# Patient Record
Sex: MALE | Race: WHITE | NOT HISPANIC OR LATINO
[De-identification: names, ages, dates, MRNs, and addresses within clinical notes are randomized per-mention and may not be internally consistent; named-entity substitution may affect disease eponyms.]

---

## 2017-01-26 ENCOUNTER — APPOINTMENT (OUTPATIENT)
Dept: INTERNAL MEDICINE | Facility: CLINIC | Age: 82
End: 2017-01-26

## 2017-01-30 LAB
BASOPHILS # BLD AUTO: 0.02 K/UL
BASOPHILS NFR BLD AUTO: 0.4 %
EOSINOPHIL # BLD AUTO: 0.16 K/UL
EOSINOPHIL NFR BLD AUTO: 3 %
HCT VFR BLD CALC: 42.4 %
HGB BLD-MCNC: 13.9 G/DL
IMM GRANULOCYTES NFR BLD AUTO: 0.4 %
LYMPHOCYTES # BLD AUTO: 1.65 K/UL
LYMPHOCYTES NFR BLD AUTO: 31 %
MAN DIFF?: NORMAL
MCHC RBC-ENTMCNC: 30 PG
MCHC RBC-ENTMCNC: 32.8 GM/DL
MCV RBC AUTO: 91.6 FL
MONOCYTES # BLD AUTO: 0.39 K/UL
MONOCYTES NFR BLD AUTO: 7.3 %
NEUTROPHILS # BLD AUTO: 3.09 K/UL
NEUTROPHILS NFR BLD AUTO: 57.9 %
PLATELET # BLD AUTO: 102 K/UL
PSA SERPL-MCNC: 4.65 NG/ML
RBC # BLD: 4.63 M/UL
RBC # FLD: 13.6 %
WBC # FLD AUTO: 5.33 K/UL

## 2017-01-31 ENCOUNTER — APPOINTMENT (OUTPATIENT)
Dept: INTERNAL MEDICINE | Facility: CLINIC | Age: 82
End: 2017-01-31

## 2017-04-10 ENCOUNTER — RX RENEWAL (OUTPATIENT)
Age: 82
End: 2017-04-10

## 2017-05-30 ENCOUNTER — APPOINTMENT (OUTPATIENT)
Dept: INTERNAL MEDICINE | Facility: CLINIC | Age: 82
End: 2017-05-30

## 2017-05-31 ENCOUNTER — APPOINTMENT (OUTPATIENT)
Dept: INTERNAL MEDICINE | Facility: CLINIC | Age: 82
End: 2017-05-31

## 2017-05-31 LAB
ALBUMIN SERPL ELPH-MCNC: 4.1 G/DL
ALP BLD-CCNC: 71 U/L
ALT SERPL-CCNC: 15 U/L
ANION GAP SERPL CALC-SCNC: 15 MMOL/L
AST SERPL-CCNC: 16 U/L
BASOPHILS # BLD AUTO: 0.03 K/UL
BASOPHILS NFR BLD AUTO: 0.6 %
BILIRUB SERPL-MCNC: 0.5 MG/DL
BUN SERPL-MCNC: 24 MG/DL
CALCIUM SERPL-MCNC: 9.8 MG/DL
CHLORIDE SERPL-SCNC: 107 MMOL/L
CHOLEST SERPL-MCNC: 191 MG/DL
CHOLEST/HDLC SERPL: 2.9 RATIO
CO2 SERPL-SCNC: 23 MMOL/L
CREAT SERPL-MCNC: 1.16 MG/DL
EOSINOPHIL # BLD AUTO: 0.18 K/UL
EOSINOPHIL NFR BLD AUTO: 3.6 %
GLUCOSE SERPL-MCNC: 100 MG/DL
HCT VFR BLD CALC: 43 %
HDLC SERPL-MCNC: 65 MG/DL
HGB BLD-MCNC: 14.1 G/DL
IMM GRANULOCYTES NFR BLD AUTO: 0.2 %
LDLC SERPL CALC-MCNC: 109 MG/DL
LYMPHOCYTES # BLD AUTO: 1.51 K/UL
LYMPHOCYTES NFR BLD AUTO: 30.2 %
MAN DIFF?: NORMAL
MCHC RBC-ENTMCNC: 30.7 PG
MCHC RBC-ENTMCNC: 32.8 GM/DL
MCV RBC AUTO: 93.5 FL
MONOCYTES # BLD AUTO: 0.26 K/UL
MONOCYTES NFR BLD AUTO: 5.2 %
NEUTROPHILS # BLD AUTO: 3.01 K/UL
NEUTROPHILS NFR BLD AUTO: 60.2 %
PLATELET # BLD AUTO: 111 K/UL
POTASSIUM SERPL-SCNC: 4.5 MMOL/L
PROT SERPL-MCNC: 6.2 G/DL
PSA SERPL-MCNC: 4.41 NG/ML
RBC # BLD: 4.6 M/UL
RBC # FLD: 14 %
SODIUM SERPL-SCNC: 145 MMOL/L
TRIGL SERPL-MCNC: 84 MG/DL
WBC # FLD AUTO: 5 K/UL

## 2017-07-13 ENCOUNTER — RX RENEWAL (OUTPATIENT)
Age: 82
End: 2017-07-13

## 2017-07-25 ENCOUNTER — RX RENEWAL (OUTPATIENT)
Age: 82
End: 2017-07-25

## 2017-11-20 ENCOUNTER — RX RENEWAL (OUTPATIENT)
Age: 82
End: 2017-11-20

## 2018-01-12 ENCOUNTER — APPOINTMENT (OUTPATIENT)
Dept: INTERNAL MEDICINE | Facility: CLINIC | Age: 83
End: 2018-01-12
Payer: MEDICARE

## 2018-01-12 PROCEDURE — 36415 COLL VENOUS BLD VENIPUNCTURE: CPT

## 2018-01-14 LAB
ALBUMIN SERPL ELPH-MCNC: 3.9 G/DL
ALP BLD-CCNC: 66 U/L
ALT SERPL-CCNC: 14 U/L
ANION GAP SERPL CALC-SCNC: 16 MMOL/L
AST SERPL-CCNC: 18 U/L
BASOPHILS # BLD AUTO: 0.03 K/UL
BASOPHILS NFR BLD AUTO: 0.6 %
BILIRUB SERPL-MCNC: 0.4 MG/DL
BUN SERPL-MCNC: 30 MG/DL
CALCIUM SERPL-MCNC: 9.4 MG/DL
CHLORIDE SERPL-SCNC: 105 MMOL/L
CO2 SERPL-SCNC: 24 MMOL/L
CREAT SERPL-MCNC: 1.1 MG/DL
EOSINOPHIL # BLD AUTO: 0.16 K/UL
EOSINOPHIL NFR BLD AUTO: 3.2 %
GLUCOSE SERPL-MCNC: 92 MG/DL
HCT VFR BLD CALC: 43.9 %
HGB BLD-MCNC: 14.3 G/DL
IMM GRANULOCYTES NFR BLD AUTO: 0 %
LYMPHOCYTES # BLD AUTO: 1.66 K/UL
LYMPHOCYTES NFR BLD AUTO: 32.9 %
MAN DIFF?: NORMAL
MCHC RBC-ENTMCNC: 30.8 PG
MCHC RBC-ENTMCNC: 32.6 GM/DL
MCV RBC AUTO: 94.6 FL
MONOCYTES # BLD AUTO: 0.33 K/UL
MONOCYTES NFR BLD AUTO: 6.5 %
NEUTROPHILS # BLD AUTO: 2.86 K/UL
NEUTROPHILS NFR BLD AUTO: 56.8 %
PLATELET # BLD AUTO: 123 K/UL
POTASSIUM SERPL-SCNC: 4.4 MMOL/L
PROT SERPL-MCNC: 6.6 G/DL
PSA SERPL-MCNC: 4.42 NG/ML
RBC # BLD: 4.64 M/UL
RBC # FLD: 14 %
SODIUM SERPL-SCNC: 145 MMOL/L
WBC # FLD AUTO: 5.04 K/UL

## 2018-01-24 ENCOUNTER — APPOINTMENT (OUTPATIENT)
Dept: INTERNAL MEDICINE | Facility: CLINIC | Age: 83
End: 2018-01-24
Payer: MEDICARE

## 2018-01-24 PROCEDURE — 99213 OFFICE O/P EST LOW 20 MIN: CPT

## 2018-01-29 ENCOUNTER — RX RENEWAL (OUTPATIENT)
Age: 83
End: 2018-01-29

## 2018-02-06 ENCOUNTER — NON-APPOINTMENT (OUTPATIENT)
Age: 83
End: 2018-02-06

## 2018-02-06 ENCOUNTER — APPOINTMENT (OUTPATIENT)
Dept: INTERNAL MEDICINE | Facility: CLINIC | Age: 83
End: 2018-02-06
Payer: MEDICARE

## 2018-02-06 VITALS — BODY MASS INDEX: 21.72 KG/M2 | WEIGHT: 150 LBS | HEIGHT: 69.5 IN
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
## 2018-02-06 DIAGNOSIS — R55 SYNCOPE AND COLLAPSE: ICD-10-CM
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
## 2018-02-06 DIAGNOSIS — M25.551 PAIN IN RIGHT HIP: ICD-10-CM

## 2018-02-06 PROCEDURE — 93000 ELECTROCARDIOGRAM COMPLETE: CPT

## 2018-02-06 PROCEDURE — 36415 COLL VENOUS BLD VENIPUNCTURE: CPT

## 2018-02-06 PROCEDURE — 99214 OFFICE O/P EST MOD 30 MIN: CPT | Mod: 25

## 2018-02-07 LAB
ANION GAP SERPL CALC-SCNC: 10 MMOL/L
BUN SERPL-MCNC: 31 MG/DL
CALCIUM SERPL-MCNC: 10.2 MG/DL
CHLORIDE SERPL-SCNC: 105 MMOL/L
CK SERPL-CCNC: 62 U/L
CO2 SERPL-SCNC: 29 MMOL/L
CREAT SERPL-MCNC: 1.19 MG/DL
GLUCOSE SERPL-MCNC: 121 MG/DL
POTASSIUM SERPL-SCNC: 4.9 MMOL/L
SODIUM SERPL-SCNC: 144 MMOL/L

## 2018-02-08 LAB
BASOPHILS # BLD AUTO: 0.03 K/UL
BASOPHILS NFR BLD AUTO: 0.5 %
EOSINOPHIL # BLD AUTO: 0.12 K/UL
EOSINOPHIL NFR BLD AUTO: 2.1 %
HCT VFR BLD CALC: 44.1 %
HGB BLD-MCNC: 13.9 G/DL
IMM GRANULOCYTES NFR BLD AUTO: 0.2 %
LYMPHOCYTES # BLD AUTO: 1.4 K/UL
LYMPHOCYTES NFR BLD AUTO: 24.4 %
MAN DIFF?: NORMAL
MCHC RBC-ENTMCNC: 29.8 PG
MCHC RBC-ENTMCNC: 31.5 GM/DL
MCV RBC AUTO: 94.4 FL
MONOCYTES # BLD AUTO: 0.22 K/UL
MONOCYTES NFR BLD AUTO: 3.8 %
NEUTROPHILS # BLD AUTO: 3.95 K/UL
NEUTROPHILS NFR BLD AUTO: 69 %
PLATELET # BLD AUTO: 113 K/UL
RBC # BLD: 4.67 M/UL
RBC # FLD: 13.9 %
WBC # FLD AUTO: 5.73 K/UL

## 2018-04-24 ENCOUNTER — RX RENEWAL (OUTPATIENT)
Age: 83
End: 2018-04-24

## 2018-06-29 ENCOUNTER — APPOINTMENT (OUTPATIENT)
Dept: DERMATOLOGY | Facility: CLINIC | Age: 83
End: 2018-06-29
Payer: MEDICARE

## 2018-06-29 VITALS
SYSTOLIC BLOOD PRESSURE: 126 MMHG | DIASTOLIC BLOOD PRESSURE: 78 MMHG | WEIGHT: 155 LBS | BODY MASS INDEX: 22.19 KG/M2 | HEIGHT: 70 IN

## 2018-06-29 DIAGNOSIS — L91.8 OTHER HYPERTROPHIC DISORDERS OF THE SKIN: ICD-10-CM

## 2018-06-29 DIAGNOSIS — L82.1 OTHER SEBORRHEIC KERATOSIS: ICD-10-CM

## 2018-06-29 PROCEDURE — 99203 OFFICE O/P NEW LOW 30 MIN: CPT

## 2018-07-02 ENCOUNTER — APPOINTMENT (OUTPATIENT)
Dept: INTERNAL MEDICINE | Facility: CLINIC | Age: 83
End: 2018-07-02
Payer: MEDICARE

## 2018-07-02 LAB
APPEARANCE: CLEAR
BASOPHILS # BLD AUTO: 0.02 K/UL
BASOPHILS NFR BLD AUTO: 0.5 %
BILIRUBIN URINE: NEGATIVE
BLOOD URINE: NEGATIVE
COLOR: YELLOW
EOSINOPHIL # BLD AUTO: 0.1 K/UL
EOSINOPHIL NFR BLD AUTO: 2.5 %
GLUCOSE QUALITATIVE U: NEGATIVE MG/DL
HCT VFR BLD CALC: 43.3 %
HGB BLD-MCNC: 14.3 G/DL
IMM GRANULOCYTES NFR BLD AUTO: 0.2 %
KETONES URINE: NEGATIVE
LEUKOCYTE ESTERASE URINE: NEGATIVE
LYMPHOCYTES # BLD AUTO: 1.47 K/UL
LYMPHOCYTES NFR BLD AUTO: 36.5 %
MAN DIFF?: NORMAL
MCHC RBC-ENTMCNC: 30.6 PG
MCHC RBC-ENTMCNC: 33 GM/DL
MCV RBC AUTO: 92.5 FL
MONOCYTES # BLD AUTO: 0.22 K/UL
MONOCYTES NFR BLD AUTO: 5.5 %
NEUTROPHILS # BLD AUTO: 2.21 K/UL
NEUTROPHILS NFR BLD AUTO: 54.8 %
NITRITE URINE: NEGATIVE
PH URINE: 5
PLATELET # BLD AUTO: 108 K/UL
PROTEIN URINE: NEGATIVE MG/DL
RBC # BLD: 4.68 M/UL
RBC # FLD: 13.7 %
SPECIFIC GRAVITY URINE: 1.02
UROBILINOGEN URINE: NEGATIVE MG/DL
WBC # FLD AUTO: 4.03 K/UL

## 2018-07-02 PROCEDURE — 36415 COLL VENOUS BLD VENIPUNCTURE: CPT

## 2018-07-03 LAB
ALBUMIN SERPL ELPH-MCNC: 4.2 G/DL
ALP BLD-CCNC: 74 U/L
ALT SERPL-CCNC: 14 U/L
ANION GAP SERPL CALC-SCNC: 12 MMOL/L
AST SERPL-CCNC: 15 U/L
BILIRUB SERPL-MCNC: 0.7 MG/DL
BUN SERPL-MCNC: 22 MG/DL
CALCIUM SERPL-MCNC: 9.9 MG/DL
CHLORIDE SERPL-SCNC: 107 MMOL/L
CHOLEST SERPL-MCNC: 173 MG/DL
CHOLEST/HDLC SERPL: 2.7 RATIO
CO2 SERPL-SCNC: 26 MMOL/L
CREAT SERPL-MCNC: 1.19 MG/DL
GLUCOSE SERPL-MCNC: 79 MG/DL
HDLC SERPL-MCNC: 64 MG/DL
LDLC SERPL CALC-MCNC: 95 MG/DL
POTASSIUM SERPL-SCNC: 4.7 MMOL/L
PROT SERPL-MCNC: 6.9 G/DL
PSA SERPL-MCNC: 4.05 NG/ML
SODIUM SERPL-SCNC: 145 MMOL/L
TRIGL SERPL-MCNC: 71 MG/DL

## 2018-07-09 ENCOUNTER — APPOINTMENT (OUTPATIENT)
Dept: INTERNAL MEDICINE | Facility: CLINIC | Age: 83
End: 2018-07-09

## 2018-07-20 ENCOUNTER — RX RENEWAL (OUTPATIENT)
Age: 83
End: 2018-07-20

## 2018-09-14 ENCOUNTER — APPOINTMENT (OUTPATIENT)
Dept: INTERNAL MEDICINE | Facility: CLINIC | Age: 83
End: 2018-09-14
Payer: MEDICARE

## 2018-09-14 PROCEDURE — 36415 COLL VENOUS BLD VENIPUNCTURE: CPT

## 2018-09-18 LAB
BASOPHILS # BLD AUTO: 0.02 K/UL
BASOPHILS NFR BLD AUTO: 0.4 %
EOSINOPHIL # BLD AUTO: 0.19 K/UL
EOSINOPHIL NFR BLD AUTO: 4 %
HCT VFR BLD CALC: 42.5 %
HGB BLD-MCNC: 14 G/DL
IMM GRANULOCYTES NFR BLD AUTO: 0.2 %
LYMPHOCYTES # BLD AUTO: 1.62 K/UL
LYMPHOCYTES NFR BLD AUTO: 34.1 %
MAN DIFF?: NORMAL
MCHC RBC-ENTMCNC: 31 PG
MCHC RBC-ENTMCNC: 32.9 GM/DL
MCV RBC AUTO: 94 FL
MONOCYTES # BLD AUTO: 0.31 K/UL
MONOCYTES NFR BLD AUTO: 6.5 %
NEUTROPHILS # BLD AUTO: 2.6 K/UL
NEUTROPHILS NFR BLD AUTO: 54.8 %
PLATELET # BLD AUTO: 102 K/UL
PSA SERPL-MCNC: 4.02 NG/ML
RBC # BLD: 4.52 M/UL
RBC # FLD: 13.5 %
WBC # FLD AUTO: 4.75 K/UL

## 2018-09-20 ENCOUNTER — APPOINTMENT (OUTPATIENT)
Dept: INTERNAL MEDICINE | Facility: CLINIC | Age: 83
End: 2018-09-20
Payer: MEDICARE

## 2018-09-20 VITALS — HEIGHT: 70 IN | WEIGHT: 155 LBS | BODY MASS INDEX: 22.19 KG/M2

## 2018-09-20 PROCEDURE — 99213 OFFICE O/P EST LOW 20 MIN: CPT

## 2018-09-20 NOTE — HISTORY OF PRESENT ILLNESS
[FreeTextEntry1] : 87 yo male presents for f/u of thrombocytopenia and hypertension. [de-identified] : 87 yo male with known hypertension, thrombocytopenia, and under lots of personal stress presents for evaluation and mgmt.\par He denies symptoms.  Actually reversed his BP medications because he was experimenting with impotence.  No known changes.\par He denies bleeding or spontaneous bruising.

## 2018-10-19 ENCOUNTER — RX RENEWAL (OUTPATIENT)
Age: 83
End: 2018-10-19

## 2018-10-31 ENCOUNTER — APPOINTMENT (OUTPATIENT)
Dept: INTERNAL MEDICINE | Facility: CLINIC | Age: 83
End: 2018-10-31
Payer: MEDICARE

## 2018-10-31 PROCEDURE — 90686 IIV4 VACC NO PRSV 0.5 ML IM: CPT

## 2018-10-31 PROCEDURE — G0008: CPT

## 2019-01-27 ENCOUNTER — RX RENEWAL (OUTPATIENT)
Age: 84
End: 2019-01-27

## 2019-03-01 ENCOUNTER — RX RENEWAL (OUTPATIENT)
Age: 84
End: 2019-03-01

## 2019-05-23 ENCOUNTER — APPOINTMENT (OUTPATIENT)
Dept: INTERNAL MEDICINE | Facility: CLINIC | Age: 84
End: 2019-05-23
Payer: MEDICARE

## 2019-05-23 VITALS
WEIGHT: 160 LBS | BODY MASS INDEX: 22.9 KG/M2 | SYSTOLIC BLOOD PRESSURE: 158 MMHG | HEART RATE: 60 BPM | DIASTOLIC BLOOD PRESSURE: 76 MMHG | HEIGHT: 70 IN | RESPIRATION RATE: 15 BRPM

## 2019-05-23 DIAGNOSIS — K59.00 CONSTIPATION, UNSPECIFIED: ICD-10-CM

## 2019-05-23 PROCEDURE — 99214 OFFICE O/P EST MOD 30 MIN: CPT

## 2019-05-23 NOTE — PHYSICAL EXAM
[Well Nourished] : well nourished [Well Developed] : well developed [Normal Sclera/Conjunctiva] : normal sclera/conjunctiva [No JVD] : no jugular venous distention [Clear to Auscultation] : lungs were clear to auscultation bilaterally [Normal Rate] : normal rate  [Regular Rhythm] : with a regular rhythm [No Edema] : there was no peripheral edema [Soft] : abdomen soft [Non Tender] : non-tender [No CVA Tenderness] : no CVA  tenderness [No Spinal Tenderness] : no spinal tenderness [No Rash] : no rash [de-identified] : Overtly sad individual [de-identified] : Decreased BS

## 2019-05-23 NOTE — HISTORY OF PRESENT ILLNESS
[FreeTextEntry1] : 88 yo patient that drops in for an interval visit and is concerned about thin incomplete BMs [de-identified] : 88 yo male under lots of emotional stress-presents with change in stool habits.  He has noted that they are thin and incomplete.  Although not constipated-he is ruminating about the cause and his loss of the ability to care for Delfina\par He denies abdominal pain, bloating, or weight loss.  He does have hemorrhoids which result in BRBPR butbleeding has not changed.

## 2019-06-10 ENCOUNTER — MEDICATION RENEWAL (OUTPATIENT)
Age: 84
End: 2019-06-10

## 2019-06-27 ENCOUNTER — NON-APPOINTMENT (OUTPATIENT)
Age: 84
End: 2019-06-27

## 2019-06-27 ENCOUNTER — APPOINTMENT (OUTPATIENT)
Dept: INTERNAL MEDICINE | Facility: CLINIC | Age: 84
End: 2019-06-27
Payer: MEDICARE

## 2019-06-27 ENCOUNTER — RX RENEWAL (OUTPATIENT)
Age: 84
End: 2019-06-27

## 2019-06-27 VITALS
WEIGHT: 155 LBS | RESPIRATION RATE: 16 BRPM | DIASTOLIC BLOOD PRESSURE: 80 MMHG | BODY MASS INDEX: 22.19 KG/M2 | SYSTOLIC BLOOD PRESSURE: 180 MMHG | HEIGHT: 70 IN | HEART RATE: 54 BPM

## 2019-06-27 DIAGNOSIS — B35.1 TINEA UNGUIUM: ICD-10-CM

## 2019-06-27 PROCEDURE — G0439: CPT

## 2019-06-27 PROCEDURE — 93000 ELECTROCARDIOGRAM COMPLETE: CPT | Mod: 59

## 2019-06-27 PROCEDURE — 99214 OFFICE O/P EST MOD 30 MIN: CPT | Mod: 25

## 2019-06-27 NOTE — REVIEW OF SYSTEMS
[Dizziness] : dizziness [Fatigue] : fatigue [Unsteady Walk] : ataxia [Depression] : depression [Negative] : Heme/Lymph

## 2019-06-27 NOTE — HEALTH RISK ASSESSMENT
[Good] : ~his/her~ current health as good [Fair] :  ~his/her~ mood as fair [Yes] : Yes [Monthly or less (1 pt)] : Monthly or less (1 point) [1 or 2 (0 pts)] : 1 or 2 (0 points) [No falls in past year] : Patient reported no falls in the past year [1] : 2) Feeling down, depressed, or hopeless for several days (1) [Hepatitis C test declined] : Hepatitis C test declined [None] : None [With Significant Other] : lives with significant other [# of Members in Household ___] :  household currently consist of [unfilled] member(s) [Retired] : retired [] :  [College] : College [Feels Safe at Home] : Feels safe at home [Fully functional (bathing, dressing, toileting, transferring, walking, feeding)] : Fully functional (bathing, dressing, toileting, transferring, walking, feeding) [Fully functional (using the telephone, shopping, preparing meals, housekeeping, doing laundry, using] : Fully functional and needs no help or supervision to perform IADLs (using the telephone, shopping, preparing meals, housekeeping, doing laundry, using transportation, managing medications and managing finances) [Reports changes in dental health] : Reports changes in dental health [Smoke Detector] : smoke detector [Carbon Monoxide Detector] : carbon monoxide detector [Safety elements used in home] : safety elements used in home [Seat Belt] :  uses seat belt [Sunscreen] : uses sunscreen [Designated Healthcare Proxy] : Designated healthcare proxy [Name: ___] : Health Care Proxy's Name: [unfilled]  [Relationship: ___] : Relationship: [unfilled] [FreeTextEntry1] : Signs of aging  [] : No [de-identified] : NO  [de-identified] : NO Salt added, Walking [[id number]] : 2 [Change in mental status noted] : No change in mental status noted [Behavior] : denies difficulty with behavior [Language] : denies difficulty with language [Learning/Retaining New Information] : denies difficulty learning/retaining new information [Handling Complex Tasks] : denies difficulty handling complex tasks [Reasoning] : denies difficulty with reasoning [Spatial Ability and Orientation] : denies difficulty with spatial ability and orientation [Sexually Active] : not sexually active [High Risk Behavior] : no high risk behavior [Reports changes in hearing] : Reports no changes in hearing [Reports changes in vision] : Reports no changes in vision [ColonoscopyComments] : Virtual colonoscopy [de-identified] : Reading a newspaper is difficult;  hearing difficulty [de-identified] :  [de-identified] : Misses work [AdvancecareDate] : 6/27/2019 [FreeTextEntry4] : [id number]  Ronda

## 2019-06-27 NOTE — HISTORY OF PRESENT ILLNESS
[FreeTextEntry1] : 88 yo male presents for a comprehensive  and preventive exam.  He complains a fungus on his right thumb nail and being a little more our of balance at times. 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  He has gotten more help with his wife and more financial support.  It is now a workable situation.  She has not improved and is suffering with chronic UTIs.\par He notes that he has changed his medication to a schedule where he takes both BP meds at night and his Beta Blocker in the morning.\par He also has noted some constipation but feels that it has improved since adding more water and fiber to his regimen.

## 2019-06-27 NOTE — PHYSICAL EXAM
[No Acute Distress] : no acute distress [Well Nourished] : well nourished [Well Developed] : well developed [Normal Sclera/Conjunctiva] : normal sclera/conjunctiva [PERRL] : pupils equal round and reactive to light [Well-Appearing] : well-appearing [EOMI] : extraocular movements intact [Normal Outer Ear/Nose] : the outer ears and nose were normal in appearance [Normal Oropharynx] : the oropharynx was normal [No JVD] : no jugular venous distention [No Lymphadenopathy] : no lymphadenopathy [Supple] : supple [Thyroid Normal, No Nodules] : the thyroid was normal and there were no nodules present [Clear to Auscultation] : lungs were clear to auscultation bilaterally [No Respiratory Distress] : no respiratory distress  [Normal Rate] : normal rate  [No Accessory Muscle Use] : no accessory muscle use [Normal S1, S2] : normal S1 and S2 [Regular Rhythm] : with a regular rhythm [No Carotid Bruits] : no carotid bruits [No Abdominal Bruit] : a ~M bruit was not heard ~T in the abdomen [No Edema] : there was no peripheral edema [Pedal Pulses Present] : the pedal pulses are present [No Extremity Clubbing/Cyanosis] : no extremity clubbing/cyanosis [No Palpable Aorta] : no palpable aorta [Soft] : abdomen soft [Non-distended] : non-distended [Non Tender] : non-tender [Normal Bowel Sounds] : normal bowel sounds [No HSM] : no HSM [No Masses] : no abdominal mass palpated [Normal Posterior Cervical Nodes] : no posterior cervical lymphadenopathy [No Spinal Tenderness] : no spinal tenderness [No CVA Tenderness] : no CVA  tenderness [Normal Anterior Cervical Nodes] : no anterior cervical lymphadenopathy [Grossly Normal Strength/Tone] : grossly normal strength/tone [No Joint Swelling] : no joint swelling [No Rash] : no rash [Normal Gait] : normal gait [No Focal Deficits] : no focal deficits [Coordination Grossly Intact] : coordination grossly intact [Deep Tendon Reflexes (DTR)] : deep tendon reflexes were 2+ and symmetric [Normal Affect] : the affect was normal [de-identified] : Cerumenous canals bilaterally [Normal Insight/Judgement] : insight and judgment were intact [de-identified] : Slow moving individual/ but no significant change from the past [de-identified] : 1/6 murmur as previously at LLSB to apex c.w MR [de-identified] : Vennous stasis  and some varicosities [de-identified] : Bilateral incomplete hernias inguinal [de-identified] : Thumb or Right hand has a paronychial fungal infection [de-identified] : Slow [de-identified] : Multiple Seborrheic Keratosis [de-identified] : Bilateral scrotal cyst c/w hydrocele, Prostat 3+ enlarged without nodules

## 2019-06-28 ENCOUNTER — MED ADMIN CHARGE (OUTPATIENT)
Age: 84
End: 2019-06-28

## 2019-06-28 ENCOUNTER — APPOINTMENT (OUTPATIENT)
Dept: INTERNAL MEDICINE | Facility: CLINIC | Age: 84
End: 2019-06-28
Payer: MEDICARE

## 2019-06-28 DIAGNOSIS — Z23 ENCOUNTER FOR IMMUNIZATION: ICD-10-CM

## 2019-06-28 PROCEDURE — G0009: CPT

## 2019-06-28 PROCEDURE — 90670 PCV13 VACCINE IM: CPT

## 2019-07-01 LAB
25(OH)D3 SERPL-MCNC: 23.3 NG/ML
ALBUMIN SERPL ELPH-MCNC: 4.4 G/DL
ALP BLD-CCNC: 81 U/L
ALT SERPL-CCNC: 16 U/L
ANION GAP SERPL CALC-SCNC: 10 MMOL/L
APPEARANCE: CLEAR
AST SERPL-CCNC: 14 U/L
BACTERIA: NEGATIVE
BASOPHILS # BLD AUTO: 0.04 K/UL
BASOPHILS NFR BLD AUTO: 0.8 %
BILIRUB SERPL-MCNC: 0.5 MG/DL
BILIRUBIN URINE: NEGATIVE
BLOOD URINE: NEGATIVE
BUN SERPL-MCNC: 23 MG/DL
CALCIUM SERPL-MCNC: 9.7 MG/DL
CHLORIDE SERPL-SCNC: 107 MMOL/L
CHOLEST SERPL-MCNC: 181 MG/DL
CHOLEST/HDLC SERPL: 2.6 RATIO
CO2 SERPL-SCNC: 26 MMOL/L
COLOR: NORMAL
CREAT SERPL-MCNC: 1.16 MG/DL
EOSINOPHIL # BLD AUTO: 0.14 K/UL
EOSINOPHIL NFR BLD AUTO: 2.8 %
GLUCOSE QUALITATIVE U: NEGATIVE
GLUCOSE SERPL-MCNC: 100 MG/DL
HCT VFR BLD CALC: 42.4 %
HDLC SERPL-MCNC: 71 MG/DL
HGB BLD-MCNC: 14.1 G/DL
HYALINE CASTS: 1 /LPF
IMM GRANULOCYTES NFR BLD AUTO: 0.4 %
KETONES URINE: NEGATIVE
LDLC SERPL CALC-MCNC: 99 MG/DL
LEUKOCYTE ESTERASE URINE: NEGATIVE
LYMPHOCYTES # BLD AUTO: 1.44 K/UL
LYMPHOCYTES NFR BLD AUTO: 28.8 %
MAN DIFF?: NORMAL
MCHC RBC-ENTMCNC: 30.7 PG
MCHC RBC-ENTMCNC: 33.3 GM/DL
MCV RBC AUTO: 92.4 FL
MICROSCOPIC-UA: NORMAL
MONOCYTES # BLD AUTO: 0.36 K/UL
MONOCYTES NFR BLD AUTO: 7.2 %
NEUTROPHILS # BLD AUTO: 3 K/UL
NEUTROPHILS NFR BLD AUTO: 60 %
NITRITE URINE: NEGATIVE
PH URINE: 5.5
PLATELET # BLD AUTO: 105 K/UL
POTASSIUM SERPL-SCNC: 4.6 MMOL/L
PROT SERPL-MCNC: 6.4 G/DL
PROTEIN URINE: NORMAL
PSA SERPL-MCNC: 9.48 NG/ML
RBC # BLD: 4.59 M/UL
RBC # FLD: 13.2 %
RED BLOOD CELLS URINE: 0 /HPF
SODIUM SERPL-SCNC: 143 MMOL/L
SPECIFIC GRAVITY URINE: 1.02
SQUAMOUS EPITHELIAL CELLS: 1 /HPF
T4 SERPL-MCNC: 6.7 UG/DL
TRIGL SERPL-MCNC: 54 MG/DL
TSH SERPL-ACNC: 2.8 UIU/ML
UROBILINOGEN URINE: NORMAL
WBC # FLD AUTO: 5 K/UL
WHITE BLOOD CELLS URINE: 1 /HPF

## 2019-09-18 ENCOUNTER — RX RENEWAL (OUTPATIENT)
Age: 84
End: 2019-09-18

## 2019-11-22 ENCOUNTER — MEDICATION RENEWAL (OUTPATIENT)
Age: 84
End: 2019-11-22

## 2019-11-25 ENCOUNTER — RX RENEWAL (OUTPATIENT)
Age: 84
End: 2019-11-25

## 2020-01-06 ENCOUNTER — APPOINTMENT (OUTPATIENT)
Dept: INTERNAL MEDICINE | Facility: CLINIC | Age: 85
End: 2020-01-06
Payer: MEDICARE

## 2020-01-06 VITALS
DIASTOLIC BLOOD PRESSURE: 72 MMHG | HEIGHT: 70 IN | BODY MASS INDEX: 24.05 KG/M2 | WEIGHT: 168 LBS | SYSTOLIC BLOOD PRESSURE: 140 MMHG | HEART RATE: 54 BPM

## 2020-01-06 PROCEDURE — 90686 IIV4 VACC NO PRSV 0.5 ML IM: CPT

## 2020-01-06 PROCEDURE — 99214 OFFICE O/P EST MOD 30 MIN: CPT | Mod: 25

## 2020-01-06 PROCEDURE — G0008: CPT

## 2020-01-06 NOTE — PHYSICAL EXAM
[No Acute Distress] : no acute distress [Normal Oropharynx] : the oropharynx was normal [Clear to Auscultation] : lungs were clear to auscultation bilaterally [Normal Percussion] : the chest was normal to percussion [Normal Rate] : normal rate  [Regular Rhythm] : with a regular rhythm [Pedal Pulses Present] : the pedal pulses are present [Soft] : abdomen soft [Non Tender] : non-tender [Normal] : no posterior cervical lymphadenopathy and no anterior cervical lymphadenopathy [No CVA Tenderness] : no CVA  tenderness [Normal Gait] : normal gait [Deep Tendon Reflexes (DTR)] : deep tendon reflexes were 2+ and symmetric [de-identified] : 1/6 [de-identified] : Trace/edema [de-identified] : Petechia in the feet

## 2020-01-06 NOTE — HISTORY OF PRESENT ILLNESS
[FreeTextEntry1] : 86 yo male presents for an interval exam after being -his wife was severely disabled prior to passing. [de-identified] : 88 yo male presents and notes that he tires and develops cramps in his legs when walking up hills.  He denies chest pressure.\par Life is OK after losing his wife.  He has several woman who he sees.  He has gained about 10-15 lbs.\par He has not had the echocardiogram

## 2020-01-27 ENCOUNTER — RX RENEWAL (OUTPATIENT)
Age: 85
End: 2020-01-27

## 2020-02-12 ENCOUNTER — APPOINTMENT (OUTPATIENT)
Dept: CV DIAGNOSITCS | Facility: HOSPITAL | Age: 85
End: 2020-02-12

## 2020-02-12 ENCOUNTER — OUTPATIENT (OUTPATIENT)
Dept: OUTPATIENT SERVICES | Facility: HOSPITAL | Age: 85
LOS: 1 days | End: 2020-02-12
Payer: MEDICARE

## 2020-02-12 DIAGNOSIS — R01.1 CARDIAC MURMUR, UNSPECIFIED: ICD-10-CM

## 2020-02-12 PROCEDURE — 93306 TTE W/DOPPLER COMPLETE: CPT

## 2020-02-12 PROCEDURE — 93306 TTE W/DOPPLER COMPLETE: CPT | Mod: 26

## 2020-02-15 LAB
ALBUMIN SERPL ELPH-MCNC: 4.4 G/DL
ALP BLD-CCNC: 73 U/L
ALT SERPL-CCNC: 14 U/L
ANION GAP SERPL CALC-SCNC: 13 MMOL/L
APPEARANCE: CLEAR
AST SERPL-CCNC: 16 U/L
BASOPHILS # BLD AUTO: 0.03 K/UL
BASOPHILS NFR BLD AUTO: 0.6 %
BILIRUB SERPL-MCNC: 0.6 MG/DL
BILIRUBIN URINE: NEGATIVE
BLOOD URINE: NEGATIVE
BUN SERPL-MCNC: 20 MG/DL
CALCIUM SERPL-MCNC: 9.7 MG/DL
CHLORIDE SERPL-SCNC: 107 MMOL/L
CHOLEST SERPL-MCNC: 188 MG/DL
CHOLEST/HDLC SERPL: 2.5 RATIO
CO2 SERPL-SCNC: 25 MMOL/L
COLOR: NORMAL
CREAT SERPL-MCNC: 1.2 MG/DL
EOSINOPHIL # BLD AUTO: 0.17 K/UL
EOSINOPHIL NFR BLD AUTO: 3.2 %
GLUCOSE QUALITATIVE U: NEGATIVE
GLUCOSE SERPL-MCNC: 95 MG/DL
HCT VFR BLD CALC: 44.6 %
HDLC SERPL-MCNC: 74 MG/DL
HGB BLD-MCNC: 14.5 G/DL
IMM GRANULOCYTES NFR BLD AUTO: 0.4 %
KETONES URINE: NEGATIVE
LDLC SERPL CALC-MCNC: 98 MG/DL
LEUKOCYTE ESTERASE URINE: NEGATIVE
LYMPHOCYTES # BLD AUTO: 1.35 K/UL
LYMPHOCYTES NFR BLD AUTO: 25.5 %
MAN DIFF?: NORMAL
MCHC RBC-ENTMCNC: 30.9 PG
MCHC RBC-ENTMCNC: 32.5 GM/DL
MCV RBC AUTO: 94.9 FL
MONOCYTES # BLD AUTO: 0.37 K/UL
MONOCYTES NFR BLD AUTO: 7 %
NEUTROPHILS # BLD AUTO: 3.35 K/UL
NEUTROPHILS NFR BLD AUTO: 63.3 %
NITRITE URINE: NEGATIVE
PH URINE: 5.5
PLATELET # BLD AUTO: 108 K/UL
POTASSIUM SERPL-SCNC: 4.5 MMOL/L
PROT SERPL-MCNC: 6.4 G/DL
PROTEIN URINE: NORMAL
PSA FREE FLD-MCNC: 42 %
PSA FREE SERPL-MCNC: 1.83 NG/ML
PSA SERPL-MCNC: 4.35 NG/ML
RBC # BLD: 4.7 M/UL
RBC # FLD: 13.4 %
SODIUM SERPL-SCNC: 145 MMOL/L
SPECIFIC GRAVITY URINE: 1.01
TRIGL SERPL-MCNC: 78 MG/DL
UROBILINOGEN URINE: NORMAL
WBC # FLD AUTO: 5.29 K/UL

## 2020-02-20 ENCOUNTER — APPOINTMENT (OUTPATIENT)
Dept: INTERNAL MEDICINE | Facility: CLINIC | Age: 85
End: 2020-02-20
Payer: MEDICARE

## 2020-02-20 VITALS
DIASTOLIC BLOOD PRESSURE: 74 MMHG | HEIGHT: 69 IN | SYSTOLIC BLOOD PRESSURE: 124 MMHG | BODY MASS INDEX: 24.88 KG/M2 | WEIGHT: 168 LBS

## 2020-02-20 PROCEDURE — 99214 OFFICE O/P EST MOD 30 MIN: CPT

## 2020-02-20 NOTE — HISTORY OF PRESENT ILLNESS
[FreeTextEntry1] : 86 yo male presents for a f/u after having an echocardiogram for evaluation of his BP control.a [de-identified] : 88 yo male with hypertension and grieving presents for a f/u after having an echocardiogram which demonstrated normal wall dimensions without hypertrophy and a calcified mitral valve with some mitral insufficiency.\par He is feeling better.

## 2020-02-20 NOTE — PHYSICAL EXAM
[No Acute Distress] : no acute distress [Well Developed] : well developed [Well-Appearing] : well-appearing [No JVD] : no jugular venous distention [Clear to Auscultation] : lungs were clear to auscultation bilaterally [No Edema] : there was no peripheral edema [de-identified] : 1/6 murmur

## 2020-05-18 ENCOUNTER — RX RENEWAL (OUTPATIENT)
Age: 85
End: 2020-05-18

## 2020-05-28 ENCOUNTER — APPOINTMENT (OUTPATIENT)
Dept: INTERNAL MEDICINE | Facility: CLINIC | Age: 85
End: 2020-05-28
Payer: MEDICARE

## 2020-05-28 VITALS — HEIGHT: 69 IN | BODY MASS INDEX: 23.4 KG/M2 | WEIGHT: 158 LBS

## 2020-05-28 DIAGNOSIS — I34.0 NONRHEUMATIC MITRAL (VALVE) INSUFFICIENCY: ICD-10-CM

## 2020-05-28 PROCEDURE — 99213 OFFICE O/P EST LOW 20 MIN: CPT

## 2020-05-28 NOTE — HISTORY OF PRESENT ILLNESS
[FreeTextEntry1] : 89 yo male presents for an interval exam. He notes that he has made a mistake with his medications and is swollen. [de-identified] : 89 yo male with hypertension and some Mitral insufficiency presents for f/u.  He is still grieving from his loss of his wife but has been getting good care from multiple women who are friends and care givers.\par He has no significant complaints.\par He realized this morning that he is taking two Amlodipine instead of Amlodipine and lisinopril.

## 2020-05-28 NOTE — PHYSICAL EXAM
[No Acute Distress] : no acute distress [Well Developed] : well developed [Well Nourished] : well nourished [Well-Appearing] : well-appearing [Normal Sclera/Conjunctiva] : normal sclera/conjunctiva [PERRL] : pupils equal round and reactive to light [EOMI] : extraocular movements intact [Normal Outer Ear/Nose] : the outer ears and nose were normal in appearance [No JVD] : no jugular venous distention [Normal Oropharynx] : the oropharynx was normal [No Lymphadenopathy] : no lymphadenopathy [Supple] : supple [Thyroid Normal, No Nodules] : the thyroid was normal and there were no nodules present [No Accessory Muscle Use] : no accessory muscle use [No Respiratory Distress] : no respiratory distress  [Normal Rate] : normal rate  [Regular Rhythm] : with a regular rhythm [Clear to Auscultation] : lungs were clear to auscultation bilaterally [Normal S1, S2] : normal S1 and S2 [No Varicosities] : no varicosities [No Abdominal Bruit] : a ~M bruit was not heard ~T in the abdomen [No Carotid Bruits] : no carotid bruits [Pedal Pulses Present] : the pedal pulses are present [No Extremity Clubbing/Cyanosis] : no extremity clubbing/cyanosis [Soft] : abdomen soft [No Palpable Aorta] : no palpable aorta [Non-distended] : non-distended [Non Tender] : non-tender [No HSM] : no HSM [Normal Bowel Sounds] : normal bowel sounds [No Masses] : no abdominal mass palpated [Normal Posterior Cervical Nodes] : no posterior cervical lymphadenopathy [No CVA Tenderness] : no CVA  tenderness [Normal Anterior Cervical Nodes] : no anterior cervical lymphadenopathy [No Spinal Tenderness] : no spinal tenderness [Grossly Normal Strength/Tone] : grossly normal strength/tone [No Joint Swelling] : no joint swelling [Coordination Grossly Intact] : coordination grossly intact [No Rash] : no rash [Normal Gait] : normal gait [No Focal Deficits] : no focal deficits [Normal Affect] : the affect was normal [Deep Tendon Reflexes (DTR)] : deep tendon reflexes were 2+ and symmetric [Normal Insight/Judgement] : insight and judgment were intact [de-identified] : He has gained some weight [de-identified] : 1+ edema bilat [de-identified] : 2/6 murmur to apex

## 2020-07-13 ENCOUNTER — RX RENEWAL (OUTPATIENT)
Age: 85
End: 2020-07-13

## 2020-09-21 ENCOUNTER — APPOINTMENT (OUTPATIENT)
Dept: INTERNAL MEDICINE | Facility: CLINIC | Age: 85
End: 2020-09-21

## 2020-09-24 ENCOUNTER — APPOINTMENT (OUTPATIENT)
Dept: INTERNAL MEDICINE | Facility: CLINIC | Age: 85
End: 2020-09-24
Payer: MEDICARE

## 2020-09-24 ENCOUNTER — MED ADMIN CHARGE (OUTPATIENT)
Age: 85
End: 2020-09-24

## 2020-09-24 DIAGNOSIS — Z23 ENCOUNTER FOR IMMUNIZATION: ICD-10-CM

## 2020-09-24 PROCEDURE — 36415 COLL VENOUS BLD VENIPUNCTURE: CPT

## 2020-09-24 PROCEDURE — 99214 OFFICE O/P EST MOD 30 MIN: CPT | Mod: 25

## 2020-09-24 PROCEDURE — 90662 IIV NO PRSV INCREASED AG IM: CPT

## 2020-09-24 PROCEDURE — G0008: CPT

## 2020-09-24 RX ORDER — AMLODIPINE BESYLATE 2.5 MG/1
2.5 TABLET ORAL
Qty: 90 | Refills: 0 | Status: DISCONTINUED | COMMUNITY
Start: 2020-05-18 | End: 2020-09-24

## 2020-09-24 NOTE — HISTORY OF PRESENT ILLNESS
[FreeTextEntry1] : 89 yo man presents for a F/U interval visit.  HE is doing well with no specific problems. [de-identified] : 87 yo male with hypertension and a recent  presents for a follow up.  He notes that he now has a   and that relationship is good.\par He notes some glands on his tongue which he discusses with his dentist.

## 2020-09-24 NOTE — PHYSICAL EXAM
[No Acute Distress] : no acute distress [Well Developed] : well developed [Normal Oropharynx] : the oropharynx was normal [No Respiratory Distress] : no respiratory distress  [Clear to Auscultation] : lungs were clear to auscultation bilaterally [Normal Rate] : normal rate  [Soft] : abdomen soft [Non Tender] : non-tender [No CVA Tenderness] : no CVA  tenderness [No Spinal Tenderness] : no spinal tenderness [de-identified] : Minimal edema

## 2020-09-30 LAB
ANION GAP SERPL CALC-SCNC: 12 MMOL/L
BASOPHILS # BLD AUTO: 0.04 K/UL
BASOPHILS NFR BLD AUTO: 0.8 %
BUN SERPL-MCNC: 26 MG/DL
CALCIUM SERPL-MCNC: 10.1 MG/DL
CHLORIDE SERPL-SCNC: 105 MMOL/L
CO2 SERPL-SCNC: 28 MMOL/L
CREAT SERPL-MCNC: 1.05 MG/DL
EOSINOPHIL # BLD AUTO: 0.15 K/UL
EOSINOPHIL NFR BLD AUTO: 3.2 %
GLUCOSE SERPL-MCNC: 103 MG/DL
HCT VFR BLD CALC: 46.3 %
HGB BLD-MCNC: 14.8 G/DL
IMM GRANULOCYTES NFR BLD AUTO: 0.2 %
LYMPHOCYTES # BLD AUTO: 1.47 K/UL
LYMPHOCYTES NFR BLD AUTO: 30.9 %
MAN DIFF?: NORMAL
MCHC RBC-ENTMCNC: 30.5 PG
MCHC RBC-ENTMCNC: 32 GM/DL
MCV RBC AUTO: 95.3 FL
MONOCYTES # BLD AUTO: 0.29 K/UL
MONOCYTES NFR BLD AUTO: 6.1 %
NEUTROPHILS # BLD AUTO: 2.79 K/UL
NEUTROPHILS NFR BLD AUTO: 58.8 %
PLATELET # BLD AUTO: 115 K/UL
POTASSIUM SERPL-SCNC: 4.6 MMOL/L
PSA SERPL-MCNC: 5.22 NG/ML
RBC # BLD: 4.86 M/UL
RBC # FLD: 13.5 %
SODIUM SERPL-SCNC: 144 MMOL/L
WBC # FLD AUTO: 4.75 K/UL

## 2020-12-07 ENCOUNTER — RX RENEWAL (OUTPATIENT)
Age: 85
End: 2020-12-07

## 2020-12-09 ENCOUNTER — APPOINTMENT (OUTPATIENT)
Dept: INTERNAL MEDICINE | Facility: CLINIC | Age: 85
End: 2020-12-09
Payer: MEDICARE

## 2020-12-09 PROCEDURE — 36415 COLL VENOUS BLD VENIPUNCTURE: CPT

## 2020-12-14 LAB
BASOPHILS # BLD AUTO: 0.04 K/UL
BASOPHILS NFR BLD AUTO: 0.8 %
EOSINOPHIL # BLD AUTO: 0.2 K/UL
EOSINOPHIL NFR BLD AUTO: 4 %
ESTIMATED AVERAGE GLUCOSE: 105 MG/DL
HBA1C MFR BLD HPLC: 5.3 %
HCT VFR BLD CALC: 43.1 %
HGB BLD-MCNC: 13.9 G/DL
IMM GRANULOCYTES NFR BLD AUTO: 0.4 %
LYMPHOCYTES # BLD AUTO: 1.59 K/UL
LYMPHOCYTES NFR BLD AUTO: 31.9 %
MAN DIFF?: NORMAL
MCHC RBC-ENTMCNC: 30.3 PG
MCHC RBC-ENTMCNC: 32.3 GM/DL
MCV RBC AUTO: 93.9 FL
MONOCYTES # BLD AUTO: 0.36 K/UL
MONOCYTES NFR BLD AUTO: 7.2 %
NEUTROPHILS # BLD AUTO: 2.77 K/UL
NEUTROPHILS NFR BLD AUTO: 55.7 %
PLATELET # BLD AUTO: 96 K/UL
PSA SERPL-MCNC: 4.93 NG/ML
RBC # BLD: 4.59 M/UL
RBC # FLD: 13.6 %
SARS-COV-2 IGG SERPL IA-ACNC: 0.09 INDEX
SARS-COV-2 IGG SERPL QL IA: NEGATIVE
WBC # FLD AUTO: 4.98 K/UL

## 2021-02-07 ENCOUNTER — TRANSCRIPTION ENCOUNTER (OUTPATIENT)
Age: 86
End: 2021-02-07

## 2021-02-16 ENCOUNTER — APPOINTMENT (OUTPATIENT)
Dept: INTERNAL MEDICINE | Facility: CLINIC | Age: 86
End: 2021-02-16
Payer: MEDICARE

## 2021-02-16 DIAGNOSIS — N34.2 OTHER URETHRITIS: ICD-10-CM

## 2021-02-16 PROCEDURE — 99213 OFFICE O/P EST LOW 20 MIN: CPT | Mod: 25

## 2021-02-16 PROCEDURE — 36415 COLL VENOUS BLD VENIPUNCTURE: CPT

## 2021-02-16 NOTE — HISTORY OF PRESENT ILLNESS
[FreeTextEntry8] : 89 yo male presents after getting better from Influenza infection diagnosed last week at [facility] and treated with Tamiflu.  He feels better and had a mild case.  However, about 4 days ago he noted blood on passing his urine that was not mixed with the urine.  He has some dyuria and tenderness at the tip of the penis.\par He is sexually active.

## 2021-02-16 NOTE — PHYSICAL EXAM
[No Acute Distress] : no acute distress [Well-Appearing] : well-appearing [Normal Sclera/Conjunctiva] : normal sclera/conjunctiva [Normal Outer Ear/Nose] : the outer ears and nose were normal in appearance [No JVD] : no jugular venous distention [No Respiratory Distress] : no respiratory distress  [Clear to Auscultation] : lungs were clear to auscultation bilaterally [Normal Rate] : normal rate  [Normal S1, S2] : normal S1 and S2 [Soft] : abdomen soft [No CVA Tenderness] : no CVA  tenderness [de-identified] : No urethral discharge or sore or overt lesion; Hydrocoels as previously

## 2021-02-17 LAB
APPEARANCE: CLEAR
BILIRUBIN URINE: NEGATIVE
BLOOD URINE: NEGATIVE
COLOR: NORMAL
GLUCOSE QUALITATIVE U: NEGATIVE
KETONES URINE: NEGATIVE
LEUKOCYTE ESTERASE URINE: NEGATIVE
NITRITE URINE: NEGATIVE
PH URINE: 6
PLATELET # PLAS AUTO: 123 K/UL
PROTEIN URINE: NEGATIVE
SPECIFIC GRAVITY URINE: 1.01
UROBILINOGEN URINE: NORMAL

## 2021-04-18 ENCOUNTER — RX RENEWAL (OUTPATIENT)
Age: 86
End: 2021-04-18

## 2021-04-26 ENCOUNTER — RX RENEWAL (OUTPATIENT)
Age: 86
End: 2021-04-26

## 2021-08-12 ENCOUNTER — APPOINTMENT (OUTPATIENT)
Dept: INTERNAL MEDICINE | Facility: CLINIC | Age: 86
End: 2021-08-12
Payer: MEDICARE

## 2021-08-12 DIAGNOSIS — L30.9 DERMATITIS, UNSPECIFIED: ICD-10-CM

## 2021-08-12 PROCEDURE — 99213 OFFICE O/P EST LOW 20 MIN: CPT

## 2021-08-12 NOTE — HISTORY OF PRESENT ILLNESS
[FreeTextEntry1] : 88 yo male presents concerned about a rash for an interval visit [de-identified] : 88 yo male with hypertension, thrombocytopenia, and  presents with a chronic rash over the right ankle.  He has been self medicating but it has not resolved.  It is slightly itchy.

## 2021-08-12 NOTE — PHYSICAL EXAM
[No Acute Distress] : no acute distress [Well Developed] : well developed [Normal Sclera/Conjunctiva] : normal sclera/conjunctiva [Clear to Auscultation] : lungs were clear to auscultation bilaterally [Normal Percussion] : the chest was normal to percussion [Normal Rate] : normal rate  [No Edema] : there was no peripheral edema [No CVA Tenderness] : no CVA  tenderness [de-identified] : 2/6 murmur [de-identified] : Dry scaly multiple patches of flaky iriitation that looks excoriated.

## 2021-09-09 ENCOUNTER — APPOINTMENT (OUTPATIENT)
Dept: INTERNAL MEDICINE | Facility: CLINIC | Age: 86
End: 2021-09-09
Payer: MEDICARE

## 2021-09-09 VITALS
WEIGHT: 157 LBS | DIASTOLIC BLOOD PRESSURE: 78 MMHG | BODY MASS INDEX: 23.79 KG/M2 | HEIGHT: 68 IN | SYSTOLIC BLOOD PRESSURE: 156 MMHG

## 2021-09-09 DIAGNOSIS — F32.9 MAJOR DEPRESSIVE DISORDER, SINGLE EPISODE, UNSPECIFIED: ICD-10-CM

## 2021-09-09 PROCEDURE — 99213 OFFICE O/P EST LOW 20 MIN: CPT

## 2021-09-09 NOTE — HISTORY OF PRESENT ILLNESS
[FreeTextEntry1] : 89 yo male presents for f/u of his BP and his rash. [de-identified] : 88 yo male with long standing hypertension presents for f/u.  His rash responded to the Lidex cream with good results and resolved in 2 days.  He is feeling well.

## 2021-09-15 ENCOUNTER — RX RENEWAL (OUTPATIENT)
Age: 86
End: 2021-09-15

## 2021-10-25 ENCOUNTER — RX RENEWAL (OUTPATIENT)
Age: 86
End: 2021-10-25

## 2021-12-16 ENCOUNTER — APPOINTMENT (OUTPATIENT)
Dept: INTERNAL MEDICINE | Facility: CLINIC | Age: 86
End: 2021-12-16
Payer: MEDICARE

## 2021-12-16 DIAGNOSIS — R80.9 PROTEINURIA, UNSPECIFIED: ICD-10-CM

## 2021-12-16 PROCEDURE — 99214 OFFICE O/P EST MOD 30 MIN: CPT

## 2021-12-16 NOTE — PHYSICAL EXAM
[No Acute Distress] : no acute distress [Well Developed] : well developed [No JVD] : no jugular venous distention [Clear to Auscultation] : lungs were clear to auscultation bilaterally [Normal Percussion] : the chest was normal to percussion [Normal Rate] : normal rate  [Regular Rhythm] : with a regular rhythm [No Edema] : there was no peripheral edema [Normal Affect] : the affect was normal [Normal Mood] : the mood was normal [de-identified] : Rash over left arm-scaly maculopapular

## 2021-12-16 NOTE — HISTORY OF PRESENT ILLNESS
[FreeTextEntry1] : 89 yo male presents for an interval visit.  He brings his log of BP readings. [de-identified] : 88 yo male who is still mourning the loss of his wife with labile BP who has monitoring his BP at home.  He presents a log that shows variability.-but many elevated readings. with others in the normal range with the lowest readings in th 120-125 range with normal diastolic.\par He denies symptoms.

## 2022-01-10 ENCOUNTER — TRANSCRIPTION ENCOUNTER (OUTPATIENT)
Age: 87
End: 2022-01-10

## 2022-01-11 ENCOUNTER — NON-APPOINTMENT (OUTPATIENT)
Age: 87
End: 2022-01-11

## 2022-04-18 ENCOUNTER — APPOINTMENT (OUTPATIENT)
Dept: NEPHROLOGY | Facility: CLINIC | Age: 87
End: 2022-04-18
Payer: MEDICARE

## 2022-04-18 VITALS
HEIGHT: 68 IN | OXYGEN SATURATION: 98 % | RESPIRATION RATE: 15 BRPM | HEART RATE: 54 BPM | BODY MASS INDEX: 25.46 KG/M2 | WEIGHT: 168 LBS | DIASTOLIC BLOOD PRESSURE: 64 MMHG | SYSTOLIC BLOOD PRESSURE: 150 MMHG

## 2022-04-18 VITALS
BODY MASS INDEX: 25.46 KG/M2 | HEIGHT: 68 IN | TEMPERATURE: 97.3 F | OXYGEN SATURATION: 98 % | WEIGHT: 168 LBS | DIASTOLIC BLOOD PRESSURE: 79 MMHG | HEART RATE: 52 BPM | SYSTOLIC BLOOD PRESSURE: 163 MMHG

## 2022-04-18 DIAGNOSIS — D69.6 THROMBOCYTOPENIA, UNSPECIFIED: ICD-10-CM

## 2022-04-18 DIAGNOSIS — R01.1 CARDIAC MURMUR, UNSPECIFIED: ICD-10-CM

## 2022-04-18 PROCEDURE — 99203 OFFICE O/P NEW LOW 30 MIN: CPT

## 2022-04-18 NOTE — ASSESSMENT
[FreeTextEntry1] : 1. Hypertension: systolic. Normal to low diastolic. May benefit from increasing the dose of Amlodipine from 5 to 10 mg. However, first will do standard home BP monitoring protocol for 3 consecutive days, AM and PM, with an empty bladder and in the proper position and after appropriate rest. The patient will do this twice at an interval of 2 weeks and then report to me for evaluation. We discuss the option of a 24 ABPM in the future, if needed. \par 2. Thrombocytopenia: will check platelets on a blue top tube. Will also check general CBC and chemistry as the last time was over one year ago. \par 3. BPH. Follow up PSA.\par 4. Heart Murmur. ECHO showed and enlarged left atrium and mitral valve calcification and aortic sclerosis. Patient has no symptoms. The ECHO was done over 2 years ago. May do a follow up ECHO if there is suspicion of aortic stenosis. \par Will call the patient in AM w/ results of today's labs and discuss future follow up after results of home BP monitoring are available.

## 2022-04-18 NOTE — PHYSICAL EXAM
[General Appearance - Alert] : alert [General Appearance - In No Acute Distress] : in no acute distress [Sclera] : the sclera and conjunctiva were normal [Hearing Threshold Finger Rub Not [place]] : hearing was normal [Jugular Venous Distention Increased] : there was no jugular-venous distention [Auscultation Breath Sounds / Voice Sounds] : lungs were clear to auscultation bilaterally [Heart Sounds] : normal S1 and S2 [Heart Sounds Gallop] : no gallops [Systolic grade ___/6] : A grade [unfilled]/6 systolic murmur was heard. [Abdomen Tenderness] : non-tender [] : no hepato-splenomegaly [FreeTextEntry1] : No pulsatile mass [No CVA Tenderness] : no ~M costovertebral angle tenderness [Abnormal Walk] : normal gait [No Focal Deficits] : no focal deficits [Oriented To Time, Place, And Person] : oriented to person, place, and time

## 2022-04-18 NOTE — CONSULT LETTER
[Dear  ___] : Dear  [unfilled], [Consult Letter:] : I had the pleasure of evaluating your patient, [unfilled]. [Please see my note below.] : Please see my note below. [Consult Closing:] : Thank you very much for allowing me to participate in the care of this patient.  If you have any questions, please do not hesitate to contact me. [Sincerely,] : Sincerely, [FreeTextEntry2] : Dr. Susie Bae [FreeTextEntry3] : Marco Cervantes MD

## 2022-04-18 NOTE — REASON FOR VISIT
[Consultation] : a consultation visit [FreeTextEntry1] : 89 yo gentleman seen today to establish ongoing care for hypertension

## 2022-04-18 NOTE — REVIEW OF SYSTEMS
[Constipation] : constipation [Incontinence] : incontinence [Negative] : Musculoskeletal [FreeTextEntry5] : Heart murmur [FreeTextEntry4] : Post nasal drip [de-identified] : Eczema [FreeTextEntry8] : Urge incontinence. Has history of BPH

## 2022-04-18 NOTE — HISTORY OF PRESENT ILLNESS
[FreeTextEntry1] : Ramona reviewed. The patient is an excellent historian.  He was followed by Dr. Rajendra Jeffries for hypertension. He has been monitoring his BP at home w/o a routine schedule.  He is concern that his systolic BP often (around 60% of the time is above 150.  His diastolic is always below 70.  He states that if the gets up quickly he feels lightheaded.  His current medication regimen was reconciled.  He has a positive family history for ruptured aortic aneurysm (father and paternal uncle). His other medical issues include eczema, BPH and sinus congestion. \par The purpose of this visit is to establish ongoing nephrology follow up. \par Of note, the patient contracted Covid-19 in January.  He tested positive by PCR. He had receive 2 doses of vaccine and one booster previously. He was offered monoclonal antibodies and Paxlovid but declined as the disease was mild. He has completely recovered.  Also the patient was told that he had thrombocytopenia. When the test was repeated on a blue top tube, his platelet count was normal.

## 2022-04-19 LAB
ALBUMIN SERPL ELPH-MCNC: 4.3 G/DL
ALP BLD-CCNC: 81 U/L
ALT SERPL-CCNC: 16 U/L
ANION GAP SERPL CALC-SCNC: 13 MMOL/L
AST SERPL-CCNC: 16 U/L
BASOPHILS # BLD AUTO: 0.03 K/UL
BASOPHILS NFR BLD AUTO: 0.7 %
BILIRUB SERPL-MCNC: 0.4 MG/DL
BUN SERPL-MCNC: 28 MG/DL
CALCIUM SERPL-MCNC: 9.9 MG/DL
CHLORIDE SERPL-SCNC: 107 MMOL/L
CHOLEST SERPL-MCNC: 196 MG/DL
CO2 SERPL-SCNC: 25 MMOL/L
CREAT SERPL-MCNC: 1.14 MG/DL
EGFR: 61 ML/MIN/1.73M2
EOSINOPHIL # BLD AUTO: 0.17 K/UL
EOSINOPHIL NFR BLD AUTO: 3.8 %
GLUCOSE SERPL-MCNC: 108 MG/DL
HCT VFR BLD CALC: 42.3 %
HDLC SERPL-MCNC: 59 MG/DL
HGB BLD-MCNC: 13.7 G/DL
IMM GRANULOCYTES NFR BLD AUTO: 0.2 %
LDLC SERPL CALC-MCNC: 112 MG/DL
LYMPHOCYTES # BLD AUTO: 1.54 K/UL
LYMPHOCYTES NFR BLD AUTO: 34.3 %
MAN DIFF?: NORMAL
MCHC RBC-ENTMCNC: 30.4 PG
MCHC RBC-ENTMCNC: 32.4 GM/DL
MCV RBC AUTO: 93.8 FL
MONOCYTES # BLD AUTO: 0.29 K/UL
MONOCYTES NFR BLD AUTO: 6.5 %
NEUTROPHILS # BLD AUTO: 2.45 K/UL
NEUTROPHILS NFR BLD AUTO: 54.5 %
NONHDLC SERPL-MCNC: 137 MG/DL
PLATELET # BLD AUTO: 111 K/UL
PLATELET # PLAS AUTO: NORMAL
POTASSIUM SERPL-SCNC: 4.3 MMOL/L
PROT SERPL-MCNC: 6.5 G/DL
PSA SERPL-MCNC: 5.6 NG/ML
RBC # BLD: 4.51 M/UL
RBC # FLD: 13.7 %
SODIUM SERPL-SCNC: 145 MMOL/L
TRIGL SERPL-MCNC: 124 MG/DL
WBC # FLD AUTO: 4.49 K/UL

## 2022-05-02 ENCOUNTER — RX RENEWAL (OUTPATIENT)
Age: 87
End: 2022-05-02

## 2022-05-12 ENCOUNTER — RX RENEWAL (OUTPATIENT)
Age: 87
End: 2022-05-12

## 2022-08-01 ENCOUNTER — NON-APPOINTMENT (OUTPATIENT)
Age: 87
End: 2022-08-01

## 2022-08-08 ENCOUNTER — RX RENEWAL (OUTPATIENT)
Age: 87
End: 2022-08-08

## 2022-09-09 ENCOUNTER — APPOINTMENT (OUTPATIENT)
Dept: NEPHROLOGY | Facility: CLINIC | Age: 87
End: 2022-09-09

## 2022-09-09 VITALS
OXYGEN SATURATION: 97 % | WEIGHT: 168 LBS | SYSTOLIC BLOOD PRESSURE: 128 MMHG | HEIGHT: 68 IN | DIASTOLIC BLOOD PRESSURE: 70 MMHG | HEART RATE: 52 BPM | RESPIRATION RATE: 15 BRPM | BODY MASS INDEX: 25.46 KG/M2

## 2022-09-09 DIAGNOSIS — H81.10 BENIGN PAROXYSMAL VERTIGO, UNSPECIFIED EAR: ICD-10-CM

## 2022-09-09 DIAGNOSIS — N41.1 CHRONIC PROSTATITIS: ICD-10-CM

## 2022-09-09 PROCEDURE — 99213 OFFICE O/P EST LOW 20 MIN: CPT

## 2022-09-09 NOTE — HISTORY OF PRESENT ILLNESS
[FreeTextEntry1] : BP at home is normal since the increased dose of Amlodipine. Occasional minimal ankle swelling.  Reports orthostatic dizziness when he gets up in the morning or when he changes position of his head. Has had similar symptoms in the past, but it is worse lately. No syncope or presyncope. The symptoms are more compatible w/ vertigo.  He will be traveling to [place] w/ family mid October. Walks every morning w/o problems.

## 2022-09-09 NOTE — REVIEW OF SYSTEMS
[Loss Of Hearing] : hearing loss [Nocturia] : nocturia [Dizziness] : dizziness [Negative] : Psychiatric [Chest Pain] : no chest pain [Palpitations] : no palpitations [SOB on Exertion] : no shortness of breath during exertion [Constipation] : no constipation

## 2022-09-09 NOTE — PHYSICAL EXAM
[General Appearance - Alert] : alert [General Appearance - In No Acute Distress] : in no acute distress [Sclera] : the sclera and conjunctiva were normal [Hearing Threshold Finger Rub Not [place]] : hearing was normal [Jugular Venous Distention Increased] : there was no jugular-venous distention [Auscultation Breath Sounds / Voice Sounds] : lungs were clear to auscultation bilaterally [Heart Sounds] : normal S1 and S2 [Heart Sounds Gallop] : no gallops [Systolic grade ___/6] : A grade [unfilled]/6 systolic murmur was heard. [Full Pulse] : the pedal pulses are present [Abdomen Tenderness] : non-tender [] : no hepato-splenomegaly [No CVA Tenderness] : no ~M costovertebral angle tenderness [Abnormal Walk] : normal gait [No Focal Deficits] : no focal deficits [Oriented To Time, Place, And Person] : oriented to person, place, and time [FreeTextEntry1] : No pulsatile mass

## 2022-09-14 LAB
ALBUMIN SERPL ELPH-MCNC: 4.3 G/DL
ALP BLD-CCNC: 72 U/L
ALT SERPL-CCNC: 13 U/L
ANION GAP SERPL CALC-SCNC: 12 MMOL/L
AST SERPL-CCNC: 12 U/L
BASOPHILS # BLD AUTO: 0.04 K/UL
BASOPHILS NFR BLD AUTO: 0.9 %
BILIRUB SERPL-MCNC: 0.5 MG/DL
BUN SERPL-MCNC: 23 MG/DL
CALCIUM SERPL-MCNC: 9.9 MG/DL
CHLORIDE SERPL-SCNC: 105 MMOL/L
CO2 SERPL-SCNC: 27 MMOL/L
CREAT SERPL-MCNC: 1.18 MG/DL
EGFR: 59 ML/MIN/1.73M2
EOSINOPHIL # BLD AUTO: 0.14 K/UL
EOSINOPHIL NFR BLD AUTO: 3.2 %
GLUCOSE SERPL-MCNC: 104 MG/DL
HCT VFR BLD CALC: 41.3 %
HGB BLD-MCNC: 13.5 G/DL
IMM GRANULOCYTES NFR BLD AUTO: 0.2 %
LYMPHOCYTES # BLD AUTO: 1.38 K/UL
LYMPHOCYTES NFR BLD AUTO: 31.7 %
MAN DIFF?: NORMAL
MCHC RBC-ENTMCNC: 30.2 PG
MCHC RBC-ENTMCNC: 32.7 GM/DL
MCV RBC AUTO: 92.4 FL
MONOCYTES # BLD AUTO: 0.31 K/UL
MONOCYTES NFR BLD AUTO: 7.1 %
NEUTROPHILS # BLD AUTO: 2.48 K/UL
NEUTROPHILS NFR BLD AUTO: 56.9 %
PLATELET # BLD AUTO: 105 K/UL
POTASSIUM SERPL-SCNC: 4.2 MMOL/L
PROT SERPL-MCNC: 6.2 G/DL
RBC # BLD: 4.47 M/UL
RBC # FLD: 13.6 %
SODIUM SERPL-SCNC: 144 MMOL/L
WBC # FLD AUTO: 4.36 K/UL

## 2023-07-22 NOTE — ASSESSMENT
[FreeTextEntry1] : 1. Hypertension well controlled.\par 2. Benign Postural Vertigo. Discussed vestibular rehabilitation if symptoms worsen.\par Discussed need for Influenza Vaccination and new Covid-19 booster before international travel. Labs to be done next week. \par Return in 6 months. 
2

## 2023-08-03 ENCOUNTER — APPOINTMENT (OUTPATIENT)
Dept: OTOLARYNGOLOGY | Facility: CLINIC | Age: 88
End: 2023-08-03
Payer: MEDICARE

## 2023-08-03 VITALS
SYSTOLIC BLOOD PRESSURE: 115 MMHG | BODY MASS INDEX: 24.25 KG/M2 | OXYGEN SATURATION: 96 % | WEIGHT: 160 LBS | TEMPERATURE: 97.8 F | HEIGHT: 68 IN | RESPIRATION RATE: 18 BRPM | HEART RATE: 50 BPM | DIASTOLIC BLOOD PRESSURE: 66 MMHG

## 2023-08-03 DIAGNOSIS — Z78.9 OTHER SPECIFIED HEALTH STATUS: ICD-10-CM

## 2023-08-03 DIAGNOSIS — H93.293 OTHER ABNORMAL AUDITORY PERCEPTIONS, BILATERAL: ICD-10-CM

## 2023-08-03 DIAGNOSIS — H90.3 SENSORINEURAL HEARING LOSS, BILATERAL: ICD-10-CM

## 2023-08-03 DIAGNOSIS — Z86.79 PERSONAL HISTORY OF OTHER DISEASES OF THE CIRCULATORY SYSTEM: ICD-10-CM

## 2023-08-03 DIAGNOSIS — R09.82 POSTNASAL DRIP: ICD-10-CM

## 2023-08-03 DIAGNOSIS — H61.23 IMPACTED CERUMEN, BILATERAL: ICD-10-CM

## 2023-08-03 PROCEDURE — 92557 COMPREHENSIVE HEARING TEST: CPT

## 2023-08-03 PROCEDURE — 99204 OFFICE O/P NEW MOD 45 MIN: CPT | Mod: 25

## 2023-08-03 PROCEDURE — G0268 REMOVAL OF IMPACTED WAX MD: CPT

## 2023-08-03 PROCEDURE — 92567 TYMPANOMETRY: CPT

## 2023-08-03 RX ORDER — SOD CHLOR,BICARB/SQUEEZ BOTTLE
PACKET, WITH RINSE DEVICE NASAL
Qty: 1 | Refills: 3 | Status: ACTIVE | COMMUNITY
Start: 2023-08-03 | End: 1900-01-01

## 2023-08-03 RX ORDER — CICLOPIROX 80 MG/ML
8 SOLUTION TOPICAL
Qty: 1 | Refills: 3 | Status: COMPLETED | COMMUNITY
Start: 2019-06-27 | End: 2023-08-03

## 2023-08-03 RX ORDER — FLUTICASONE PROPIONATE 50 UG/1
50 SPRAY, METERED NASAL DAILY
Qty: 1 | Refills: 2 | Status: ACTIVE | COMMUNITY
Start: 2023-08-03 | End: 1900-01-01

## 2023-08-03 RX ORDER — FLUOCINONIDE 0.5 MG/G
0.05 CREAM TOPICAL TWICE DAILY
Qty: 1 | Refills: 2 | Status: COMPLETED | COMMUNITY
Start: 2021-08-12 | End: 2023-08-03

## 2023-08-03 NOTE — HISTORY OF PRESENT ILLNESS
[de-identified] : 89 year old male with hx of HTN and Vertigo presents with gradual decreased hearing for 3-4 months  Reports occasional ear fullness and clogged ears  States he favors is right ear when talking on the phone but believes both ears are declining.  States he has hx of PND, worse in the morning and occasional dry nose, crust in nose and nasal congestion, right nostril is currently clogged.  Denies epistaxis, otalgia, otorrhea, ear infections, dizziness, vertigo, headaches related to hearing.  No hx of hearing aids usage, ear trauma, chemo or radiation exposure, loud noise exposure or ear surgeries  No family hx of hearing loss.  Patient denies dysphagia, odynophagia, dyspnea, dysphonia or fevers.

## 2023-08-03 NOTE — ASSESSMENT
[FreeTextEntry1] : 89 year M present with bilateral SNHL and bilateral cerumen impaction. Patient tolerated cerumen removal without complaints.   Personally reviewed Audiogram shows AD: Hearing WNL sloping to a severe SNHL .25-8khz. AS: Mild sloping to severe SNHL .25-8kHz. AU Tymp A  Physical exam shows bilateral ears normal EAC/TM.   Recommend Cerumen Impaction -Discussed not using q-tips or instruments to remove wax -Olive or mineral oil 1x per month to keep ear canal lubricated. Discussed that the ear is a self cleaning structure and just allow it clean itself. If wax builds up can try debrox. Once it gets impacted recommend return to get it cleaned out.  Bilateral SNHL / Asymmetrical SNHL -Discussed Benefit of Hearings Aids and their value of helping keep brain stimulated by helping hear conversation which keeps a person active and socially connected. Stressed also the association with a lower risk of incident dementia and slower cognitive decline. -Discussed extensively that asymmetrical hearing loss can be associate many etiologies one of which may be a small lesions within the nerve of hearing. An MRI Brain/IAC can usually  these lesion. Majority of the time even if there is a lesion we would monitor the lesion with serial MRI to follow its growth. -Patient states would NOT like to pursue the MRI Brain/IAC at this time and would rather monitor hearing and tinnitus and if symptoms change or audiogram change she would then like to do MRI -Clearance Hearing Aid Evaluation Given  Nasal Congestion -Discussed the importance of rinsing the sinus before using nasal spray so that excess mucus lining the nasal cavity can be wash away so medication can penetration the nose. -Send to pharmacy Flonase nasal spray to be used after completing daily Sinus Rinse -If normal saline sinus rinse + nasal spray is not effective can discuss medicated nasal rinses and imaging for additional evaluation -If symptoms do not improve nasal endoscopy next visit  -Return to clinic 3 months or sooner if new/worsen symptoms present

## 2023-08-03 NOTE — DATA REVIEWED
[de-identified] : An audiogram was ordered and performed including pure tones, tympanometry and speech testing for the patients complaint of hearing loss I have independently reviewed the patient's audiogram from today and my findings include  AD: Hearing WNL sloping to a severe SNHL .25-8khz w/ excellent WRS and type A tymp. AS: Mild sloping to severe SNHL .25-8kHz w/ good WRS and type A tymp.

## 2023-08-16 ENCOUNTER — APPOINTMENT (OUTPATIENT)
Dept: NEPHROLOGY | Facility: CLINIC | Age: 88
End: 2023-08-16
Payer: MEDICARE

## 2023-08-16 VITALS
BODY MASS INDEX: 24.4 KG/M2 | WEIGHT: 161 LBS | SYSTOLIC BLOOD PRESSURE: 126 MMHG | OXYGEN SATURATION: 95 % | TEMPERATURE: 97.1 F | HEART RATE: 53 BPM | HEIGHT: 68 IN | DIASTOLIC BLOOD PRESSURE: 64 MMHG

## 2023-08-16 DIAGNOSIS — N40.0 BENIGN PROSTATIC HYPERPLASIA WITHOUT LOWER URINARY TRACT SYMPMS: ICD-10-CM

## 2023-08-16 DIAGNOSIS — Z00.00 ENCOUNTER FOR GENERAL ADULT MEDICAL EXAMINATION W/OUT ABNORMAL FINDINGS: ICD-10-CM

## 2023-08-16 PROCEDURE — 99213 OFFICE O/P EST LOW 20 MIN: CPT

## 2023-08-16 NOTE — ASSESSMENT
[FreeTextEntry1] : 1. Hypertension: well controlled on current regimen.  Exam does not suggest a significant aortic aneurysm, However in view of the family history will screen with an abdominal ultrasound. 2. History of BPH w/ some urinary incontinence. Will also willard a PSA Return in one year.

## 2023-08-16 NOTE — PHYSICAL EXAM
[General Appearance - Alert] : alert [General Appearance - In No Acute Distress] : in no acute distress [Jugular Venous Distention Increased] : there was no jugular-venous distention [Auscultation Breath Sounds / Voice Sounds] : lungs were clear to auscultation bilaterally [Heart Sounds] : normal S1 and S2 [Heart Sounds Gallop] : no gallops [Systolic grade ___/6] : A grade [unfilled]/6 systolic murmur was heard. [Edema] : there was no peripheral edema [FreeTextEntry1] : Mild pulsation mid abdomen. [Abdomen Tenderness] : non-tender [No Spinal Tenderness] : no spinal tenderness [] : no rash [No Focal Deficits] : no focal deficits [Oriented To Time, Place, And Person] : oriented to person, place, and time

## 2023-08-16 NOTE — HISTORY OF PRESENT ILLNESS
[FreeTextEntry1] : Two issues.  Had a sudden worsening of hearing loss and saw ENT. Wax removed w/ improvement. Also has hearing test w/ abnormal loss of hearing at higher frequencies.  Suggested to get hearing aids. Considering...  The second issue: the patient's father  of a ruptured aoritc aneurysm at the patient's current age and an uncle also had this issue. He is moderately concerned about having the same issue.

## 2023-08-17 LAB
ALBUMIN SERPL ELPH-MCNC: 4.4 G/DL
ALP BLD-CCNC: 78 U/L
ALT SERPL-CCNC: 13 U/L
ANION GAP SERPL CALC-SCNC: 15 MMOL/L
AST SERPL-CCNC: 14 U/L
BILIRUB SERPL-MCNC: 0.6 MG/DL
BUN SERPL-MCNC: 22 MG/DL
CALCIUM SERPL-MCNC: 10.3 MG/DL
CHLORIDE SERPL-SCNC: 104 MMOL/L
CHOLEST SERPL-MCNC: 194 MG/DL
CO2 SERPL-SCNC: 25 MMOL/L
CREAT SERPL-MCNC: 1.17 MG/DL
EGFR: 59 ML/MIN/1.73M2
GLUCOSE SERPL-MCNC: 79 MG/DL
HDLC SERPL-MCNC: 63 MG/DL
LDLC SERPL CALC-MCNC: 112 MG/DL
NONHDLC SERPL-MCNC: 132 MG/DL
POTASSIUM SERPL-SCNC: 4.4 MMOL/L
PROT SERPL-MCNC: 6.9 G/DL
PSA SERPL-MCNC: 6.86 NG/ML
SODIUM SERPL-SCNC: 144 MMOL/L
TRIGL SERPL-MCNC: 110 MG/DL

## 2023-10-11 PROBLEM — R97.20 ELEVATED PSA: Status: ACTIVE | Noted: 2023-10-11

## 2023-10-17 ENCOUNTER — APPOINTMENT (OUTPATIENT)
Dept: UROLOGY | Facility: CLINIC | Age: 88
End: 2023-10-17
Payer: MEDICARE

## 2023-10-17 VITALS
SYSTOLIC BLOOD PRESSURE: 155 MMHG | RESPIRATION RATE: 16 BRPM | DIASTOLIC BLOOD PRESSURE: 69 MMHG | HEART RATE: 66 BPM | WEIGHT: 160 LBS | BODY MASS INDEX: 24.25 KG/M2 | HEIGHT: 68 IN

## 2023-10-17 DIAGNOSIS — Z78.9 OTHER SPECIFIED HEALTH STATUS: ICD-10-CM

## 2023-10-17 DIAGNOSIS — Z63.4 DISAPPEARANCE AND DEATH OF FAMILY MEMBER: ICD-10-CM

## 2023-10-17 DIAGNOSIS — R97.20 ELEVATED PROSTATE, SPECIFIC ANTIGEN [PSA]: ICD-10-CM

## 2023-10-17 DIAGNOSIS — K64.9 UNSPECIFIED HEMORRHOIDS: ICD-10-CM

## 2023-10-17 PROCEDURE — 99204 OFFICE O/P NEW MOD 45 MIN: CPT

## 2023-10-17 SDOH — SOCIAL STABILITY - SOCIAL INSECURITY: DISSAPEARANCE AND DEATH OF FAMILY MEMBER: Z63.4

## 2024-02-07 NOTE — PHYSICAL EXAM
2.22 [No Acute Distress] : no acute distress [Normal Sclera/Conjunctiva] : normal sclera/conjunctiva [Normal Oropharynx] : the oropharynx was normal [No JVD] : no jugular venous distention [Clear to Auscultation] : lungs were clear to auscultation bilaterally [Normal Percussion] : the chest was normal to percussion [Normal Rate] : normal rate  [Regular Rhythm] : with a regular rhythm [No Edema] : there was no peripheral edema [No HSM] : no HSM [No CVA Tenderness] : no CVA  tenderness [No Spinal Tenderness] : no spinal tenderness

## 2024-04-10 ENCOUNTER — RX RENEWAL (OUTPATIENT)
Age: 89
End: 2024-04-10

## 2024-04-11 ENCOUNTER — RESULT REVIEW (OUTPATIENT)
Age: 89
End: 2024-04-11

## 2024-04-11 ENCOUNTER — INPATIENT (INPATIENT)
Facility: HOSPITAL | Age: 89
LOS: 10 days | Discharge: NURSING, REHAB OR LONG-TERM CARE | DRG: 189 | End: 2024-04-22
Attending: STUDENT IN AN ORGANIZED HEALTH CARE EDUCATION/TRAINING PROGRAM | Admitting: STUDENT IN AN ORGANIZED HEALTH CARE EDUCATION/TRAINING PROGRAM
Payer: MEDICARE

## 2024-04-11 VITALS
OXYGEN SATURATION: 98 % | HEART RATE: 84 BPM | RESPIRATION RATE: 16 BRPM | DIASTOLIC BLOOD PRESSURE: 62 MMHG | SYSTOLIC BLOOD PRESSURE: 111 MMHG | HEIGHT: 70 IN | WEIGHT: 164.91 LBS

## 2024-04-11 DIAGNOSIS — J96.01 ACUTE RESPIRATORY FAILURE WITH HYPOXIA: ICD-10-CM

## 2024-04-11 LAB
A1C WITH ESTIMATED AVERAGE GLUCOSE RESULT: 5.5 % — SIGNIFICANT CHANGE UP (ref 4–5.6)
ADD ON TEST-SPECIMEN IN LAB: SIGNIFICANT CHANGE UP
ALBUMIN SERPL ELPH-MCNC: 4.1 G/DL — SIGNIFICANT CHANGE UP (ref 3.3–5)
ALBUMIN SERPL ELPH-MCNC: 4.2 G/DL — SIGNIFICANT CHANGE UP (ref 3.3–5)
ALP SERPL-CCNC: 65 U/L — SIGNIFICANT CHANGE UP (ref 40–120)
ALP SERPL-CCNC: 70 U/L — SIGNIFICANT CHANGE UP (ref 40–120)
ALT FLD-CCNC: 14 U/L — SIGNIFICANT CHANGE UP (ref 10–45)
ALT FLD-CCNC: 15 U/L — SIGNIFICANT CHANGE UP (ref 10–45)
ANION GAP SERPL CALC-SCNC: 15 MMOL/L — SIGNIFICANT CHANGE UP (ref 5–17)
ANION GAP SERPL CALC-SCNC: 17 MMOL/L — SIGNIFICANT CHANGE UP (ref 5–17)
APPEARANCE UR: CLEAR — SIGNIFICANT CHANGE UP
APTT BLD: 147.8 SEC — CRITICAL HIGH (ref 24.5–35.6)
APTT BLD: 39.4 SEC — HIGH (ref 24.5–35.6)
AST SERPL-CCNC: 16 U/L — SIGNIFICANT CHANGE UP (ref 10–40)
AST SERPL-CCNC: 22 U/L — SIGNIFICANT CHANGE UP (ref 10–40)
BACTERIA # UR AUTO: NEGATIVE /HPF — SIGNIFICANT CHANGE UP
BASE EXCESS BLDV CALC-SCNC: -6.1 MMOL/L — LOW (ref -2–3)
BASE EXCESS BLDV CALC-SCNC: 0.5 MMOL/L — SIGNIFICANT CHANGE UP (ref -2–3)
BASOPHILS # BLD AUTO: 0.01 K/UL — SIGNIFICANT CHANGE UP (ref 0–0.2)
BASOPHILS # BLD AUTO: 0.02 K/UL — SIGNIFICANT CHANGE UP (ref 0–0.2)
BASOPHILS NFR BLD AUTO: 0.2 % — SIGNIFICANT CHANGE UP (ref 0–2)
BASOPHILS NFR BLD AUTO: 0.2 % — SIGNIFICANT CHANGE UP (ref 0–2)
BILIRUB SERPL-MCNC: 0.5 MG/DL — SIGNIFICANT CHANGE UP (ref 0.2–1.2)
BILIRUB SERPL-MCNC: 0.6 MG/DL — SIGNIFICANT CHANGE UP (ref 0.2–1.2)
BILIRUB UR-MCNC: NEGATIVE — SIGNIFICANT CHANGE UP
BUN SERPL-MCNC: 29 MG/DL — HIGH (ref 7–23)
BUN SERPL-MCNC: 29 MG/DL — HIGH (ref 7–23)
C DIFF GDH STL QL: NEGATIVE — SIGNIFICANT CHANGE UP
C DIFF GDH STL QL: SIGNIFICANT CHANGE UP
CA-I SERPL-SCNC: 1.31 MMOL/L — SIGNIFICANT CHANGE UP (ref 1.15–1.33)
CA-I SERPL-SCNC: 1.31 MMOL/L — SIGNIFICANT CHANGE UP (ref 1.15–1.33)
CALCIUM SERPL-MCNC: 10.2 MG/DL — SIGNIFICANT CHANGE UP (ref 8.4–10.5)
CALCIUM SERPL-MCNC: 9.7 MG/DL — SIGNIFICANT CHANGE UP (ref 8.4–10.5)
CAST: 20 /LPF — HIGH (ref 0–4)
CHLORIDE BLDV-SCNC: 103 MMOL/L — SIGNIFICANT CHANGE UP (ref 96–108)
CHLORIDE BLDV-SCNC: 106 MMOL/L — SIGNIFICANT CHANGE UP (ref 96–108)
CHLORIDE SERPL-SCNC: 106 MMOL/L — SIGNIFICANT CHANGE UP (ref 96–108)
CHLORIDE SERPL-SCNC: 107 MMOL/L — SIGNIFICANT CHANGE UP (ref 96–108)
CK MB BLD-MCNC: 10.5 % — HIGH (ref 0–3.5)
CK MB BLD-MCNC: 7.3 % — HIGH (ref 0–3.5)
CK MB CFR SERPL CALC: 19.3 NG/ML — HIGH (ref 0–6.7)
CK MB CFR SERPL CALC: 9.3 NG/ML — HIGH (ref 0–6.7)
CK SERPL-CCNC: 128 U/L — SIGNIFICANT CHANGE UP (ref 30–200)
CK SERPL-CCNC: 184 U/L — SIGNIFICANT CHANGE UP (ref 30–200)
CK SERPL-CCNC: 291 U/L — HIGH (ref 30–200)
CO2 BLDV-SCNC: 27 MMOL/L — HIGH (ref 22–26)
CO2 BLDV-SCNC: 30 MMOL/L — HIGH (ref 22–26)
CO2 SERPL-SCNC: 19 MMOL/L — LOW (ref 22–31)
CO2 SERPL-SCNC: 24 MMOL/L — SIGNIFICANT CHANGE UP (ref 22–31)
COLOR SPEC: YELLOW — SIGNIFICANT CHANGE UP
CREAT SERPL-MCNC: 1.38 MG/DL — HIGH (ref 0.5–1.3)
CREAT SERPL-MCNC: 1.48 MG/DL — HIGH (ref 0.5–1.3)
DIFF PNL FLD: ABNORMAL
EGFR: 44 ML/MIN/1.73M2 — LOW
EGFR: 48 ML/MIN/1.73M2 — LOW
EOSINOPHIL # BLD AUTO: 0 K/UL — SIGNIFICANT CHANGE UP (ref 0–0.5)
EOSINOPHIL # BLD AUTO: 0.04 K/UL — SIGNIFICANT CHANGE UP (ref 0–0.5)
EOSINOPHIL NFR BLD AUTO: 0 % — SIGNIFICANT CHANGE UP (ref 0–6)
EOSINOPHIL NFR BLD AUTO: 0.5 % — SIGNIFICANT CHANGE UP (ref 0–6)
EPEC DNA STL QL NAA+PROBE: DETECTED
ESTIMATED AVERAGE GLUCOSE: 111 MG/DL — SIGNIFICANT CHANGE UP (ref 68–114)
FLUAV AG NPH QL: SIGNIFICANT CHANGE UP
FLUBV AG NPH QL: SIGNIFICANT CHANGE UP
GAS PNL BLDA: SIGNIFICANT CHANGE UP
GAS PNL BLDV: 140 MMOL/L — SIGNIFICANT CHANGE UP (ref 136–145)
GAS PNL BLDV: 142 MMOL/L — SIGNIFICANT CHANGE UP (ref 136–145)
GAS PNL BLDV: SIGNIFICANT CHANGE UP
GI PCR PANEL: DETECTED
GLUCOSE BLDV-MCNC: 167 MG/DL — HIGH (ref 70–99)
GLUCOSE BLDV-MCNC: 216 MG/DL — HIGH (ref 70–99)
GLUCOSE SERPL-MCNC: 170 MG/DL — HIGH (ref 70–99)
GLUCOSE SERPL-MCNC: 221 MG/DL — HIGH (ref 70–99)
GLUCOSE UR QL: NEGATIVE MG/DL — SIGNIFICANT CHANGE UP
GRAM STN FLD: SIGNIFICANT CHANGE UP
HCO3 BLDV-SCNC: 25 MMOL/L — SIGNIFICANT CHANGE UP (ref 22–29)
HCO3 BLDV-SCNC: 28 MMOL/L — SIGNIFICANT CHANGE UP (ref 22–29)
HCT VFR BLD CALC: 43 % — SIGNIFICANT CHANGE UP (ref 39–50)
HCT VFR BLD CALC: 46 % — SIGNIFICANT CHANGE UP (ref 39–50)
HCT VFR BLD CALC: 49.8 % — SIGNIFICANT CHANGE UP (ref 39–50)
HCT VFR BLDA CALC: 46 % — SIGNIFICANT CHANGE UP (ref 39–51)
HCT VFR BLDA CALC: 47 % — SIGNIFICANT CHANGE UP (ref 39–51)
HGB BLD CALC-MCNC: 15.4 G/DL — SIGNIFICANT CHANGE UP (ref 12.6–17.4)
HGB BLD CALC-MCNC: 15.7 G/DL — SIGNIFICANT CHANGE UP (ref 12.6–17.4)
HGB BLD-MCNC: 14 G/DL — SIGNIFICANT CHANGE UP (ref 13–17)
HGB BLD-MCNC: 15.3 G/DL — SIGNIFICANT CHANGE UP (ref 13–17)
HGB BLD-MCNC: 16.3 G/DL — SIGNIFICANT CHANGE UP (ref 13–17)
HYALINE CASTS # UR AUTO: PRESENT
IMM GRANULOCYTES NFR BLD AUTO: 0.3 % — SIGNIFICANT CHANGE UP (ref 0–0.9)
IMM GRANULOCYTES NFR BLD AUTO: 0.6 % — SIGNIFICANT CHANGE UP (ref 0–0.9)
KETONES UR-MCNC: ABNORMAL MG/DL
LACTATE BLDV-MCNC: 2.8 MMOL/L — HIGH (ref 0.5–2)
LACTATE BLDV-MCNC: 3.6 MMOL/L — HIGH (ref 0.5–2)
LEUKOCYTE ESTERASE UR-ACNC: NEGATIVE — SIGNIFICANT CHANGE UP
LIDOCAIN IGE QN: 17 U/L — SIGNIFICANT CHANGE UP (ref 7–60)
LYMPHOCYTES # BLD AUTO: 0.7 K/UL — LOW (ref 1–3.3)
LYMPHOCYTES # BLD AUTO: 11.3 % — LOW (ref 13–44)
LYMPHOCYTES # BLD AUTO: 2.55 K/UL — SIGNIFICANT CHANGE UP (ref 1–3.3)
LYMPHOCYTES # BLD AUTO: 30.9 % — SIGNIFICANT CHANGE UP (ref 13–44)
MCHC RBC-ENTMCNC: 30.2 PG — SIGNIFICANT CHANGE UP (ref 27–34)
MCHC RBC-ENTMCNC: 30.5 PG — SIGNIFICANT CHANGE UP (ref 27–34)
MCHC RBC-ENTMCNC: 30.6 PG — SIGNIFICANT CHANGE UP (ref 27–34)
MCHC RBC-ENTMCNC: 32.6 GM/DL — SIGNIFICANT CHANGE UP (ref 32–36)
MCHC RBC-ENTMCNC: 32.7 GM/DL — SIGNIFICANT CHANGE UP (ref 32–36)
MCHC RBC-ENTMCNC: 33.3 GM/DL — SIGNIFICANT CHANGE UP (ref 32–36)
MCV RBC AUTO: 91.6 FL — SIGNIFICANT CHANGE UP (ref 80–100)
MCV RBC AUTO: 92.7 FL — SIGNIFICANT CHANGE UP (ref 80–100)
MCV RBC AUTO: 93.4 FL — SIGNIFICANT CHANGE UP (ref 80–100)
MONOCYTES # BLD AUTO: 0.18 K/UL — SIGNIFICANT CHANGE UP (ref 0–0.9)
MONOCYTES # BLD AUTO: 0.31 K/UL — SIGNIFICANT CHANGE UP (ref 0–0.9)
MONOCYTES NFR BLD AUTO: 2.2 % — SIGNIFICANT CHANGE UP (ref 2–14)
MONOCYTES NFR BLD AUTO: 5 % — SIGNIFICANT CHANGE UP (ref 2–14)
NEUTROPHILS # BLD AUTO: 5.16 K/UL — SIGNIFICANT CHANGE UP (ref 1.8–7.4)
NEUTROPHILS # BLD AUTO: 5.4 K/UL — SIGNIFICANT CHANGE UP (ref 1.8–7.4)
NEUTROPHILS NFR BLD AUTO: 65.6 % — SIGNIFICANT CHANGE UP (ref 43–77)
NEUTROPHILS NFR BLD AUTO: 83.2 % — HIGH (ref 43–77)
NITRITE UR-MCNC: NEGATIVE — SIGNIFICANT CHANGE UP
NRBC # BLD: 0 /100 WBCS — SIGNIFICANT CHANGE UP (ref 0–0)
NT-PROBNP SERPL-SCNC: 126 PG/ML — SIGNIFICANT CHANGE UP (ref 0–300)
PCO2 BLDV: 56 MMHG — HIGH (ref 42–55)
PCO2 BLDV: 73 MMHG — HIGH (ref 42–55)
PH BLDV: 7.14 — CRITICAL LOW (ref 7.32–7.43)
PH BLDV: 7.31 — LOW (ref 7.32–7.43)
PH UR: 5.5 — SIGNIFICANT CHANGE UP (ref 5–8)
PLATELET # BLD AUTO: 112 K/UL — LOW (ref 150–400)
PLATELET # BLD AUTO: 131 K/UL — LOW (ref 150–400)
PLATELET # BLD AUTO: 143 K/UL — LOW (ref 150–400)
PO2 BLDV: 31 MMHG — SIGNIFICANT CHANGE UP (ref 25–45)
PO2 BLDV: 76 MMHG — HIGH (ref 25–45)
POTASSIUM BLDV-SCNC: 3.1 MMOL/L — LOW (ref 3.5–5.1)
POTASSIUM BLDV-SCNC: 3.3 MMOL/L — LOW (ref 3.5–5.1)
POTASSIUM SERPL-MCNC: 3.6 MMOL/L — SIGNIFICANT CHANGE UP (ref 3.5–5.3)
POTASSIUM SERPL-MCNC: 3.6 MMOL/L — SIGNIFICANT CHANGE UP (ref 3.5–5.3)
POTASSIUM SERPL-SCNC: 3.6 MMOL/L — SIGNIFICANT CHANGE UP (ref 3.5–5.3)
POTASSIUM SERPL-SCNC: 3.6 MMOL/L — SIGNIFICANT CHANGE UP (ref 3.5–5.3)
PROT SERPL-MCNC: 6.8 G/DL — SIGNIFICANT CHANGE UP (ref 6–8.3)
PROT SERPL-MCNC: 6.8 G/DL — SIGNIFICANT CHANGE UP (ref 6–8.3)
PROT UR-MCNC: 100 MG/DL
RAPID RVP RESULT: SIGNIFICANT CHANGE UP
RBC # BLD: 4.64 M/UL — SIGNIFICANT CHANGE UP (ref 4.2–5.8)
RBC # BLD: 5.02 M/UL — SIGNIFICANT CHANGE UP (ref 4.2–5.8)
RBC # BLD: 5.33 M/UL — SIGNIFICANT CHANGE UP (ref 4.2–5.8)
RBC # FLD: 13.4 % — SIGNIFICANT CHANGE UP (ref 10.3–14.5)
RBC # FLD: 13.5 % — SIGNIFICANT CHANGE UP (ref 10.3–14.5)
RBC # FLD: 13.6 % — SIGNIFICANT CHANGE UP (ref 10.3–14.5)
RBC CASTS # UR COMP ASSIST: 10 /HPF — HIGH (ref 0–4)
REVIEW: SIGNIFICANT CHANGE UP
RSV RNA NPH QL NAA+NON-PROBE: SIGNIFICANT CHANGE UP
S PNEUM AG UR QL: NEGATIVE — SIGNIFICANT CHANGE UP
SAO2 % BLDV: 53.1 % — LOW (ref 67–88)
SAO2 % BLDV: 94.7 % — HIGH (ref 67–88)
SARS-COV-2 RNA SPEC QL NAA+PROBE: SIGNIFICANT CHANGE UP
SARS-COV-2 RNA SPEC QL NAA+PROBE: SIGNIFICANT CHANGE UP
SODIUM SERPL-SCNC: 143 MMOL/L — SIGNIFICANT CHANGE UP (ref 135–145)
SODIUM SERPL-SCNC: 145 MMOL/L — SIGNIFICANT CHANGE UP (ref 135–145)
SP GR SPEC: 1.03 — SIGNIFICANT CHANGE UP (ref 1–1.03)
SPECIMEN SOURCE: SIGNIFICANT CHANGE UP
SQUAMOUS # UR AUTO: 2 /HPF — SIGNIFICANT CHANGE UP (ref 0–5)
TROPONIN T, HIGH SENSITIVITY RESULT: 169 NG/L — HIGH (ref 0–51)
TROPONIN T, HIGH SENSITIVITY RESULT: 408 NG/L — HIGH (ref 0–51)
TROPONIN T, HIGH SENSITIVITY RESULT: 725 NG/L — HIGH (ref 0–51)
UROBILINOGEN FLD QL: 0.2 MG/DL — SIGNIFICANT CHANGE UP (ref 0.2–1)
WBC # BLD: 6.2 K/UL — SIGNIFICANT CHANGE UP (ref 3.8–10.5)
WBC # BLD: 8.24 K/UL — SIGNIFICANT CHANGE UP (ref 3.8–10.5)
WBC # BLD: 8.45 K/UL — SIGNIFICANT CHANGE UP (ref 3.8–10.5)
WBC # FLD AUTO: 6.2 K/UL — SIGNIFICANT CHANGE UP (ref 3.8–10.5)
WBC # FLD AUTO: 8.24 K/UL — SIGNIFICANT CHANGE UP (ref 3.8–10.5)
WBC # FLD AUTO: 8.45 K/UL — SIGNIFICANT CHANGE UP (ref 3.8–10.5)
WBC UR QL: 2 /HPF — SIGNIFICANT CHANGE UP (ref 0–5)

## 2024-04-11 PROCEDURE — 74176 CT ABD & PELVIS W/O CONTRAST: CPT | Mod: 26

## 2024-04-11 PROCEDURE — 99291 CRITICAL CARE FIRST HOUR: CPT | Mod: FT,25

## 2024-04-11 PROCEDURE — 93010 ELECTROCARDIOGRAM REPORT: CPT

## 2024-04-11 PROCEDURE — 71250 CT THORAX DX C-: CPT | Mod: 26

## 2024-04-11 PROCEDURE — 99292 CRITICAL CARE ADDL 30 MIN: CPT | Mod: 25

## 2024-04-11 PROCEDURE — 99221 1ST HOSP IP/OBS SF/LOW 40: CPT

## 2024-04-11 PROCEDURE — 71045 X-RAY EXAM CHEST 1 VIEW: CPT | Mod: 26,76

## 2024-04-11 PROCEDURE — 70450 CT HEAD/BRAIN W/O DYE: CPT | Mod: 26,77

## 2024-04-11 PROCEDURE — 93308 TTE F-UP OR LMTD: CPT | Mod: 26

## 2024-04-11 PROCEDURE — 31500 INSERT EMERGENCY AIRWAY: CPT

## 2024-04-11 PROCEDURE — 93321 DOPPLER ECHO F-UP/LMTD STD: CPT | Mod: 26

## 2024-04-11 PROCEDURE — 99291 CRITICAL CARE FIRST HOUR: CPT

## 2024-04-11 PROCEDURE — 70450 CT HEAD/BRAIN W/O DYE: CPT | Mod: 26

## 2024-04-11 RX ORDER — ETOMIDATE 2 MG/ML
20 INJECTION INTRAVENOUS ONCE
Refills: 0 | Status: COMPLETED | OUTPATIENT
Start: 2024-04-11 | End: 2024-04-11

## 2024-04-11 RX ORDER — FENTANYL CITRATE 50 UG/ML
0.51 INJECTION INTRAVENOUS
Qty: 5000 | Refills: 0 | Status: DISCONTINUED | OUTPATIENT
Start: 2024-04-11 | End: 2024-04-12

## 2024-04-11 RX ORDER — HEPARIN SODIUM 5000 [USP'U]/ML
600 INJECTION INTRAVENOUS; SUBCUTANEOUS
Qty: 25000 | Refills: 0 | Status: DISCONTINUED | OUTPATIENT
Start: 2024-04-11 | End: 2024-04-12

## 2024-04-11 RX ORDER — PANTOPRAZOLE SODIUM 20 MG/1
40 TABLET, DELAYED RELEASE ORAL DAILY
Refills: 0 | Status: DISCONTINUED | OUTPATIENT
Start: 2024-04-11 | End: 2024-04-15

## 2024-04-11 RX ORDER — PIPERACILLIN AND TAZOBACTAM 4; .5 G/20ML; G/20ML
3.38 INJECTION, POWDER, LYOPHILIZED, FOR SOLUTION INTRAVENOUS ONCE
Refills: 0 | Status: DISCONTINUED | OUTPATIENT
Start: 2024-04-11 | End: 2024-04-11

## 2024-04-11 RX ORDER — ASPIRIN/CALCIUM CARB/MAGNESIUM 324 MG
325 TABLET ORAL ONCE
Refills: 0 | Status: COMPLETED | OUTPATIENT
Start: 2024-04-11 | End: 2024-04-11

## 2024-04-11 RX ORDER — MAGNESIUM SULFATE 500 MG/ML
2 VIAL (ML) INJECTION ONCE
Refills: 0 | Status: COMPLETED | OUTPATIENT
Start: 2024-04-11 | End: 2024-04-11

## 2024-04-11 RX ORDER — SODIUM CHLORIDE 9 MG/ML
10 INJECTION INTRAMUSCULAR; INTRAVENOUS; SUBCUTANEOUS
Refills: 0 | Status: DISCONTINUED | OUTPATIENT
Start: 2024-04-11 | End: 2024-04-11

## 2024-04-11 RX ORDER — PROPOFOL 10 MG/ML
20 INJECTION, EMULSION INTRAVENOUS
Qty: 1000 | Refills: 0 | Status: DISCONTINUED | OUTPATIENT
Start: 2024-04-11 | End: 2024-04-11

## 2024-04-11 RX ORDER — ASPIRIN/CALCIUM CARB/MAGNESIUM 324 MG
81 TABLET ORAL DAILY
Refills: 0 | Status: DISCONTINUED | OUTPATIENT
Start: 2024-04-12 | End: 2024-04-14

## 2024-04-11 RX ORDER — FENTANYL CITRATE 50 UG/ML
25 INJECTION INTRAVENOUS
Refills: 0 | Status: DISCONTINUED | OUTPATIENT
Start: 2024-04-11 | End: 2024-04-12

## 2024-04-11 RX ORDER — IPRATROPIUM/ALBUTEROL SULFATE 18-103MCG
3 AEROSOL WITH ADAPTER (GRAM) INHALATION
Refills: 0 | Status: COMPLETED | OUTPATIENT
Start: 2024-04-11 | End: 2024-04-11

## 2024-04-11 RX ORDER — PROPOFOL 10 MG/ML
20.53 INJECTION, EMULSION INTRAVENOUS
Qty: 1000 | Refills: 0 | Status: DISCONTINUED | OUTPATIENT
Start: 2024-04-11 | End: 2024-04-12

## 2024-04-11 RX ORDER — NOREPINEPHRINE BITARTRATE/D5W 8 MG/250ML
0.05 PLASTIC BAG, INJECTION (ML) INTRAVENOUS
Qty: 8 | Refills: 0 | Status: DISCONTINUED | OUTPATIENT
Start: 2024-04-11 | End: 2024-04-12

## 2024-04-11 RX ORDER — IPRATROPIUM/ALBUTEROL SULFATE 18-103MCG
3 AEROSOL WITH ADAPTER (GRAM) INHALATION EVERY 4 HOURS
Refills: 0 | Status: DISCONTINUED | OUTPATIENT
Start: 2024-04-11 | End: 2024-04-11

## 2024-04-11 RX ORDER — HEPARIN SODIUM 5000 [USP'U]/ML
INJECTION INTRAVENOUS; SUBCUTANEOUS
Qty: 25000 | Refills: 0 | Status: DISCONTINUED | OUTPATIENT
Start: 2024-04-11 | End: 2024-04-11

## 2024-04-11 RX ORDER — ONDANSETRON 8 MG/1
4 TABLET, FILM COATED ORAL ONCE
Refills: 0 | Status: COMPLETED | OUTPATIENT
Start: 2024-04-11 | End: 2024-04-11

## 2024-04-11 RX ORDER — CLOPIDOGREL BISULFATE 75 MG/1
75 TABLET, FILM COATED ORAL DAILY
Refills: 0 | Status: DISCONTINUED | OUTPATIENT
Start: 2024-04-12 | End: 2024-04-18

## 2024-04-11 RX ORDER — POLYETHYLENE GLYCOL 3350 17 G/17G
17 POWDER, FOR SOLUTION ORAL DAILY
Refills: 0 | Status: DISCONTINUED | OUTPATIENT
Start: 2024-04-11 | End: 2024-04-22

## 2024-04-11 RX ORDER — ATORVASTATIN CALCIUM 80 MG/1
80 TABLET, FILM COATED ORAL EVERY 24 HOURS
Refills: 0 | Status: DISCONTINUED | OUTPATIENT
Start: 2024-04-11 | End: 2024-04-22

## 2024-04-11 RX ORDER — EPINEPHRINE 0.3 MG/.3ML
0.3 INJECTION INTRAMUSCULAR; SUBCUTANEOUS ONCE
Refills: 0 | Status: COMPLETED | OUTPATIENT
Start: 2024-04-11 | End: 2024-04-11

## 2024-04-11 RX ORDER — SODIUM CHLORIDE 9 MG/ML
4 INJECTION INTRAMUSCULAR; INTRAVENOUS; SUBCUTANEOUS EVERY 8 HOURS
Refills: 0 | Status: DISCONTINUED | OUTPATIENT
Start: 2024-04-11 | End: 2024-04-11

## 2024-04-11 RX ORDER — IPRATROPIUM/ALBUTEROL SULFATE 18-103MCG
3 AEROSOL WITH ADAPTER (GRAM) INHALATION EVERY 6 HOURS
Refills: 0 | Status: DISCONTINUED | OUTPATIENT
Start: 2024-04-11 | End: 2024-04-22

## 2024-04-11 RX ORDER — ATORVASTATIN CALCIUM 80 MG/1
80 TABLET, FILM COATED ORAL AT BEDTIME
Refills: 0 | Status: DISCONTINUED | OUTPATIENT
Start: 2024-04-11 | End: 2024-04-11

## 2024-04-11 RX ORDER — CHLORHEXIDINE GLUCONATE 213 G/1000ML
1 SOLUTION TOPICAL
Refills: 0 | Status: DISCONTINUED | OUTPATIENT
Start: 2024-04-11 | End: 2024-04-22

## 2024-04-11 RX ORDER — SODIUM CHLORIDE 9 MG/ML
500 INJECTION, SOLUTION INTRAVENOUS ONCE
Refills: 0 | Status: COMPLETED | OUTPATIENT
Start: 2024-04-11 | End: 2024-04-11

## 2024-04-11 RX ORDER — AZITHROMYCIN 500 MG/1
TABLET, FILM COATED ORAL
Refills: 0 | Status: DISCONTINUED | OUTPATIENT
Start: 2024-04-11 | End: 2024-04-13

## 2024-04-11 RX ORDER — HEPARIN SODIUM 5000 [USP'U]/ML
4000 INJECTION INTRAVENOUS; SUBCUTANEOUS EVERY 6 HOURS
Refills: 0 | Status: DISCONTINUED | OUTPATIENT
Start: 2024-04-11 | End: 2024-04-11

## 2024-04-11 RX ORDER — AZITHROMYCIN 500 MG/1
500 TABLET, FILM COATED ORAL ONCE
Refills: 0 | Status: COMPLETED | OUTPATIENT
Start: 2024-04-11 | End: 2024-04-11

## 2024-04-11 RX ORDER — FENTANYL CITRATE 50 UG/ML
0.5 INJECTION INTRAVENOUS
Qty: 2500 | Refills: 0 | Status: DISCONTINUED | OUTPATIENT
Start: 2024-04-11 | End: 2024-04-11

## 2024-04-11 RX ORDER — CLOPIDOGREL BISULFATE 75 MG/1
600 TABLET, FILM COATED ORAL ONCE
Refills: 0 | Status: COMPLETED | OUTPATIENT
Start: 2024-04-11 | End: 2024-04-11

## 2024-04-11 RX ORDER — SODIUM CHLORIDE 9 MG/ML
2000 INJECTION INTRAMUSCULAR; INTRAVENOUS; SUBCUTANEOUS ONCE
Refills: 0 | Status: COMPLETED | OUTPATIENT
Start: 2024-04-11 | End: 2024-04-11

## 2024-04-11 RX ORDER — ASPIRIN/CALCIUM CARB/MAGNESIUM 324 MG
600 TABLET ORAL ONCE
Refills: 0 | Status: DISCONTINUED | OUTPATIENT
Start: 2024-04-11 | End: 2024-04-11

## 2024-04-11 RX ORDER — EPINEPHRINE 11.25MG/ML
0.5 SOLUTION, NON-ORAL INHALATION ONCE
Refills: 0 | Status: COMPLETED | OUTPATIENT
Start: 2024-04-11 | End: 2024-04-11

## 2024-04-11 RX ORDER — FENTANYL CITRATE 50 UG/ML
0.5 INJECTION INTRAVENOUS
Qty: 5000 | Refills: 0 | Status: DISCONTINUED | OUTPATIENT
Start: 2024-04-11 | End: 2024-04-11

## 2024-04-11 RX ORDER — HEPARIN SODIUM 5000 [USP'U]/ML
4000 INJECTION INTRAVENOUS; SUBCUTANEOUS ONCE
Refills: 0 | Status: COMPLETED | OUTPATIENT
Start: 2024-04-11 | End: 2024-04-11

## 2024-04-11 RX ORDER — CHLORHEXIDINE GLUCONATE 213 G/1000ML
15 SOLUTION TOPICAL EVERY 12 HOURS
Refills: 0 | Status: DISCONTINUED | OUTPATIENT
Start: 2024-04-11 | End: 2024-04-11

## 2024-04-11 RX ORDER — ROCURONIUM BROMIDE 10 MG/ML
100 VIAL (ML) INTRAVENOUS ONCE
Refills: 0 | Status: COMPLETED | OUTPATIENT
Start: 2024-04-11 | End: 2024-04-11

## 2024-04-11 RX ORDER — SENNA PLUS 8.6 MG/1
10 TABLET ORAL AT BEDTIME
Refills: 0 | Status: DISCONTINUED | OUTPATIENT
Start: 2024-04-11 | End: 2024-04-14

## 2024-04-11 RX ORDER — HEPARIN SODIUM 5000 [USP'U]/ML
4000 INJECTION INTRAVENOUS; SUBCUTANEOUS EVERY 6 HOURS
Refills: 0 | Status: DISCONTINUED | OUTPATIENT
Start: 2024-04-11 | End: 2024-04-12

## 2024-04-11 RX ORDER — DEXAMETHASONE 0.5 MG/5ML
10 ELIXIR ORAL ONCE
Refills: 0 | Status: COMPLETED | OUTPATIENT
Start: 2024-04-11 | End: 2024-04-11

## 2024-04-11 RX ORDER — SODIUM CHLORIDE 9 MG/ML
4 INJECTION INTRAMUSCULAR; INTRAVENOUS; SUBCUTANEOUS EVERY 6 HOURS
Refills: 0 | Status: DISCONTINUED | OUTPATIENT
Start: 2024-04-11 | End: 2024-04-12

## 2024-04-11 RX ORDER — PROPOFOL 10 MG/ML
10 INJECTION, EMULSION INTRAVENOUS
Qty: 1000 | Refills: 0 | Status: DISCONTINUED | OUTPATIENT
Start: 2024-04-11 | End: 2024-04-11

## 2024-04-11 RX ORDER — CEFTRIAXONE 500 MG/1
2000 INJECTION, POWDER, FOR SOLUTION INTRAMUSCULAR; INTRAVENOUS EVERY 24 HOURS
Refills: 0 | Status: DISCONTINUED | OUTPATIENT
Start: 2024-04-11 | End: 2024-04-13

## 2024-04-11 RX ORDER — HEPARIN SODIUM 5000 [USP'U]/ML
4000 INJECTION INTRAVENOUS; SUBCUTANEOUS ONCE
Refills: 0 | Status: DISCONTINUED | OUTPATIENT
Start: 2024-04-11 | End: 2024-04-12

## 2024-04-11 RX ORDER — AZITHROMYCIN 500 MG/1
500 TABLET, FILM COATED ORAL EVERY 24 HOURS
Refills: 0 | Status: DISCONTINUED | OUTPATIENT
Start: 2024-04-12 | End: 2024-04-13

## 2024-04-11 RX ORDER — CHLORHEXIDINE GLUCONATE 213 G/1000ML
15 SOLUTION TOPICAL EVERY 12 HOURS
Refills: 0 | Status: DISCONTINUED | OUTPATIENT
Start: 2024-04-11 | End: 2024-04-12

## 2024-04-11 RX ADMIN — SODIUM CHLORIDE 4 MILLILITER(S): 9 INJECTION INTRAMUSCULAR; INTRAVENOUS; SUBCUTANEOUS at 23:39

## 2024-04-11 RX ADMIN — Medication 3 MILLILITER(S): at 23:38

## 2024-04-11 RX ADMIN — HEPARIN SODIUM 600 UNIT(S)/HR: 5000 INJECTION INTRAVENOUS; SUBCUTANEOUS at 19:25

## 2024-04-11 RX ADMIN — Medication 3 MILLILITER(S): at 02:56

## 2024-04-11 RX ADMIN — EPINEPHRINE 0.3 MILLIGRAM(S): 0.3 INJECTION INTRAMUSCULAR; SUBCUTANEOUS at 02:21

## 2024-04-11 RX ADMIN — ETOMIDATE 20 MILLIGRAM(S): 2 INJECTION INTRAVENOUS at 03:52

## 2024-04-11 RX ADMIN — SODIUM CHLORIDE 4 MILLILITER(S): 9 INJECTION INTRAMUSCULAR; INTRAVENOUS; SUBCUTANEOUS at 13:01

## 2024-04-11 RX ADMIN — Medication 85 MILLIMOLE(S): at 05:44

## 2024-04-11 RX ADMIN — Medication 3 MILLILITER(S): at 10:24

## 2024-04-11 RX ADMIN — PROPOFOL 8.98 MICROGRAM(S)/KG/MIN: 10 INJECTION, EMULSION INTRAVENOUS at 19:05

## 2024-04-11 RX ADMIN — ONDANSETRON 4 MILLIGRAM(S): 8 TABLET, FILM COATED ORAL at 02:55

## 2024-04-11 RX ADMIN — HEPARIN SODIUM 4000 UNIT(S): 5000 INJECTION INTRAVENOUS; SUBCUTANEOUS at 06:54

## 2024-04-11 RX ADMIN — Medication 3 MILLILITER(S): at 03:13

## 2024-04-11 RX ADMIN — AZITHROMYCIN 500 MILLIGRAM(S): 500 TABLET, FILM COATED ORAL at 10:58

## 2024-04-11 RX ADMIN — HEPARIN SODIUM 900 UNIT(S)/HR: 5000 INJECTION INTRAVENOUS; SUBCUTANEOUS at 08:21

## 2024-04-11 RX ADMIN — SODIUM CHLORIDE 2000 MILLILITER(S): 9 INJECTION INTRAMUSCULAR; INTRAVENOUS; SUBCUTANEOUS at 02:20

## 2024-04-11 RX ADMIN — CHLORHEXIDINE GLUCONATE 15 MILLILITER(S): 213 SOLUTION TOPICAL at 17:53

## 2024-04-11 RX ADMIN — FENTANYL CITRATE 25 MICROGRAM(S): 50 INJECTION INTRAVENOUS at 20:30

## 2024-04-11 RX ADMIN — EPINEPHRINE 0.3 MILLIGRAM(S): 0.3 INJECTION INTRAMUSCULAR; SUBCUTANEOUS at 03:31

## 2024-04-11 RX ADMIN — FENTANYL CITRATE 1.87 MICROGRAM(S)/KG/HR: 50 INJECTION INTRAVENOUS at 06:10

## 2024-04-11 RX ADMIN — Medication 100 MILLIGRAM(S): at 03:52

## 2024-04-11 RX ADMIN — FENTANYL CITRATE 25 MICROGRAM(S): 50 INJECTION INTRAVENOUS at 19:46

## 2024-04-11 RX ADMIN — CEFTRIAXONE 100 MILLIGRAM(S): 500 INJECTION, POWDER, FOR SOLUTION INTRAMUSCULAR; INTRAVENOUS at 10:27

## 2024-04-11 RX ADMIN — PANTOPRAZOLE SODIUM 40 MILLIGRAM(S): 20 TABLET, DELAYED RELEASE ORAL at 11:01

## 2024-04-11 RX ADMIN — Medication 3 MILLILITER(S): at 02:20

## 2024-04-11 RX ADMIN — PROPOFOL 8.98 MICROGRAM(S)/KG/MIN: 10 INJECTION, EMULSION INTRAVENOUS at 06:09

## 2024-04-11 RX ADMIN — Medication 150 GRAM(S): at 03:37

## 2024-04-11 RX ADMIN — FENTANYL CITRATE 1.87 MICROGRAM(S)/KG/HR: 50 INJECTION INTRAVENOUS at 04:42

## 2024-04-11 RX ADMIN — Medication 7.01 MICROGRAM(S)/KG/MIN: at 19:25

## 2024-04-11 RX ADMIN — Medication 0.5 MILLILITER(S): at 03:31

## 2024-04-11 RX ADMIN — Medication 3 MILLILITER(S): at 13:00

## 2024-04-11 RX ADMIN — Medication 3 MILLILITER(S): at 17:05

## 2024-04-11 RX ADMIN — Medication 7.01 MICROGRAM(S)/KG/MIN: at 07:28

## 2024-04-11 RX ADMIN — ATORVASTATIN CALCIUM 80 MILLIGRAM(S): 80 TABLET, FILM COATED ORAL at 10:58

## 2024-04-11 RX ADMIN — PROPOFOL 8.98 MICROGRAM(S)/KG/MIN: 10 INJECTION, EMULSION INTRAVENOUS at 19:46

## 2024-04-11 RX ADMIN — Medication 325 MILLIGRAM(S): at 10:28

## 2024-04-11 RX ADMIN — Medication 102 MILLIGRAM(S): at 03:13

## 2024-04-11 RX ADMIN — CLOPIDOGREL BISULFATE 600 MILLIGRAM(S): 75 TABLET, FILM COATED ORAL at 11:18

## 2024-04-11 RX ADMIN — Medication 3 MILLILITER(S): at 22:10

## 2024-04-11 RX ADMIN — SENNA PLUS 10 MILLILITER(S): 8.6 TABLET ORAL at 21:15

## 2024-04-11 RX ADMIN — SODIUM CHLORIDE 500 MILLILITER(S): 9 INJECTION, SOLUTION INTRAVENOUS at 13:00

## 2024-04-11 NOTE — H&P ADULT - HISTORY OF PRESENT ILLNESS
89yo male with pasty medical history of HTN, benign postural vertigo, presenting with diffuse watery diarrhea x 1 day duration. Diarrheal episodes and non-bloody, non-bilious emesis started after having vegetable soup and turkey sandwich for dinner. Patient reports multiple episodes of loose watery diarrhea, unable to control, and had an episode of watery stool in ED. Patient was noted to be hypoxic with crackles and wheezing, hypoxic to 80% on room air, requiring non-rebreather 10L, patient denies any food or drug allergies, no recent medication changes, no antibiotics, fevers, or chills, no recent travels, no sick contacts, no rashes. Patient subsequently continued to be hypoxic while on NRB, and subsequently intubated while in emergency room. Patient currently admitted to MICU in the setting of acute hypoxic respiratory failure requiring intubation.

## 2024-04-11 NOTE — PROVIDER CONTACT NOTE (CHANGE IN STATUS NOTIFICATION) - ACTION/TREATMENT ORDERED:
MICU attending MD Escobedo at bedside evaluating patient. Pause all sedation to assess patient. Acknowledged pupil change and ordered STAT HCT. Heparin gtt held. Patient now off sedation awake and following commands. No hemiparesis noted on B/L UE, patient moving B/L LE with no effort against gravity. Proceeded to STAT HCT with NP Amatto. CTH results negative for bleed, unchanged from 0632.
Continue to Monitor and plan for possible MRI of the brain considering there was a prior HCT from 0632. Providers proceeded to contact patient family.

## 2024-04-11 NOTE — PROVIDER CONTACT NOTE (CHANGE IN STATUS NOTIFICATION) - BACKGROUND
Prior pupil change noted on exam (see prior provider contact note).
Pt admitted to MICU at roughly 9am received from ED intubated, sedated, and on IV pressors.

## 2024-04-11 NOTE — ED PROVIDER NOTE - PHYSICAL EXAMINATION
GENERAL: Appears fatigued, speaking in full sentences.   HEENT:  Atraumatic  CHEST/LUNG: audible crackles with poor air movement bilateral, worse on left with exp wheeze on R, + rales at left lung base. Chest rise equal bilaterally  HEART: Regular rate and rhythm  ABDOMEN: Distended, non tender. Large amount of liquid brown NB diarrhea produced during physical exam.   EXTREMITIES:  Extremities warm  PSYCH: A&Ox3  SKIN: No obvious rashes or lesions  MSK: No cervical spine TTP, able to range neck to the left and right/ No midline spinal TTP/ No swelling, redness, pain or discharge from   NEUROLOGY: strength and sensation intact in all extremities. CN 2 - 12 intact. Finger to nose test intact. No pronator drift.

## 2024-04-11 NOTE — PROVIDER CONTACT NOTE (CHANGE IN STATUS NOTIFICATION) - SITUATION
89 y/o M admitted to MICU for AHRF, concern for ACS s/p Aspirin and Plavix load now on heparin gtt for ACS protocol.
89 y/o M admitted to MICU for AHRF

## 2024-04-11 NOTE — ED PROCEDURE NOTE - PROCEDURE ADDITIONAL DETAILS
2 attempts required, initially visualized going past cords to 25cm at the lip. Advanced to 27cm on second attempt. 2 attempts required, initially visualized going past cords to 25cm at the lip. No etco2, on revisualization, ETT displaced. Suspect 2/2 long neck. Advanced to 27cm on second attempt.    on CXR, ETT at 27cm at the lip, with tube appearing to go towards R mainstem but near elvin, right below clavicle. Given tub came out at 25cm, will leave as bilateral breath sounds, symmetric, with equal chest rise.

## 2024-04-11 NOTE — ED ADULT NURSE REASSESSMENT NOTE - NS ED NURSE REASSESS COMMENT FT1
Report taken from KEREN Alvarez. Cards and ED MD at beside placing central line & A-line. Unable to start heparin gtt due to only having a stated weight and unable to move pt due to line placement at this time. Pt pending dispo to MICU vs cath.

## 2024-04-11 NOTE — ED ADULT NURSE NOTE - LATERALITY
Date & Time: 4/18/2022, 4:32 PM  Patient: Mounika Amezcua  Encounter Provider(s):    SHE Maza       To Whom It May Concern:    Jasbir Baron was seen and treated in our department on 4/18/2022. She can return to work 4/20/2022.     If you have any questions or concerns, please do not hesitate to call.        _____________________________  Physician/APC Signature bilateral

## 2024-04-11 NOTE — ED PROVIDER NOTE - CARE PLAN
1 Principal Discharge DX:	Acute hypoxic respiratory failure   Principal Discharge DX:	Acute hypoxic respiratory failure  Secondary Diagnosis:	Acute respiratory distress syndrome (ARDS)  Secondary Diagnosis:	STEMI (ST elevation myocardial infarction)

## 2024-04-11 NOTE — H&P ADULT - CRITICAL CARE ATTENDING COMMENT
Pt is a 89 yo WM with h/o HTN and benign postural vertigo presented 2 to diffuse watery diarrhea x 1 day duration. Diarrheal episodes and non-bloody, non-bilious emesis started after having vegetable soup and turkey sandwich for dinner. Pt reports multiple episodes of loose watery diarrhea, unable to control, and had an episode of watery stool in ER. Pt was noted to be hypoxic with crackles and wheezing, hypoxic to 80% on room air, requiring non-rebreather 10L, Pt subsequently continued to be hypoxic while on NRB, and subsequently intubated. Pt sent for CT (1) Scattered bilateral groundglass opacities and left lower lobe consolidation, concerning for pneumonia 2) Debris within the bilateral lower lobe bronchi and obliteration of the left lower lobe bronchus 3) No bowel obstruction. Colitis involving the ascending, transverse and proximal descending colon 4) Partially calcified nodule along the posterior gastric wall, concerning for neoplasm. Endoscopy suggested for further evaluation.5) Left adrenal 9 mm hypodense nodule. Consider nonemergent dedicated adrenal CT or MRI for further evaluation)  and then became hypotensive (felt to be 2 to Propofol); Rx'd with Epi. Noted to have ST elevation on monitor and EKG which alleged improved with improvement of BP. ICU dx: 1) AHRF requiring intubation 2 to PNA 2) Septic shock 2 to PNA + Colitis 3) R/o STEMI 4) HERBERT 2 to ATN      Resp: PEEP decreased to 8 and FiO2 decreased to 50%  ID: PanCx; start Ceftriaxone + Zithromax  CVS:  HEME:  FEN:  GI:  Neuro: Pt is a 89 yo WM with h/o HTN and benign postural vertigo presented 2 to diffuse watery diarrhea x 1 day duration. Diarrheal episodes and non-bloody, non-bilious emesis started after having vegetable soup and turkey sandwich for dinner. Pt reports multiple episodes of loose watery diarrhea, unable to control, and had an episode of watery stool in ER. Pt was noted to be hypoxic with crackles and wheezing, hypoxic to 80% on room air, requiring non-rebreather 10L, Pt subsequently continued to be hypoxic while on NRB, and subsequently intubated. Pt sent for CT (1) Scattered bilateral groundglass opacities and left lower lobe consolidation, concerning for pneumonia 2) Debris within the bilateral lower lobe bronchi and obliteration of the left lower lobe bronchus 3) No bowel obstruction. Colitis involving the ascending, transverse and proximal descending colon 4) Partially calcified nodule along the posterior gastric wall, concerning for neoplasm. Endoscopy suggested for further evaluation.5) Left adrenal 9 mm hypodense nodule. Consider nonemergent dedicated adrenal CT or MRI for further evaluation)  and then became hypotensive (felt to be 2 to Propofol); Rx'd with Epi. Noted to have ST elevation on monitor and EKG which alleged improved with improvement of BP. ICU dx: 1) AHRF requiring intubation 2 to PNA 2) Septic shock 2 to PNA + Colitis 3) R/o STEMI 4) HERBERT 2 to ATN      Resp: PEEP decreased to 8 and FiO2 decreased to 50%  ID: PanCx; start Ceftriaxone + Zithromax  CVS: Levophed to maintain MAP >65/ Initial Cardio evaluation noted/ ? STEMI Trop has increased and total CK not sign elevated but MB% elevated/ Cont Heparin + Asa + Plavix and Statin/ Cardio f/u  Heme: Adjust Heparin drip to PTT  FEN: May start enteral feeds  Endo: Follow Glu  Neuro: Notified by RN pt with R dilated; pt sent for urgent CT head: No bleed (no change from am CT head)

## 2024-04-11 NOTE — ED ADULT NURSE REASSESSMENT NOTE - NS ED NURSE REASSESS COMMENT FT1
Patient intubated by Saji Bautista - 29 at the lip line, bilateral chest rise and fall, breath sounds heard bilaterally, good ETCO2 wave form, x-ray to ordered to confirm placement. changed to 27 at the lip line

## 2024-04-11 NOTE — PROVIDER CONTACT NOTE (CHANGE IN STATUS NOTIFICATION) - ASSESSMENT
R pupil 6mm fixed. Patient now off sedation awake and following commands. No hemiparesis noted on B/L UE, patient moving B/L LE with no effort against gravity.

## 2024-04-11 NOTE — ED ADULT NURSE REASSESSMENT NOTE - NS ED NURSE REASSESS COMMENT FT1
Assisted Primary RN (at bedside) with vitals during intubation process-see flowsheet. Post Intubation primary RN took over.

## 2024-04-11 NOTE — AIRWAY PLACEMENT NOTE ADULT - INDICATIONS:
Route for mechanical ventilation conducted a detailed discussion... I had a detailed discussion with the patient and/or guardian regarding the historical points, exam findings, and any diagnostic results supporting the discharge/admit diagnosis.

## 2024-04-11 NOTE — ED ADULT NURSE NOTE - OBJECTIVE STATEMENT
89yo M w/ PMH HTN presents to ED via EMS c/o diarrhea. Pt states "I ate vegetable soup and a turkey sandwich from a deli and all of a sudden had sudden onset of diarrhea tonight". Pt endorsing diffuse abdominal pain and shortness of breath. Denies any fevers/chills, sick contacts, recent abx use. A&Ox3. Strong peripheral pulses. Breathing spontaneously, labored, tachypneic. Audible wheezing noted, pt saturation 80% on room air, titrated up to 6 liters via nasal canula w/ minor improvement, pt placed on 15liters nonrebreather. Abdomen soft, distended, and diffusely tender on palpation. Skin warm dry intact and normal for ethnicity. Stretcher locked and in lowest position, appropriate side rails up. Pt instructed to notify RN if assistance is needed.

## 2024-04-11 NOTE — H&P ADULT - ASSESSMENT
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     Neuro:   - AOx3 at baseline   - Pt currently intubated and sedated   - no other active issues     Cardiovascular:   #HTN  - patient has history of hypertension   - on lisinopril and amlodipine at home   - can hold in setting of diarrheal episodes/hypovolemia  - no history of heart failure, CAD, renal disease    Respiratory:   #Acute hypoxic respiratory failure  - on presentation, pt hypoxic to 80%, NRB with no improvement  - denies any recent coughs, fevers, denies any allergies to food/drugs  - CXR showed clear lungs   - vbg pH 7.14, pCO2 73, lactate 2.8    - no history of heart failure or CAD   - consider possible allergic rxn/anaphylaxis versus aspiration given emesis episodes  - currently intubated  - check abg, monitor respiratory status, repeat CXR   - can obtain CT chest    Gastrointestinal:   #Gastroenteritis  - diarrheal episodes x 1 day duration after dinner  - afebrile, no leukocytosis, tachycardic to 100-120s bpm   - Obtain GI PCR, CT abdomen/pelvis   - NPO for now     Renal/:   #HERBERT   - Scr 1.48   - unknown baseline   - possible in setting of hypovolemia   - will continue to monitor     ID:   #gastroenteritis  - non-bloody diarrheal and emesis episodes  - afebrile, no leukocytosis   - GI PCR, CT abdomen/pelvis, CT chest     Endo:   - No active issues    Heme:   - No active issues    DVT PPx:  - heparin sq     Ethics:  - Full code   Daughter: Ronda Chu ([facility]- no number in chart, pt does not recall number/pt does not have his phone)        89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     Neuro:   - AOx3 at baseline   - Pt currently intubated and sedated   - no other active issues     Cardiovascular:   #HTN  - patient has history of hypertension   - on lisinopril and amlodipine at home   - can hold in setting of diarrheal episodes/hypovolemia  - no history of heart failure, CAD, renal disease    Respiratory:   #Acute hypoxic respiratory failure  - on presentation, pt hypoxic to 80%, NRB with no improvement  - denies any recent coughs, fevers, denies any allergies to food/drugs  - CXR showed clear lungs   - vbg pH 7.14, pCO2 73, lactate 2.8    - no history of heart failure or CAD   - consider possible allergic rxn/anaphylaxis versus aspiration given emesis episodes  - currently intubated  - check abg, monitor respiratory status, repeat CXR, obtain RVP   - CT chest     Gastrointestinal:   #Gastroenteritis  - diarrheal episodes x 1 day duration after dinner  - afebrile, no leukocytosis, tachycardic to 100-120s bpm   - Obtain GI PCR, CT abdomen/pelvis   - NPO for now     Renal/:   #HERBERT   - Scr 1.48   - unknown baseline   - possible in setting of hypovolemia   - will continue to monitor     ID:   #gastroenteritis  - non-bloody diarrheal and emesis episodes  - afebrile, no leukocytosis   - GI PCR, CT abdomen/pelvis, CT chest     Endo:   - No active issues    Heme:   - No active issues    DVT PPx:  - heparin sq     Ethics:  - Full code   Daughter: Ronda Chu ([facility]- no number in chart, pt does not recall number/pt does not have his phone)

## 2024-04-11 NOTE — ED ADULT NURSE NOTE - NSFALLHARMRISKINTERV_ED_ALL_ED

## 2024-04-11 NOTE — ED PROVIDER NOTE - CADM POA CENTRAL LINE
[de-identified] : Ms. 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 bleed s/p surgery, resulting decreased vision, h/o sickle cell crisis with coma, osteoporosis, hip replacement.\par \par Weight hx: 170 lbs, BMI 30. Gained 40 lbs between 2013 and 2019, Was on steroids for eyes off and on until 2017.\par \par Workup:\par - 10/22/18 US (scanned): mild-moderate hepatomegaly with steatosis, cholelithiasis in a contracted GB. 
No

## 2024-04-11 NOTE — CONSULT NOTE ADULT - ATTENDING COMMENTS
89 yo man with history of HTN and BPPV who presented with diarrhea and SOB. Found to be in AHRF requiring intubation. Course complicated by hypotension with EKG showing MARY in inferolateral leads concerning for ACS. Cardiology consulted. EKG done showed MARY in leads v2-v6 and submm MARY in II, III, aVF. Repeat EKGs showed resolution of his MARY.   STAT TTE was done which was technically difficult, but subcostal views showed preserved LV EF.     Initial presentation consistent with sepsis and AHRF from pneumonia. CT shows extensive pneumonia with colitis as well. I presume his transient hypotension is what led to the MARY on EKG. He likely has underlying ischemic disease given his age, but I do not think his presentation is primary ACS. Given that his pressor requirements are resolving, his LVEF on TTE is wnl, and his STEs are resolving I would prioritize treating his infection.     Would trend the troponins and obtain an official echo with echocontrast

## 2024-04-11 NOTE — H&P ADULT - NSHPLABSRESULTS_GEN_ALL_CORE
.  LABS:                         15.3   8.24  )-----------( 143      ( 11 Apr 2024 02:36 )             46.0     04-11    145  |  106  |  29<H>  ----------------------------<  170<H>  3.6   |  24  |  1.48<H>    Ca    10.2      11 Apr 2024 02:36  Phos  2.4     04-11  Mg     2.2     04-11    TPro  6.8  /  Alb  4.2  /  TBili  0.5  /  DBili  x   /  AST  16  /  ALT  14  /  AlkPhos  70  04-11      Urinalysis Basic - ( 11 Apr 2024 02:36 )    Color: x / Appearance: x / SG: x / pH: x  Gluc: 170 mg/dL / Ketone: x  / Bili: x / Urobili: x   Blood: x / Protein: x / Nitrite: x   Leuk Esterase: x / RBC: x / WBC x   Sq Epi: x / Non Sq Epi: x / Bacteria: x            RADIOLOGY, EKG & ADDITIONAL TESTS: Reviewed.

## 2024-04-11 NOTE — ED PROVIDER NOTE - CLINICAL SUMMARY MEDICAL DECISION MAKING FREE TEXT BOX
Patient is a 89-year-old male reported past medical history hypertension presents complaining of diffuse watery diarrhea, NBNB emesis which started tonight after having a bowl of vegetable soup while eating out.   VSS, hypoxic on room air to 84%, 90% on nrb  On exam patient see attg note

## 2024-04-11 NOTE — ED ADULT NURSE NOTE - NS ED NURSE TRANSPORT WITH
ED KEREN AMIN, ED MD KIARRA PERSONELI and RESP/Cardiac Monitor/Defib/ACLS/Rescue Kit/O2/BVM/IV pump/pulse ox/ventilator/ACLS Rescue Kit

## 2024-04-11 NOTE — ED PROVIDER NOTE - PROGRESS NOTE DETAILS
Attending Jose Angel Barnett:  Patient only received 800cc of volume. Never hypotension at any point. Labs largely nonactionable with pco2 56, ph 7.31. During short ED course, rapidly continued to deteriorate from a resp standpoint. Still with poor air movement, scant exp wheeze, slight crackles at lung bases. Discussion had with patient re: GOC. -> FULL CODE. Wanted us to call daughter Ronda Chu (lives in [facility]) but no number in chart, patient does not remember number, and phone is at his home in a cupboard?. Patient remained aaox3 throughout ED course and shared decision for intubation as patient did not feel like he was improving/tolerating the bipap very well.  Working diagnosis is aspiration pneumonitis with likely progression to ARDS physiology given 100% fio2 on vent, but prelim treatment as a presumed anaphylaxis given resp wheeze, vomiting and diarrhea after a meal, though no reported food allergies. MICU consulted. Attending Jose Angel Barnett:  ph 7.14, lactate 2.8, pco2 70s -> RR increased to 20. PEEP 8. FIO2 remains at 100%. Will obtain abg. Attending Jose Angel Barnett:  approx 06:50, called to CT as patient hypotensive after finishing noncontrast imaging, 50/30, on vent, no peak pressure concerns, fio02 70%, PEEP 15, no phenylephrine in cardiac transport box. Make 10cc of 10mcg/ml of push dose epi (9cc 0.9% saline flush with 1cc of 1:10.000 1mg/10ml epinephrine). Patient give 20mcg (2ml) of this push dose with some response in BP to systolic 70s, given a second 20mg (2ml) push with BP response to 90/50s approx, norepi now available and started at 0.05mcg/kg/min with improved in BP to 110/70s. Noted to have MARY on protable zoll, taken to [facility] room B where EKG showed STEMI with MARY in inferior and anterolateral leads. Verbal order of 5000U heparin. Cards consulted immediately (times based on first EKG showing STEMI approx 7:15a). OGT placed around 7:20a with confirmation via + return of gastric contents and ausculation over epigastrium. Verbal order at 07:25a for crushed asa to be given immediately via OGT w/o xray given other confirmator findings as well as to give the clopidogrel (also cards recs). Verbal instruction at same time to begin hep gtt at stated weight of 70kg given no formal weight was obtained on this patient prior as underdose of hep is likely less significant than over. Cards had STAT TTE by echo tech. Final plan pending whther cards to take patient to cath though I suspect that hypotension 2/2 vasoplegia from propofol was likely etiology of MARY (prop ordered at 20mcg inc to 50mcg/kg/min 2/2 patient beginning to trigger own breaths on route and fent already increased to 1.5mcg/kg/hr. MARY changed improved once MAP > 70. CTs still pending. Signed out to [facility] team Dr. Eli. MICU resident Dr. Ventura updated as to delay in transport of patient once CTs completed approx 7:35am.

## 2024-04-11 NOTE — H&P ADULT - NSHPSOCIALHISTORY_GEN_ALL_CORE
Lives at home  Daughter Ronda Chu ([facility]- no number in chart, pt does not recall number)   No substance use

## 2024-04-11 NOTE — H&P ADULT - NSHPPHYSICALEXAM_GEN_ALL_CORE
PHYSICAL EXAM:  GENERAL: Intubated and sedated   HEAD:  Atraumatic, Normocephalic  EYES: EOMI, PERRLA, conjunctiva and sclera clear  ENMT: No tonsillar erythema, exudates, or enlargement; Moist mucous membranes  NECK: Supple, No JVD, Normal thyroid  HEART: Regular rate and rhythm; No murmurs, rubs, or gallops  RESPIRATORY: mechanical breath sounds bilaterally   ABDOMEN: Soft, Nontender, Nondistended; Bowel sounds present  NEUROLOGY: Currently intubated/sedated. AOx3 at baseline.   EXTREMITIES:  2+ Peripheral Pulses, No clubbing, cyanosis, or edema  SKIN: warm, dry, normal color, no rash or abnormal lesions

## 2024-04-11 NOTE — CONSULT NOTE ADULT - SUBJECTIVE AND OBJECTIVE BOX
Patient seen and evaluated at bedside    Chief Complaint: GI sx, SOB     HPI:  89yo male with pasty medical history of HTN, benign postural vertigo, presenting with diffuse watery diarrhea x 1 day duration. Diarrheal episodes and non-bloody, non-bilious emesis started after having vegetable soup and turkey sandwich for dinner. Patient reports multiple episodes of loose watery diarrhea, unable to control, and had an episode of watery stool in ED. Patient was noted to be hypoxic with crackles and wheezing, hypoxic to 80% on room air, requiring non-rebreather 10L, patient denies any food or drug allergies, no recent medication changes, no antibiotics, fevers, or chills, no recent travels, no sick contacts, no rashes. Patient subsequently continued to be hypoxic while on NRB, and subsequently intubated while in emergency room. Patient currently admitted to MICU in the setting of acute hypoxic respiratory failure requiring intubation.  (11 Apr 2024 04:55)    Patient was being transported to the MICU when he became hypotensive with BP 50/30s. He was given epi as well as started on a levo gtt with improvement in his BPs. EKG done showed MARY in leads v2-v6 and submm MARY in II, III, aVF. Repeat EKGs showed resolution of his MARY. STAT TTE was done which was technically difficult, but subcostal views showed preserved LV EF. His pressor requirements are coming down.       PMHx:   Hypertension        Current Meds:   aspirin Suppository 600 milliGRAM(s) Rectal once  chlorhexidine 0.12% Liquid 15 milliLiter(s) Oral Mucosa every 12 hours  fentaNYL   Infusion... 0.5 MICROgram(s)/kG/Hr IV Continuous <Continuous>  heparin   Injectable 4000 Unit(s) IV Push every 6 hours PRN  heparin  Infusion.  Unit(s)/Hr IV Continuous <Continuous>  norepinephrine Infusion 0.05 MICROgram(s)/kG/Min IV Continuous <Continuous>  piperacillin/tazobactam IVPB. 3.375 Gram(s) IV Intermittent once  piperacillin/tazobactam IVPB.- 3.375 Gram(s) IV Intermittent once  piperacillin/tazobactam IVPB.- 3.375 Gram(s) IV Intermittent once  propofol Infusion 20 MICROgram(s)/kG/Min IV Continuous <Continuous>  propofol Infusion 10 MICROgram(s)/kG/Min IV Continuous <Continuous>      Allergies:  iodine (Other)      REVIEW OF SYSTEMS:  unable to assess.     Physical Exam:  T(F): 97.8 (04-11), Max: 97.8 (04-11)  HR: 72 (04-11) (70 - 140)  BP: 93/66 (04-11) (56/35 - 150/92)  RR: 20 (04-11)  SpO2: 98% (04-11)  GENERAL: intubated. sedated.   CHEST/LUNG: Clear to auscultation anteriorly   BACK: No spinal tenderness  HEART: Regular rate and rhythm; No murmurs, rubs, or gallops  ABDOMEN: Soft, Nontender, Nondistended; Bowel sounds present  EXTREMITIES:  No clubbing, cyanosis, or edema  PSYCH: Nl behavior, nl affect  NEUROLOGY: AAOx3, non-focal, cranial nerves intact  SKIN: Normal color, No rashes or lesions  LINES:    Imaging:    Labs: Personally reviewed                        16.3   6.20  )-----------( 131      ( 11 Apr 2024 07:06 )             49.8     04-11    145  |  106  |  29<H>  ----------------------------<  170<H>  3.6   |  24  |  1.48<H>    Ca    10.2      11 Apr 2024 02:36  Phos  2.4     04-11  Mg     2.2     04-11    TPro  6.8  /  Alb  4.2  /  TBili  0.5  /  DBili  x   /  AST  16  /  ALT  14  /  AlkPhos  70  04-11      CARDIAC MARKERS ( 11 Apr 2024 02:36 )  30 ng/L / x     / x     / x     / x     / x          CT CAP   IMPRESSION:  Scattered bilateral groundglass opacities and left lower lobe   consolidation, concerning for pneumonia.    Debris within the bilateral lower lobe bronchi and obliteration of the   left lower lobe bronchus.    No bowel obstruction. Colitis involving the ascending, transverse and   proximal descending colon.    Partially calcified nodule along the posterior gastric wall, concerning   for neoplasm. Endoscopy suggested for further evaluation.    Left adrenal 9 mm hypodense nodule. Consider nonemergent dedicated   adrenal CT or MRI for further evaluation.

## 2024-04-11 NOTE — CHART NOTE - NSCHARTNOTEFT_GEN_A_CORE
: ALEXANDRA Morse MD  INDICATION: Shock    PROCEDURE:  [ x] LIMITED ECHO  [ ] LIMITED CHEST  [ ] LIMITED RETROPERITONEAL  [ ] OTHER    FINDINGS: No pericardial effusion.  Grossly normal LV systolic function, grossly normal RV systolic function.  RVOT VTI 13.9cm.  IVC 2.3cm and minimal distensibility.      INTERPRETATION: Normal GDE, IVC >2.1cm without distensibility.    -Alen Morse M.D.   PGY-5 EM/IM/CC  Pager #[phone]

## 2024-04-11 NOTE — PATIENT PROFILE ADULT - FALL HARM RISK - HARM RISK INTERVENTIONS

## 2024-04-11 NOTE — H&P ADULT - NSHPREVIEWOFSYSTEMS_GEN_ALL_CORE
REVIEW OF SYSTEMS:    CONSTITUTIONAL: No weakness, fevers or chills  EYES/ENT: No visual changes;  No vertigo or throat pain   NECK: No pain or stiffness  RESPIRATORY: + shortness of breath. No cough, wheezing, hemoptysis;   CARDIOVASCULAR: No chest pain or palpitations  GASTROINTESTINAL: No abdominal or epigastric pain. No nausea, vomiting, or hematemesis; + diarrhea, emesis. No constipation. No melena or hematochezia.  GENITOURINARY: No dysuria, frequency or hematuria  NEUROLOGICAL: No numbness or weakness  SKIN: No itching, rashes

## 2024-04-11 NOTE — ED PROVIDER NOTE - ATTENDING CONTRIBUTION TO CARE
Attending (Jose Angel Barnett D.O.): I have personally seen and examined this patient. I have performed a substantive portion of the visit including all aspects of the medical decision making. Resident, Fellow, and/or ACP note reviewed. I agree on the plan of care except where noted.    I have personally provided 75 minutes of critical care concurrently with the resident(s) and/or ACP(s), excluding time spent on separate procedures.    89M hx of HTN on 3 meds here for diffuse abdominal discomfort with NBNB emesis and loose nonbloody watery stool w/o recent steroids, abxs. Was just eating out. Had a large bowl of veggie soup. No hx of CAD/ renal disease/ heart failure. Nonsmoker. Denies trauma, fever, chills, recent travel.    Patient here appears fatigued, hypoxic to 80% on ra, audible crackles with poor air movement bilateral, worse on left with exp wheeze on R, + rales at left lung base. -> placed on NRB 10L/min while stabilizing -> No audible cardiac murmurs neurovasc intact,+ distended abdomen but nontender, no peritoneal signs. No e/o anemia, no rash, no signs of volume overload.     Will presume allergic rxn/anaphalxis, do not think primary cardiac etiology. Low suspicion for c-diff. Check for metabolic derrangement. Check labs, vbg, cxr, hydrate, epi, nebs. Attending (Jose Angel aBrnett D.O.): I have personally seen and examined this patient. I have performed a substantive portion of the visit including all aspects of the medical decision making. Resident, Fellow, and/or ACP note reviewed. I agree on the plan of care except where noted.    I have personally provided 75 minutes of critical care concurrently with the resident(s) and/or ACP(s), excluding time spent on separate procedures.    89M hx of HTN on 3 meds here for diffuse abdominal discomfort with NBNB emesis and loose nonbloody watery stool w/o recent steroids, abxs. Was just eating out. Had a large bowl of veggie soup. No hx of CAD/ renal disease/ heart failure. Nonsmoker. Denies trauma, fever, chills, recent travel.    Patient here appears fatigued, hypoxic to 80% on ra, audible crackles with poor air movement bilateral, worse on left with exp wheeze on R, + rales at left lung base. -> placed on NRB 10L/min while stabilizing -> No audible cardiac murmurs neurovasc intact,+ distended abdomen but nontender, no peritoneal signs. No e/o anemia, no rash, no signs of volume overload.     Will presume allergic rxn/anaphalxis, do not think primary cardiac etiology. Maybe aspiration as patient stated he vomited at home but does not remember after. Low suspicion for c-diff. Check for metabolic derrangement. Check labs, vbg, cxr, hydrate, epi, nebs. Attending (Jose Angel Barnett D.O.): I have personally seen and examined this patient. I have performed a substantive portion of the visit including all aspects of the medical decision making. Resident, Fellow, and/or ACP note reviewed. I agree on the plan of care except where noted.    I have personally provided 180 minutes of critical care concurrently with the resident(s) and/or ACP(s), excluding time spent on separate procedures.    89M hx of HTN on 3 meds here for diffuse abdominal discomfort with NBNB emesis and loose nonbloody watery stool w/o recent steroids, abxs. Was just eating out. Had a large bowl of veggie soup. No hx of CAD/ renal disease/ heart failure. Nonsmoker. Denies trauma, fever, chills, recent travel.    Patient here appears fatigued, hypoxic to 80% on ra, audible crackles with poor air movement bilateral, worse on left with exp wheeze on R, + rales at left lung base. -> placed on NRB 10L/min while stabilizing -> No audible cardiac murmurs neurovasc intact,+ distended abdomen but nontender, no peritoneal signs. No e/o anemia, no rash, no signs of volume overload.     Will presume allergic rxn/anaphalxis, do not think primary cardiac etiology. Maybe aspiration as patient stated he vomited at home but does not remember after. Low suspicion for c-diff. Check for metabolic derrangement. Check labs, vbg, cxr, hydrate, epi, nebs.

## 2024-04-11 NOTE — ED PROCEDURE NOTE - PROCEDURE ADDITIONAL DETAILS
Orogastric tube placed. Gurgling ausculated over stomach with insufflation of air via piston syringe. Positive return of gastric contents Tube placement confirmed with CXR

## 2024-04-11 NOTE — ED ADULT NURSE REASSESSMENT NOTE - NS ED NURSE REASSESS COMMENT FT1
Patient at CT SCAN became hypotensive - see flowsheet for more details. Brought back to ED [facility] room B - patient on levo at .12mcg, fent at 2.5mcg, and prop at 20mcg. Report given to ED RN Anastacia Downs. MICU RN Marzena updated about patient's status. Dispo unclear at this time.

## 2024-04-11 NOTE — ED ADULT NURSE REASSESSMENT NOTE - NS ED NURSE REASSESS COMMENT FT1
Pt remains in critical condition. MICU MD at bedside, Per MD Ok to start heparin gtt with stated weight. Pt to be transported to MICU.

## 2024-04-11 NOTE — ED PROVIDER NOTE - OBJECTIVE STATEMENT
Patient is a 89-year-old male reported past medical history hypertension presents complaining of diffuse watery diarrhea, NBNB emesis which started tonight after having a bowl of vegetable soup while eating out.  Patient notes he has had multiple episodes of his loose watery diarrhea which she is not able to control and had a episode here while on the stretcher.  Patient also noted to be hypoxic by EMS with audible crackles and appears fatigued.  Here patient was noted to be hypoxic to 80% on room air.  Patient is able to speak in complete sentences.  Denies recent antibiotic use denies fevers, chills, denies any known food or drug allergies.  Patient denies any rashes

## 2024-04-11 NOTE — CONSULT NOTE ADULT - ASSESSMENT
89 yo man with history of HTN and BPPV who presented with diarrhea and SOB. Found to be in AHRF requiring intubation. Course complicated by hypotension with EKG showing MARY in inferolateral leads concerning for ACS. Cardiology consulted.     EKG: EKG done showed MARY in leads v2-v6 and submm MARY in II, III, aVF. Repeat EKGs showed resolution of his MARY.   STAT TTE was done which was technically difficult, but subcostal views showed preserved LV EF.     Initial presentation consistent with sepsis and AHRF from pneumonia. CT shows extensive pneumonia with colitis as well. I presume his transient hypotension is what led to the MARY on EKG. He likely has underlying ischemic disease given his age, but I do not think his presentation is primary ACS. Given that his pressor requirements are resolving, his LVEF on TTE is wnl, and his STEs are resolving I would prioritize treating his infection.     Recommendations:  -monitor on telemetry   -load with ASA 324mg, plavix 600mg, heparin bolus + gtt   -start atorvastatin 80mg daily   -check TTE   -risk stratification labs: TSH, A1c, and lipid profile   -treatment of active infection   -can revisit ischemic eval once clinically stable and infection improves     Please see attending attestation for final recommendations.     Adam Laird MD  Cardiology Fellow

## 2024-04-11 NOTE — PATIENT PROFILE ADULT - HAS THE PATIENT RECEIVED THE INFLUENZA VACCINE THIS SEASON?
Increase your lexapro to 15 mg = 1 1/2 tablets daily.  Continue with counseling.  Follow up with me in 4-6 weeks.    Continue with flovent every day, no change.  Continue with albuterol as needed.  Follow up with our allergist regarding concern for food allergy.    If your bleeding doesn't get better over the next 1-2 months, let me know.  We'll confirm your hemoglobin is okay.  I'll send results through [employer].  
unable to assess immunization status...

## 2024-04-12 ENCOUNTER — RESULT REVIEW (OUTPATIENT)
Age: 89
End: 2024-04-12

## 2024-04-12 LAB
ALBUMIN SERPL ELPH-MCNC: 3.3 G/DL — SIGNIFICANT CHANGE UP (ref 3.3–5)
ALBUMIN SERPL ELPH-MCNC: 3.6 G/DL — SIGNIFICANT CHANGE UP (ref 3.3–5)
ALP SERPL-CCNC: 51 U/L — SIGNIFICANT CHANGE UP (ref 40–120)
ALP SERPL-CCNC: 52 U/L — SIGNIFICANT CHANGE UP (ref 40–120)
ALT FLD-CCNC: 16 U/L — SIGNIFICANT CHANGE UP (ref 10–45)
ALT FLD-CCNC: 17 U/L — SIGNIFICANT CHANGE UP (ref 10–45)
ANION GAP SERPL CALC-SCNC: 14 MMOL/L — SIGNIFICANT CHANGE UP (ref 5–17)
ANION GAP SERPL CALC-SCNC: 15 MMOL/L — SIGNIFICANT CHANGE UP (ref 5–17)
APTT BLD: 46.4 SEC — HIGH (ref 24.5–35.6)
APTT BLD: 48.9 SEC — HIGH (ref 24.5–35.6)
APTT BLD: 50.2 SEC — HIGH (ref 24.5–35.6)
APTT BLD: 63.8 SEC — HIGH (ref 24.5–35.6)
AST SERPL-CCNC: 46 U/L — HIGH (ref 10–40)
AST SERPL-CCNC: 47 U/L — HIGH (ref 10–40)
BASOPHILS # BLD AUTO: 0 K/UL — SIGNIFICANT CHANGE UP (ref 0–0.2)
BASOPHILS # BLD AUTO: 0.02 K/UL — SIGNIFICANT CHANGE UP (ref 0–0.2)
BASOPHILS NFR BLD AUTO: 0 % — SIGNIFICANT CHANGE UP (ref 0–2)
BASOPHILS NFR BLD AUTO: 0.2 % — SIGNIFICANT CHANGE UP (ref 0–2)
BILIRUB SERPL-MCNC: 0.2 MG/DL — SIGNIFICANT CHANGE UP (ref 0.2–1.2)
BILIRUB SERPL-MCNC: 0.4 MG/DL — SIGNIFICANT CHANGE UP (ref 0.2–1.2)
BUN SERPL-MCNC: 32 MG/DL — HIGH (ref 7–23)
BUN SERPL-MCNC: 34 MG/DL — HIGH (ref 7–23)
CALCIUM SERPL-MCNC: 8.8 MG/DL — SIGNIFICANT CHANGE UP (ref 8.4–10.5)
CALCIUM SERPL-MCNC: 9.2 MG/DL — SIGNIFICANT CHANGE UP (ref 8.4–10.5)
CHLORIDE SERPL-SCNC: 105 MMOL/L — SIGNIFICANT CHANGE UP (ref 96–108)
CHLORIDE SERPL-SCNC: 107 MMOL/L — SIGNIFICANT CHANGE UP (ref 96–108)
CHOLEST SERPL-MCNC: 165 MG/DL — SIGNIFICANT CHANGE UP
CK MB BLD-MCNC: 14.3 % — HIGH (ref 0–3.5)
CK MB BLD-MCNC: 15 % — HIGH (ref 0–3.5)
CK MB BLD-MCNC: 15.3 % — HIGH (ref 0–3.5)
CK MB BLD-MCNC: 15.8 % — HIGH (ref 0–3.5)
CK MB CFR SERPL CALC: 38.1 NG/ML — HIGH (ref 0–6.7)
CK MB CFR SERPL CALC: 48 NG/ML — HIGH (ref 0–6.7)
CK MB CFR SERPL CALC: 48.2 NG/ML — HIGH (ref 0–6.7)
CK MB CFR SERPL CALC: 50.5 NG/ML — HIGH (ref 0–6.7)
CK MB CFR SERPL CALC: 51.8 NG/ML — HIGH (ref 0–6.7)
CK SERPL-CCNC: 267 U/L — HIGH (ref 30–200)
CK SERPL-CCNC: 303 U/L — HIGH (ref 30–200)
CK SERPL-CCNC: 321 U/L — HIGH (ref 30–200)
CK SERPL-CCNC: 338 U/L — HIGH (ref 30–200)
CLOSURE TME COLL+EPINEP BLD: SIGNIFICANT CHANGE UP (ref 150–400)
CO2 SERPL-SCNC: 20 MMOL/L — LOW (ref 22–31)
CO2 SERPL-SCNC: 22 MMOL/L — SIGNIFICANT CHANGE UP (ref 22–31)
CREAT SERPL-MCNC: 1.3 MG/DL — SIGNIFICANT CHANGE UP (ref 0.5–1.3)
CREAT SERPL-MCNC: 1.41 MG/DL — HIGH (ref 0.5–1.3)
CULTURE RESULTS: NO GROWTH — SIGNIFICANT CHANGE UP
CULTURE RESULTS: NO GROWTH — SIGNIFICANT CHANGE UP
EGFR: 47 ML/MIN/1.73M2 — LOW
EGFR: 52 ML/MIN/1.73M2 — LOW
EOSINOPHIL # BLD AUTO: 0 K/UL — SIGNIFICANT CHANGE UP (ref 0–0.5)
EOSINOPHIL # BLD AUTO: 0.08 K/UL — SIGNIFICANT CHANGE UP (ref 0–0.5)
EOSINOPHIL NFR BLD AUTO: 0 % — SIGNIFICANT CHANGE UP (ref 0–6)
EOSINOPHIL NFR BLD AUTO: 0.9 % — SIGNIFICANT CHANGE UP (ref 0–6)
GAS PNL BLDA: SIGNIFICANT CHANGE UP
GLUCOSE BLDC GLUCOMTR-MCNC: 112 MG/DL — HIGH (ref 70–99)
GLUCOSE BLDC GLUCOMTR-MCNC: 144 MG/DL — HIGH (ref 70–99)
GLUCOSE SERPL-MCNC: 167 MG/DL — HIGH (ref 70–99)
GLUCOSE SERPL-MCNC: 184 MG/DL — HIGH (ref 70–99)
HCT VFR BLD CALC: 38.3 % — LOW (ref 39–50)
HCT VFR BLD CALC: 41.2 % — SIGNIFICANT CHANGE UP (ref 39–50)
HCT VFR BLD CALC: 41.4 % — SIGNIFICANT CHANGE UP (ref 39–50)
HDLC SERPL-MCNC: 54 MG/DL — SIGNIFICANT CHANGE UP
HGB BLD-MCNC: 13.2 G/DL — SIGNIFICANT CHANGE UP (ref 13–17)
HGB BLD-MCNC: 13.8 G/DL — SIGNIFICANT CHANGE UP (ref 13–17)
HGB BLD-MCNC: 14 G/DL — SIGNIFICANT CHANGE UP (ref 13–17)
IMM GRANULOCYTES NFR BLD AUTO: 0.7 % — SIGNIFICANT CHANGE UP (ref 0–0.9)
INR BLD: 1.23 RATIO — HIGH (ref 0.85–1.18)
LEGIONELLA AG UR QL: NEGATIVE — SIGNIFICANT CHANGE UP
LIPID PNL WITH DIRECT LDL SERPL: 102 MG/DL — HIGH
LYMPHOCYTES # BLD AUTO: 0.46 K/UL — LOW (ref 1–3.3)
LYMPHOCYTES # BLD AUTO: 0.75 K/UL — LOW (ref 1–3.3)
LYMPHOCYTES # BLD AUTO: 5.1 % — LOW (ref 13–44)
LYMPHOCYTES # BLD AUTO: 6.1 % — LOW (ref 13–44)
MAGNESIUM SERPL-MCNC: 2.2 MG/DL — SIGNIFICANT CHANGE UP (ref 1.6–2.6)
MAGNESIUM SERPL-MCNC: 2.2 MG/DL — SIGNIFICANT CHANGE UP (ref 1.6–2.6)
MANUAL SMEAR VERIFICATION: SIGNIFICANT CHANGE UP
MCHC RBC-ENTMCNC: 29.9 PG — SIGNIFICANT CHANGE UP (ref 27–34)
MCHC RBC-ENTMCNC: 30 PG — SIGNIFICANT CHANGE UP (ref 27–34)
MCHC RBC-ENTMCNC: 30.3 PG — SIGNIFICANT CHANGE UP (ref 27–34)
MCHC RBC-ENTMCNC: 33.5 GM/DL — SIGNIFICANT CHANGE UP (ref 32–36)
MCHC RBC-ENTMCNC: 33.8 GM/DL — SIGNIFICANT CHANGE UP (ref 32–36)
MCHC RBC-ENTMCNC: 34.5 GM/DL — SIGNIFICANT CHANGE UP (ref 32–36)
MCV RBC AUTO: 87.8 FL — SIGNIFICANT CHANGE UP (ref 80–100)
MCV RBC AUTO: 88.7 FL — SIGNIFICANT CHANGE UP (ref 80–100)
MCV RBC AUTO: 89.4 FL — SIGNIFICANT CHANGE UP (ref 80–100)
MONOCYTES # BLD AUTO: 0.15 K/UL — SIGNIFICANT CHANGE UP (ref 0–0.9)
MONOCYTES # BLD AUTO: 0.44 K/UL — SIGNIFICANT CHANGE UP (ref 0–0.9)
MONOCYTES NFR BLD AUTO: 1.7 % — LOW (ref 2–14)
MONOCYTES NFR BLD AUTO: 3.6 % — SIGNIFICANT CHANGE UP (ref 2–14)
MRSA PCR RESULT.: SIGNIFICANT CHANGE UP
NEUTROPHILS # BLD AUTO: 10.92 K/UL — HIGH (ref 1.8–7.4)
NEUTROPHILS # BLD AUTO: 8.38 K/UL — HIGH (ref 1.8–7.4)
NEUTROPHILS NFR BLD AUTO: 64.1 % — SIGNIFICANT CHANGE UP (ref 43–77)
NEUTROPHILS NFR BLD AUTO: 89.4 % — HIGH (ref 43–77)
NEUTS BAND # BLD: 28.2 % — HIGH (ref 0–8)
NON HDL CHOLESTEROL: 111 MG/DL — SIGNIFICANT CHANGE UP
NRBC # BLD: 0 /100 WBCS — SIGNIFICANT CHANGE UP (ref 0–0)
NRBC # BLD: 0 /100 WBCS — SIGNIFICANT CHANGE UP (ref 0–0)
PHOSPHATE SERPL-MCNC: 3.3 MG/DL — SIGNIFICANT CHANGE UP (ref 2.5–4.5)
PHOSPHATE SERPL-MCNC: 3.7 MG/DL — SIGNIFICANT CHANGE UP (ref 2.5–4.5)
PLAT MORPH BLD: NORMAL — SIGNIFICANT CHANGE UP
PLATELET # BLD AUTO: 114 K/UL — LOW (ref 150–400)
PLATELET # BLD AUTO: 125 K/UL — LOW (ref 150–400)
PLATELET # BLD AUTO: 76 K/UL — LOW (ref 150–400)
POTASSIUM SERPL-MCNC: 3.7 MMOL/L — SIGNIFICANT CHANGE UP (ref 3.5–5.3)
POTASSIUM SERPL-MCNC: 3.9 MMOL/L — SIGNIFICANT CHANGE UP (ref 3.5–5.3)
POTASSIUM SERPL-SCNC: 3.7 MMOL/L — SIGNIFICANT CHANGE UP (ref 3.5–5.3)
POTASSIUM SERPL-SCNC: 3.9 MMOL/L — SIGNIFICANT CHANGE UP (ref 3.5–5.3)
PROT SERPL-MCNC: 5.9 G/DL — LOW (ref 6–8.3)
PROT SERPL-MCNC: 5.9 G/DL — LOW (ref 6–8.3)
PROTHROM AB SERPL-ACNC: 13.5 SEC — HIGH (ref 9.5–13)
RBC # BLD: 4.36 M/UL — SIGNIFICANT CHANGE UP (ref 4.2–5.8)
RBC # BLD: 4.61 M/UL — SIGNIFICANT CHANGE UP (ref 4.2–5.8)
RBC # BLD: 4.67 M/UL — SIGNIFICANT CHANGE UP (ref 4.2–5.8)
RBC # FLD: 13.7 % — SIGNIFICANT CHANGE UP (ref 10.3–14.5)
RBC # FLD: 13.7 % — SIGNIFICANT CHANGE UP (ref 10.3–14.5)
RBC # FLD: 13.8 % — SIGNIFICANT CHANGE UP (ref 10.3–14.5)
RBC BLD AUTO: NORMAL — SIGNIFICANT CHANGE UP
S AUREUS DNA NOSE QL NAA+PROBE: SIGNIFICANT CHANGE UP
SODIUM SERPL-SCNC: 140 MMOL/L — SIGNIFICANT CHANGE UP (ref 135–145)
SODIUM SERPL-SCNC: 143 MMOL/L — SIGNIFICANT CHANGE UP (ref 135–145)
SPECIMEN SOURCE: SIGNIFICANT CHANGE UP
SPECIMEN SOURCE: SIGNIFICANT CHANGE UP
T4 FREE SERPL-MCNC: 1.4 NG/DL — SIGNIFICANT CHANGE UP (ref 0.9–1.8)
TRIGL SERPL-MCNC: 43 MG/DL — SIGNIFICANT CHANGE UP
TROPONIN T, HIGH SENSITIVITY RESULT: 1005 NG/L — HIGH (ref 0–51)
TROPONIN T, HIGH SENSITIVITY RESULT: 794 NG/L — HIGH (ref 0–51)
TROPONIN T, HIGH SENSITIVITY RESULT: 817 NG/L — HIGH (ref 0–51)
TROPONIN T, HIGH SENSITIVITY RESULT: 844 NG/L — HIGH (ref 0–51)
TROPONIN T, HIGH SENSITIVITY RESULT: 915 NG/L — HIGH (ref 0–51)
TSH SERPL-MCNC: 2.74 UIU/ML — SIGNIFICANT CHANGE UP (ref 0.27–4.2)
WBC # BLD: 12.12 K/UL — HIGH (ref 3.8–10.5)
WBC # BLD: 12.21 K/UL — HIGH (ref 3.8–10.5)
WBC # BLD: 9.08 K/UL — SIGNIFICANT CHANGE UP (ref 3.8–10.5)
WBC # FLD AUTO: 12.12 K/UL — HIGH (ref 3.8–10.5)
WBC # FLD AUTO: 12.21 K/UL — HIGH (ref 3.8–10.5)
WBC # FLD AUTO: 9.08 K/UL — SIGNIFICANT CHANGE UP (ref 3.8–10.5)

## 2024-04-12 PROCEDURE — 99291 CRITICAL CARE FIRST HOUR: CPT

## 2024-04-12 PROCEDURE — 99233 SBSQ HOSP IP/OBS HIGH 50: CPT | Mod: GC

## 2024-04-12 PROCEDURE — 93306 TTE W/DOPPLER COMPLETE: CPT | Mod: 26

## 2024-04-12 RX ORDER — HEPARIN SODIUM 5000 [USP'U]/ML
4000 INJECTION INTRAVENOUS; SUBCUTANEOUS EVERY 6 HOURS
Refills: 0 | Status: DISCONTINUED | OUTPATIENT
Start: 2024-04-12 | End: 2024-04-13

## 2024-04-12 RX ORDER — HEPARIN SODIUM 5000 [USP'U]/ML
850 INJECTION INTRAVENOUS; SUBCUTANEOUS
Qty: 25000 | Refills: 0 | Status: DISCONTINUED | OUTPATIENT
Start: 2024-04-12 | End: 2024-04-13

## 2024-04-12 RX ORDER — DEXMEDETOMIDINE HYDROCHLORIDE IN 0.9% SODIUM CHLORIDE 4 UG/ML
0.7 INJECTION INTRAVENOUS
Qty: 200 | Refills: 0 | Status: DISCONTINUED | OUTPATIENT
Start: 2024-04-12 | End: 2024-04-13

## 2024-04-12 RX ORDER — DEXMEDETOMIDINE HYDROCHLORIDE IN 0.9% SODIUM CHLORIDE 4 UG/ML
0.5 INJECTION INTRAVENOUS
Qty: 400 | Refills: 0 | Status: DISCONTINUED | OUTPATIENT
Start: 2024-04-12 | End: 2024-04-12

## 2024-04-12 RX ORDER — HEPARIN SODIUM 5000 [USP'U]/ML
700 INJECTION INTRAVENOUS; SUBCUTANEOUS
Qty: 25000 | Refills: 0 | Status: DISCONTINUED | OUTPATIENT
Start: 2024-04-12 | End: 2024-04-12

## 2024-04-12 RX ORDER — DEXMEDETOMIDINE HYDROCHLORIDE IN 0.9% SODIUM CHLORIDE 4 UG/ML
0.5 INJECTION INTRAVENOUS
Qty: 200 | Refills: 0 | Status: DISCONTINUED | OUTPATIENT
Start: 2024-04-12 | End: 2024-04-12

## 2024-04-12 RX ADMIN — POLYETHYLENE GLYCOL 3350 17 GRAM(S): 17 POWDER, FOR SOLUTION ORAL at 11:58

## 2024-04-12 RX ADMIN — CHLORHEXIDINE GLUCONATE 1 APPLICATION(S): 213 SOLUTION TOPICAL at 05:19

## 2024-04-12 RX ADMIN — Medication 7.01 MICROGRAM(S)/KG/MIN: at 08:57

## 2024-04-12 RX ADMIN — DEXMEDETOMIDINE HYDROCHLORIDE IN 0.9% SODIUM CHLORIDE 9.11 MICROGRAM(S)/KG/HR: 4 INJECTION INTRAVENOUS at 08:57

## 2024-04-12 RX ADMIN — SODIUM CHLORIDE 4 MILLILITER(S): 9 INJECTION INTRAMUSCULAR; INTRAVENOUS; SUBCUTANEOUS at 05:08

## 2024-04-12 RX ADMIN — DEXMEDETOMIDINE HYDROCHLORIDE IN 0.9% SODIUM CHLORIDE 9.11 MICROGRAM(S)/KG/HR: 4 INJECTION INTRAVENOUS at 03:05

## 2024-04-12 RX ADMIN — PROPOFOL 8.98 MICROGRAM(S)/KG/MIN: 10 INJECTION, EMULSION INTRAVENOUS at 08:57

## 2024-04-12 RX ADMIN — CEFTRIAXONE 100 MILLIGRAM(S): 500 INJECTION, POWDER, FOR SOLUTION INTRAMUSCULAR; INTRAVENOUS at 08:09

## 2024-04-12 RX ADMIN — ATORVASTATIN CALCIUM 80 MILLIGRAM(S): 80 TABLET, FILM COATED ORAL at 12:04

## 2024-04-12 RX ADMIN — Medication 81 MILLIGRAM(S): at 12:27

## 2024-04-12 RX ADMIN — AZITHROMYCIN 255 MILLIGRAM(S): 500 TABLET, FILM COATED ORAL at 08:56

## 2024-04-12 RX ADMIN — HEPARIN SODIUM 850 UNIT(S)/HR: 5000 INJECTION INTRAVENOUS; SUBCUTANEOUS at 18:35

## 2024-04-12 RX ADMIN — Medication 3 MILLILITER(S): at 11:00

## 2024-04-12 RX ADMIN — CHLORHEXIDINE GLUCONATE 15 MILLILITER(S): 213 SOLUTION TOPICAL at 05:19

## 2024-04-12 RX ADMIN — CLOPIDOGREL BISULFATE 75 MILLIGRAM(S): 75 TABLET, FILM COATED ORAL at 12:28

## 2024-04-12 RX ADMIN — Medication 3 MILLILITER(S): at 23:30

## 2024-04-12 RX ADMIN — Medication 3 MILLILITER(S): at 17:42

## 2024-04-12 RX ADMIN — HEPARIN SODIUM 7 UNIT(S)/HR: 5000 INJECTION INTRAVENOUS; SUBCUTANEOUS at 01:07

## 2024-04-12 RX ADMIN — PANTOPRAZOLE SODIUM 40 MILLIGRAM(S): 20 TABLET, DELAYED RELEASE ORAL at 11:58

## 2024-04-12 RX ADMIN — Medication 3 MILLILITER(S): at 05:07

## 2024-04-12 RX ADMIN — SODIUM CHLORIDE 4 MILLILITER(S): 9 INJECTION INTRAMUSCULAR; INTRAVENOUS; SUBCUTANEOUS at 11:01

## 2024-04-12 NOTE — DIETITIAN INITIAL EVALUATION ADULT - PERTINENT MEDS FT
MEDICATIONS  (STANDING):  albuterol/ipratropium for Nebulization 3 milliLiter(s) Nebulizer every 6 hours  aspirin enteric coated 81 milliGRAM(s) Oral daily  atorvastatin 80 milliGRAM(s) Oral every 24 hours  azithromycin  IVPB 500 milliGRAM(s) IV Intermittent every 24 hours  azithromycin  IVPB      cefTRIAXone   IVPB 2000 milliGRAM(s) IV Intermittent every 24 hours  chlorhexidine 0.12% Liquid 15 milliLiter(s) Oral Mucosa every 12 hours  chlorhexidine 4% Liquid 1 Application(s) Topical <User Schedule>  clopidogrel Tablet 75 milliGRAM(s) Enteral Tube daily  dexMEDEtomidine Infusion 0.5 MICROgram(s)/kG/Hr (9.11 mL/Hr) IV Continuous <Continuous>  heparin  Infusion 700 Unit(s)/Hr (7 mL/Hr) IV Continuous <Continuous>  norepinephrine Infusion 0.05 MICROgram(s)/kG/Min (7.01 mL/Hr) IV Continuous <Continuous>  pantoprazole  Injectable 40 milliGRAM(s) IV Push daily  polyethylene glycol 3350 17 Gram(s) Oral daily  propofol Infusion 20.53 MICROgram(s)/kG/Min (8.98 mL/Hr) IV Continuous <Continuous>  senna Syrup 10 milliLiter(s) Oral at bedtime  sodium chloride 3%  Inhalation 4 milliLiter(s) Inhalation every 6 hours    MEDICATIONS  (PRN):  fentaNYL    Injectable 25 MICROGram(s) IV Push every 3 hours PRN Moderate Pain (4 - 6)

## 2024-04-12 NOTE — DIETITIAN INITIAL EVALUATION ADULT - ENTERAL
Vital 1.5 at 10 mL/hr increasing only as tolerated and electrolytes WNL to goal rate 65 mL/hr x18 hours to provide total volume 1170 mL, 1755 kcals, 79 gm protein, and 894 mL free water. Meets 24 kcals/kG and 1.1 gm protein/kG based on dosing wt 72.9 kG.

## 2024-04-12 NOTE — AIRWAY REMOVAL NOTE  ADULT & PEDS - ARTIFICAL AIRWAY REMOVAL COMMENTS
Patient tolerated the CPAP/PSV mode. Extubated per MD's order with the help of KEREN France by the bedside.

## 2024-04-12 NOTE — DIETITIAN INITIAL EVALUATION ADULT - NSFNSGIIOFT_GEN_A_CORE
04-11-24 @ 07:01  -  04-12-24 @ 07:00  --------------------------------------------------------  OUT:  Total OUT: 0 mL    Total NET: 320 mL      04-12-24 @ 07:01  -  04-12-24 @ 08:27  --------------------------------------------------------  OUT:    Jevity 1.2: 0 mL  Total OUT: 0 mL    Total NET: 0 mL

## 2024-04-12 NOTE — PROGRESS NOTE ADULT - SUBJECTIVE AND OBJECTIVE BOX
*Incomplete Note*    Patient seen and examined at bedside.    Overnight Events: Intubated/sedated. Had stat head CT overnight given c/f fixed pupils, unchanged.    Telemetry:    [ ] Unable to assess ROS due to intubated/sedated.    Current Meds:  albuterol/ipratropium for Nebulization 3 milliLiter(s) Nebulizer every 6 hours  aspirin enteric coated 81 milliGRAM(s) Oral daily  atorvastatin 80 milliGRAM(s) Oral every 24 hours  azithromycin  IVPB 500 milliGRAM(s) IV Intermittent every 24 hours  azithromycin  IVPB      cefTRIAXone   IVPB 2000 milliGRAM(s) IV Intermittent every 24 hours  chlorhexidine 0.12% Liquid 15 milliLiter(s) Oral Mucosa every 12 hours  chlorhexidine 4% Liquid 1 Application(s) Topical <User Schedule>  clopidogrel Tablet 75 milliGRAM(s) Enteral Tube daily  dexMEDEtomidine Infusion 0.5 MICROgram(s)/kG/Hr IV Continuous <Continuous>  fentaNYL    Injectable 25 MICROGram(s) IV Push every 3 hours PRN  heparin  Infusion 700 Unit(s)/Hr IV Continuous <Continuous>  norepinephrine Infusion 0.05 MICROgram(s)/kG/Min IV Continuous <Continuous>  pantoprazole  Injectable 40 milliGRAM(s) IV Push daily  polyethylene glycol 3350 17 Gram(s) Oral daily  propofol Infusion 20.53 MICROgram(s)/kG/Min IV Continuous <Continuous>  senna Syrup 10 milliLiter(s) Oral at bedtime  sodium chloride 3%  Inhalation 4 milliLiter(s) Inhalation every 6 hours      PAST MEDICAL & SURGICAL HISTORY:  Hypertension          Vitals:  T(F): 98.6 (-), Max: 100 ()  HR: 80 () (55 - 88)  BP: 97/64 () (88/65 - 97/64)  RR: 20 ()  SpO2: 96% ()  I&O's Summary    2024 07:01  -  2024 07:00  --------------------------------------------------------  IN: 1897.6 mL / OUT: 700 mL / NET: 1197.6 mL        PHYSICAL EXAM:  General: Comfortable, sedated  Neurological: sedated  HEENT: NC/AT  Cardiovascular: +S1/S2, no murmurs/rubs/gallops, RRR  Respiratory: Slightly decreased breaht sounds on L >R, no wheezing   Gastrointestinal: soft, NT/ND; active BSx4 quadrants  Genitourinary: no suprapubic tenderness, no CVA tenderness  Extremities: no edema, clubbing or cyanosis                          14.0   12.21 )-----------( 125      ( 2024 00:15 )             41.4     04-12    140  |  105  |  34<H>  ----------------------------<  184<H>  3.9   |  20<L>  |  1.41<H>    Ca    9.2      2024 00:15  Phos  3.7     04-12  Mg     2.2     04-12    TPro  5.9<L>  /  Alb  3.6  /  TBili  0.4  /  DBili  x   /  AST  46<H>  /  ALT  16  /  AlkPhos  52  04-12    PT/INR - ( 2024 00:15 )   PT: 13.5 sec;   INR: 1.23 ratio         PTT - ( 2024 00:15 )  PTT:50.2 sec  CARDIAC MARKERS ( 2024 00:15 )  x     / x     / 338 U/L / x     / 51.8 ng/mL  CARDIAC MARKERS ( 2024 17:36 )  x     / x     / 291 U/L / x     / x      CARDIAC MARKERS ( 2024 10:30 )  x     / x     / 184 U/L / x     / 19.3 ng/mL  CARDIAC MARKERS ( 2024 07:06 )  x     / x     / 128 U/L / x     / 9.3 ng/mL        Total Cholesterol: 165  LDL: --  HDL: 54  T         CONCLUSIONS:      1. Technically difficult study with limited diagnostic information.   2. Left ventricular endocardium is not well visualized; however, the left ventricular systolic function appears grossly normal.   3. Normal right ventricular cavity size and probably normal systolic function.    ________________________________________________________________________________________  FINDINGS:     Left Ventricle:  There is normal LV mass and concentric remodeling. Left ventricular endocardium is not well visualized; however, the left ventricular systolic function appears grossly normal.     Right Ventricle:  The right ventricular cavity is normal in size and probably normal systolic function.     Left Atrium:  The left atrium was not well visualized, and the LA volume could not be calculated.     Right Atrium:  The right atrium is normal in size with an indexed volume of 14.05 ml/m².  ____________________________________________________________________  QUANTITATIVE DATA:  Left Ventricle Measurements: (Indexed to BSA)     IVSd (2D):   0.9 cm  LVPWd (2D):  0.9 cm  LVIDd (2D):  2.9 cm  LVIDs (2D):  1.9 cm  LV Mass:     67 g   34.8 g/m²       Right Ventricle Measurements: Right Atrial Measurements:     RV Base (RVID1): 3.6 cm       RA Vol:       26.96 ml  RV Mid (RVID2):  3.5 cm       RA Vol Index: 14.05 ml/m²    ________________________________________________________________________________________  Electronically signed on 2024 at 10:07:59 AM by Bradley Alarcon MD       Patient seen and examined at bedside.    Overnight Events: Intubated/sedated. Had stat head CT overnight given c/f fixed pupils, unchanged.    Telemetry:    [ ] Unable to assess ROS due to intubated/sedated.    Current Meds:  albuterol/ipratropium for Nebulization 3 milliLiter(s) Nebulizer every 6 hours  aspirin enteric coated 81 milliGRAM(s) Oral daily  atorvastatin 80 milliGRAM(s) Oral every 24 hours  azithromycin  IVPB 500 milliGRAM(s) IV Intermittent every 24 hours  azithromycin  IVPB      cefTRIAXone   IVPB 2000 milliGRAM(s) IV Intermittent every 24 hours  chlorhexidine 0.12% Liquid 15 milliLiter(s) Oral Mucosa every 12 hours  chlorhexidine 4% Liquid 1 Application(s) Topical <User Schedule>  clopidogrel Tablet 75 milliGRAM(s) Enteral Tube daily  dexMEDEtomidine Infusion 0.5 MICROgram(s)/kG/Hr IV Continuous <Continuous>  fentaNYL    Injectable 25 MICROGram(s) IV Push every 3 hours PRN  heparin  Infusion 700 Unit(s)/Hr IV Continuous <Continuous>  norepinephrine Infusion 0.05 MICROgram(s)/kG/Min IV Continuous <Continuous>  pantoprazole  Injectable 40 milliGRAM(s) IV Push daily  polyethylene glycol 3350 17 Gram(s) Oral daily  propofol Infusion 20.53 MICROgram(s)/kG/Min IV Continuous <Continuous>  senna Syrup 10 milliLiter(s) Oral at bedtime  sodium chloride 3%  Inhalation 4 milliLiter(s) Inhalation every 6 hours      PAST MEDICAL & SURGICAL HISTORY:  Hypertension          Vitals:  T(F): 98.6 (-), Max: 100 ()  HR: 80 () (55 - 88)  BP: 97/64 () (88/65 - 97/64)  RR: 20 ()  SpO2: 96% ()  I&O's Summary    2024 07:01  -  2024 07:00  --------------------------------------------------------  IN: 1897.6 mL / OUT: 700 mL / NET: 1197.6 mL        PHYSICAL EXAM:  General: Comfortable, sedated  Neurological: sedated  HEENT: NC/AT  Cardiovascular: +S1/S2, no murmurs/rubs/gallops, RRR  Respiratory: Slightly decreased breaht sounds on L >R, no wheezing   Gastrointestinal: soft, NT/ND; active BSx4 quadrants  Genitourinary: no suprapubic tenderness, no CVA tenderness  Extremities: no edema, clubbing or cyanosis                          14.0   12.21 )-----------( 125      ( 2024 00:15 )             41.4     04-12    140  |  105  |  34<H>  ----------------------------<  184<H>  3.9   |  20<L>  |  1.41<H>    Ca    9.2      2024 00:15  Phos  3.7     04-12  Mg     2.2     04-12    TPro  5.9<L>  /  Alb  3.6  /  TBili  0.4  /  DBili  x   /  AST  46<H>  /  ALT  16  /  AlkPhos  52  04-12    PT/INR - ( 2024 00:15 )   PT: 13.5 sec;   INR: 1.23 ratio         PTT - ( 2024 00:15 )  PTT:50.2 sec  CARDIAC MARKERS ( 2024 00:15 )  x     / x     / 338 U/L / x     / 51.8 ng/mL  CARDIAC MARKERS ( 2024 17:36 )  x     / x     / 291 U/L / x     / x      CARDIAC MARKERS ( 2024 10:30 )  x     / x     / 184 U/L / x     / 19.3 ng/mL  CARDIAC MARKERS ( 2024 07:06 )  x     / x     / 128 U/L / x     / 9.3 ng/mL        Total Cholesterol: 165  LDL: --  HDL: 54  T         CONCLUSIONS:      1. Technically difficult study with limited diagnostic information.   2. Left ventricular endocardium is not well visualized; however, the left ventricular systolic function appears grossly normal.   3. Normal right ventricular cavity size and probably normal systolic function.    ________________________________________________________________________________________  FINDINGS:     Left Ventricle:  There is normal LV mass and concentric remodeling. Left ventricular endocardium is not well visualized; however, the left ventricular systolic function appears grossly normal.     Right Ventricle:  The right ventricular cavity is normal in size and probably normal systolic function.     Left Atrium:  The left atrium was not well visualized, and the LA volume could not be calculated.     Right Atrium:  The right atrium is normal in size with an indexed volume of 14.05 ml/m².  ____________________________________________________________________  QUANTITATIVE DATA:  Left Ventricle Measurements: (Indexed to BSA)     IVSd (2D):   0.9 cm  LVPWd (2D):  0.9 cm  LVIDd (2D):  2.9 cm  LVIDs (2D):  1.9 cm  LV Mass:     67 g   34.8 g/m²       Right Ventricle Measurements: Right Atrial Measurements:     RV Base (RVID1): 3.6 cm       RA Vol:       26.96 ml  RV Mid (RVID2):  3.5 cm       RA Vol Index: 14.05 ml/m²    ________________________________________________________________________________________  Electronically signed on 2024 at 10:07:59 AM by Bradley Alarcon MD

## 2024-04-12 NOTE — DIETITIAN INITIAL EVALUATION ADULT - NS FNS DIET ORDER
Diet, NPO with Tube Feed:   Tube Feeding Modality: Orogastric  Jevity 1.2 Bruce (JEVITY1.2RTH)  Total Volume for 24 Hours (mL): 360  Intermittent  Starting Tube Feed Rate {mL per Hour}: 20  Until Goal Tube Feed Rate (mL per Hour): 20  Tube Feeding Hours ON: 18  Tube Feeding OFF (Hours): 6  Tube Feed Start Time: 11:00 (04-11-24 @ 09:58)

## 2024-04-12 NOTE — PROGRESS NOTE ADULT - ATTENDING COMMENTS
89 yo man with history of HTN and BPPV who presented with diarrhea and SOB. Found to be in AHRF requiring intubation. Course complicated by hypotension with EKG showing MARY in inferolateral leads concerning for ACS. Cardiology consulted. EKG done showed MARY in leads v2-v6 and submm MARY in II, III, aVF. Repeat EKGs showed resolution of his MARY.   STAT TTE was done which was technically difficult, but subcostal views showed preserved LV EF.     Initial presentation consistent with sepsis and AHRF from pneumonia. CT shows extensive pneumonia with colitis as well. I presume his transient hypotension is what led to the MARY on EKG. He likely has underlying ischemic disease given his age, but I do not think his presentation is primary ACS. Given that his pressor requirements are resolving, his LVEF on TTE is wnl, and his STEs are resolving I would prioritize treating his infection.     Would trend the troponins and obtain an official echo with echocontrast

## 2024-04-12 NOTE — PROGRESS NOTE ADULT - ASSESSMENT
89 yo man with history of HTN and BPPV who presented with diarrhea and SOB. Found to be in AHRF requiring intubation. Course complicated by hypotension with EKG showing MARY in inferolateral leads concerning for ACS. Cardiology consulted.     EKG: EKG done showed MARY in leads v2-v6 and submm MARY in II, III, aVF. Repeat EKGs showed resolution of his MARY.   STAT TTE was done which was technically difficult, but subcostal views showed preserved LV EF.     Initial presentation consistent with sepsis and AHRF from pneumonia. CT shows extensive pneumonia with colitis as well. I presume his transient hypotension is what led to the MARY on EKG. He likely has underlying ischemic disease given his age, but I do not think his presentation is primary ACS. Given that his pressor requirements are resolving, his LVEF on TTE is wnl, and his STEs are resolving I would prioritize treating his infection.     Recommendations:  -monitor on telemetry   -s/p load with ASA 324mg, plavix 600mg, heparin bolus + gtt   -continue heparin gtt x 48h for ACS protocol  -c/w atorvastatin 80mg daily   -would repeat limited TTE w/ UEA to better visualize LV function   -treatment of active infection   -can revisit ischemic eval once clinically stable and infection improves     Please see attending attestation for final recommendations.     Adam Laird MD  Cardiology Fellow  89 yo man with history of HTN and BPPV who presented with diarrhea and SOB. Found to be in AHRF requiring intubation. Course complicated by hypotension with EKG showing MARY in inferolateral leads concerning for ACS. Cardiology consulted.     EKG: EKG done showed MARY in leads v2-v6 and submm MARY in II, III, aVF. Repeat EKGs showed resolution of his MARY.   STAT TTE was done which was technically difficult, but subcostal views showed preserved LV EF.     Initial presentation consistent with sepsis and AHRF from pneumonia. CT shows extensive pneumonia with colitis as well. I presume his transient hypotension is what led to the MARY on EKG. He likely has underlying ischemic disease given his age, but I do not think his presentation is primary ACS. Given that his pressor requirements are resolving, his LVEF on TTE is wnl, and his STEs are resolving I would prioritize treating his infection.     Recommendations:  -monitor on telemetry   -trend hsTnT and CKMB to peak  -s/p load with ASA 324mg, plavix 600mg, heparin bolus + gtt   -continue heparin gtt x 48h for ACS protocol  -c/w atorvastatin 80mg daily   -would repeat limited TTE w/ UEA to better visualize LV function   -treatment of active infection per ICU  -can revisit ischemic eval once clinically stable and infection improves     Please see attending attestation for final recommendations.

## 2024-04-12 NOTE — DIETITIAN INITIAL EVALUATION ADULT - OTHER INFO
Wt Hx:   Dosing wt 72.9 kG/160.7 lbs. Daily wt in lbs 169.5 (4/11)  UBW unknown.    Ht: 70 inches    IBW: 166 lbs    IBW%: 97%  Wt Hx per HIE (lbs): 168 (9/2/22), 164 (4/11/24)    Nutrition-Related Concerns:   - Intubated 4/11. On Precedex and propofol at current rate 6.6 mL/hr (174 kcals/day)  - Gastroenteritis. GI PCR positive E. Coli   - HERBERT   - Pressors: norepinephrine at 0.02 micrograms/kG/min

## 2024-04-12 NOTE — DIETITIAN INITIAL EVALUATION ADULT - REASON INDICATOR FOR ASSESSMENT
Consult for MST score 2 or >  Source: Medical record, RN, and Interdisciplinary Rounds (pt intubated and sedated)

## 2024-04-12 NOTE — PROGRESS NOTE ADULT - CRITICAL CARE ATTENDING COMMENT
Pt is a 89 yo WM with h/o HTN and benign postural vertigo presented 2 to diffuse watery diarrhea x 1 day duration. Diarrheal episodes and non-bloody, non-bilious emesis started after having vegetable soup and turkey sandwich for dinner. Pt reports multiple episodes of loose watery diarrhea, unable to control, and had an episode of watery stool in ER. Pt was noted to be hypoxic with crackles and wheezing, hypoxic to 80% on room air, requiring non-rebreather 10L, Pt subsequently continued to be hypoxic while on NRB, and subsequently intubated. Pt sent for CT (1) Scattered bilateral groundglass opacities and left lower lobe consolidation, concerning for pneumonia 2) Debris within the bilateral lower lobe bronchi and obliteration of the left lower lobe bronchus 3) No bowel obstruction. Colitis involving the ascending, transverse and proximal descending colon 4) Partially calcified nodule along the posterior gastric wall, concerning for neoplasm. Endoscopy suggested for further evaluation.5) Left adrenal 9 mm hypodense nodule. Consider nonemergent dedicated adrenal CT or MRI for further evaluation)  and then became hypotensive (felt to be 2 to Propofol); Rx'd with Epi. Noted to have ST elevation on monitor and EKG which alleged improved with improvement of BP. ICU dx: 1) AHRF requiring intubation 2 to PNA 2) Septic shock 2 to PNA + Colitis 3) R/o STEMI 4) HERBERT 2 to ATN. Trops have increased      Resp: FiO2 decreased to 30% and decrease PEEP to 5/ SBT  ID: Stool with enteropathogenic E. coli; for now cont current Abx: Ceftriaxone + Zithromax  CVS: Levophed to maintain MAP >65/ Cardio f/u noted: Heparin drip x 48 hrs and possible ischemic evaluation if pt clinically improves/ Cont Heparin + Asa + Plavix and Statin/ If Levophed is weaned off and BP stable start BB  Heme: Adjust Heparin drip to PTT  FEN: Cont enteral feeds  Endo: Follow Glu  Neuro: Decrease sedation

## 2024-04-12 NOTE — DIETITIAN INITIAL EVALUATION ADULT - PERTINENT LABORATORY DATA
04-12    140  |  105  |  34<H>  ----------------------------<  184<H>  3.9   |  20<L>  |  1.41<H>    Ca    9.2      12 Apr 2024 00:15  Phos  3.7     04-12  Mg     2.2     04-12    TPro  5.9<L>  /  Alb  3.6  /  TBili  0.4  /  DBili  x   /  AST  46<H>  /  ALT  16  /  AlkPhos  52  04-12  A1C with Estimated Average Glucose Result: 5.5 % (04-11-24 @ 10:30)

## 2024-04-12 NOTE — PROGRESS NOTE ADULT - SUBJECTIVE AND OBJECTIVE BOX
Patient is a 89y old  Male who presents with a chief complaint of Hypoxia requiring intubation (11 Apr 2024 07:43)      INTERVAL HPI/OVERNIGHT EVENTS:   No overnight events   Afebrile, hemodynamically stable     ICU Vital Signs Last 24 Hrs  T(C): 37 (12 Apr 2024 04:00), Max: 37.8 (11 Apr 2024 20:00)  T(F): 98.6 (12 Apr 2024 04:00), Max: 100 (11 Apr 2024 20:00)  HR: 82 (12 Apr 2024 06:45) (55 - 88)  BP: 97/64 (11 Apr 2024 08:36) (88/65 - 138/79)  BP(mean): 75 (11 Apr 2024 08:36) (71 - 85)  ABP: 117/52 (12 Apr 2024 06:45) (92/49 - 155/66)  ABP(mean): 74 (12 Apr 2024 06:45) (63 - 97)  RR: 20 (12 Apr 2024 06:45) (18 - 242)  SpO2: 93% (12 Apr 2024 06:45) (92% - 100%)    O2 Parameters below as of 11 Apr 2024 23:38  Patient On (Oxygen Delivery Method): ventilator          I&O's Summary    11 Apr 2024 07:01  -  12 Apr 2024 06:58  --------------------------------------------------------  IN: 1861.8 mL / OUT: 665 mL / NET: 1196.8 mL          LABS:                        14.0   12.21 )-----------( 125      ( 12 Apr 2024 00:15 )             41.4     04-12    140  |  105  |  34<H>  ----------------------------<  184<H>  3.9   |  20<L>  |  1.41<H>    Ca    9.2      12 Apr 2024 00:15  Phos  3.7     04-12  Mg     2.2     04-12    TPro  5.9<L>  /  Alb  3.6  /  TBili  0.4  /  DBili  x   /  AST  46<H>  /  ALT  16  /  AlkPhos  52  04-12    PT/INR - ( 12 Apr 2024 00:15 )   PT: 13.5 sec;   INR: 1.23 ratio         PTT - ( 12 Apr 2024 00:15 )  PTT:50.2 sec  Urinalysis Basic - ( 12 Apr 2024 00:15 )    Color: x / Appearance: x / SG: x / pH: x  Gluc: 184 mg/dL / Ketone: x  / Bili: x / Urobili: x   Blood: x / Protein: x / Nitrite: x   Leuk Esterase: x / RBC: x / WBC x   Sq Epi: x / Non Sq Epi: x / Bacteria: x      CAPILLARY BLOOD GLUCOSE        ABG - ( 12 Apr 2024 00:06 )  pH, Arterial: 7.38  pH, Blood: x     /  pCO2: 33    /  pO2: 115   / HCO3: 20    / Base Excess: -4.7  /  SaO2: 99.5                RADIOLOGY & ADDITIONAL TESTS:    Consultant(s) Notes Reviewed:  [x ] YES  [ ] NO    MEDICATIONS  (STANDING):  albuterol/ipratropium for Nebulization 3 milliLiter(s) Nebulizer every 6 hours  aspirin enteric coated 81 milliGRAM(s) Oral daily  atorvastatin 80 milliGRAM(s) Oral every 24 hours  azithromycin  IVPB 500 milliGRAM(s) IV Intermittent every 24 hours  azithromycin  IVPB      cefTRIAXone   IVPB 2000 milliGRAM(s) IV Intermittent every 24 hours  chlorhexidine 0.12% Liquid 15 milliLiter(s) Oral Mucosa every 12 hours  chlorhexidine 4% Liquid 1 Application(s) Topical <User Schedule>  clopidogrel Tablet 75 milliGRAM(s) Enteral Tube daily  dexMEDEtomidine Infusion 0.5 MICROgram(s)/kG/Hr (9.11 mL/Hr) IV Continuous <Continuous>  heparin  Infusion 700 Unit(s)/Hr (7 mL/Hr) IV Continuous <Continuous>  norepinephrine Infusion 0.05 MICROgram(s)/kG/Min (7.01 mL/Hr) IV Continuous <Continuous>  pantoprazole  Injectable 40 milliGRAM(s) IV Push daily  polyethylene glycol 3350 17 Gram(s) Oral daily  propofol Infusion 20.53 MICROgram(s)/kG/Min (8.98 mL/Hr) IV Continuous <Continuous>  senna Syrup 10 milliLiter(s) Oral at bedtime  sodium chloride 3%  Inhalation 4 milliLiter(s) Inhalation every 6 hours    MEDICATIONS  (PRN):  fentaNYL    Injectable 25 MICROGram(s) IV Push every 3 hours PRN Moderate Pain (4 - 6)      PHYSICAL EXAM:  General: Comfortable, sedated  Neurological: sedated  HEENT: NC/AT; EOMI, clear conjunctiva, no nasal or oropharyngeal discharge or exudates  Cardiovascular: +S1/S2, no murmurs/rubs/gallops, RRR  Respiratory: CTA B/L, no diminished breath sounds, no wheezes/rales/rhonchi, no increased work of breathing or accessory muscle use  Gastrointestinal: soft, NT/ND; active BSx4 quadrants  Genitourinary: no suprapubic tenderness, no CVA tenderness  Extremities: no edema, clubbing or cyanosis  Vascular: 2+ radial, DP/PT pulses B/L  Skin: no rashes  Lines/Drains:     Care Discussed with Consultants/Other Providers [ x] YES  [ ] NO Patient is a 89y old  Male who presents with a chief complaint of Hypoxia requiring intubation.      INTERVAL HPI/OVERNIGHT EVENTS:   Required a push of fentanyl x1 ovn for restlessness.     ICU Vital Signs Last 24 Hrs  T(C): 37 (12 Apr 2024 04:00), Max: 37.8 (11 Apr 2024 20:00)  T(F): 98.6 (12 Apr 2024 04:00), Max: 100 (11 Apr 2024 20:00)  HR: 82 (12 Apr 2024 06:45) (55 - 88)  BP: 97/64 (11 Apr 2024 08:36) (88/65 - 138/79)  BP(mean): 75 (11 Apr 2024 08:36) (71 - 85)  ABP: 117/52 (12 Apr 2024 06:45) (92/49 - 155/66)  ABP(mean): 74 (12 Apr 2024 06:45) (63 - 97)  RR: 20 (12 Apr 2024 06:45) (18 - 242)  SpO2: 93% (12 Apr 2024 06:45) (92% - 100%)    O2 Parameters below as of 11 Apr 2024 23:38  Patient On (Oxygen Delivery Method): ventilator          I&O's Summary    11 Apr 2024 07:01  -  12 Apr 2024 06:58  --------------------------------------------------------  IN: 1861.8 mL / OUT: 665 mL / NET: 1196.8 mL          LABS:                        14.0   12.21 )-----------( 125      ( 12 Apr 2024 00:15 )             41.4     04-12    140  |  105  |  34<H>  ----------------------------<  184<H>  3.9   |  20<L>  |  1.41<H>    Ca    9.2      12 Apr 2024 00:15  Phos  3.7     04-12  Mg     2.2     04-12    TPro  5.9<L>  /  Alb  3.6  /  TBili  0.4  /  DBili  x   /  AST  46<H>  /  ALT  16  /  AlkPhos  52  04-12    PT/INR - ( 12 Apr 2024 00:15 )   PT: 13.5 sec;   INR: 1.23 ratio         PTT - ( 12 Apr 2024 00:15 )  PTT:50.2 sec  Urinalysis Basic - ( 12 Apr 2024 00:15 )    Color: x / Appearance: x / SG: x / pH: x  Gluc: 184 mg/dL / Ketone: x  / Bili: x / Urobili: x   Blood: x / Protein: x / Nitrite: x   Leuk Esterase: x / RBC: x / WBC x   Sq Epi: x / Non Sq Epi: x / Bacteria: x      CAPILLARY BLOOD GLUCOSE        ABG - ( 12 Apr 2024 00:06 )  pH, Arterial: 7.38  pH, Blood: x     /  pCO2: 33    /  pO2: 115   / HCO3: 20    / Base Excess: -4.7  /  SaO2: 99.5                RADIOLOGY & ADDITIONAL TESTS:    Consultant(s) Notes Reviewed:  [x ] YES  [ ] NO    MEDICATIONS  (STANDING):  albuterol/ipratropium for Nebulization 3 milliLiter(s) Nebulizer every 6 hours  aspirin enteric coated 81 milliGRAM(s) Oral daily  atorvastatin 80 milliGRAM(s) Oral every 24 hours  azithromycin  IVPB 500 milliGRAM(s) IV Intermittent every 24 hours  azithromycin  IVPB      cefTRIAXone   IVPB 2000 milliGRAM(s) IV Intermittent every 24 hours  chlorhexidine 0.12% Liquid 15 milliLiter(s) Oral Mucosa every 12 hours  chlorhexidine 4% Liquid 1 Application(s) Topical <User Schedule>  clopidogrel Tablet 75 milliGRAM(s) Enteral Tube daily  dexMEDEtomidine Infusion 0.5 MICROgram(s)/kG/Hr (9.11 mL/Hr) IV Continuous <Continuous>  heparin  Infusion 700 Unit(s)/Hr (7 mL/Hr) IV Continuous <Continuous>  norepinephrine Infusion 0.05 MICROgram(s)/kG/Min (7.01 mL/Hr) IV Continuous <Continuous>  pantoprazole  Injectable 40 milliGRAM(s) IV Push daily  polyethylene glycol 3350 17 Gram(s) Oral daily  propofol Infusion 20.53 MICROgram(s)/kG/Min (8.98 mL/Hr) IV Continuous <Continuous>  senna Syrup 10 milliLiter(s) Oral at bedtime  sodium chloride 3%  Inhalation 4 milliLiter(s) Inhalation every 6 hours    MEDICATIONS  (PRN):  fentaNYL    Injectable 25 MICROGram(s) IV Push every 3 hours PRN Moderate Pain (4 - 6)      PHYSICAL EXAM:  General: Comfortable, sedated  Neurological: sedated  HEENT: NC/AT  Cardiovascular: +S1/S2, no murmurs/rubs/gallops, RRR  Respiratory: Slightly decreased breaht sounds on L >R, no wheezing   Gastrointestinal: soft, NT/ND; active BSx4 quadrants  Genitourinary: no suprapubic tenderness, no CVA tenderness  Extremities: no edema, clubbing or cyanosis      Care Discussed with Consultants/Other Providers [ x] YES  [ ] NO

## 2024-04-12 NOTE — CHART NOTE - NSCHARTNOTEFT_GEN_A_CORE
: ALEXANDRA Morse MD    INDICATION: Shock    PROCEDURE:  [x] LIMITED ECHO  [x] LIMITED CHEST  [ ] LIMITED RETROPERITONEAL  [ ] OTHER    FINDINGS:  Technically challenging study, views obtained from subcostal salvage windows.  No pericardial effusion.  Grossly normal LV systolic function.  Grossly normal RV systolic function and size.  Left lung base consolidation with small associated pleural effusion.      INTERPRETATION:  Normal GDE.  Left lung base consolidation with small associated pleural effusion.    -Alen Morse M.D.   PGY-5 EM/IM/CC  Pager #[phone]

## 2024-04-12 NOTE — DIETITIAN INITIAL EVALUATION ADULT - ORAL INTAKE PTA/DIET HISTORY
Unknown how pt was eating PTA (noted with ~1 day N/V PTA per H&P). Unknown if pt followed therapeutic diet. Unknown if pt with chewing/swallowing issues. Unknown if pt took oral nutrition supplements or micronutrients. NKFA.  Unknown how pt was eating PTA (noted with ~1 day emesis PTA per H&P). Unknown if pt followed therapeutic diet. Unknown if pt with chewing/swallowing issues. Unknown if pt took oral nutrition supplements or micronutrients. NKFA.

## 2024-04-12 NOTE — PROGRESS NOTE ADULT - ASSESSMENT
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     Neuro:   - AOx3 at baseline   - Pt currently intubated and sedated   - no other active issues     Cardiovascular:   #HTN  - patient has history of hypertension   - on lisinopril and amlodipine at home   - can hold in setting of diarrheal episodes/hypovolemia  - no history of heart failure, CAD, renal disease    Respiratory:   #Acute hypoxic respiratory failure  - on presentation, pt hypoxic to 80%, NRB with no improvement  - denies any recent coughs, fevers, denies any allergies to food/drugs  - CXR showed clear lungs   - vbg pH 7.14, pCO2 73, lactate 2.8    - no history of heart failure or CAD   - consider possible allergic rxn/anaphylaxis versus aspiration given emesis episodes  - currently intubated  - check abg, monitor respiratory status, repeat CXR, obtain RVP   - CT chest     Gastrointestinal:   #Gastroenteritis  - diarrheal episodes x 1 day duration after dinner  - afebrile, no leukocytosis, tachycardic to 100-120s bpm   - Obtain GI PCR, CT abdomen/pelvis   - NPO for now     Renal/:   #HERBERT   - Scr 1.48   - unknown baseline   - possible in setting of hypovolemia   - will continue to monitor     ID:   #gastroenteritis  - non-bloody diarrheal and emesis episodes  - afebrile, no leukocytosis   - GI PCR, CT abdomen/pelvis, CT chest     Endo:   - No active issues    Heme:   - No active issues    DVT PPx:  - heparin sq     Ethics:  - Full code   Daughter: Ronda Chu ([facility]- no number in chart, pt does not recall number/pt does not have his phone)        89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     Neuro:   - AOx3 at baseline   - Pt currently intubated and sedated   - no other active issues     Cardiovascular:   #HTN  - patient has history of hypertension   - on lisinopril and amlodipine at home   - can hold in setting of diarrheal episodes/hypovolemia  - no history of heart failure, CAD, renal disease  - on levo - likely sedation related     Respiratory:   #Acute hypoxic respiratory failure  - on presentation, pt hypoxic to 80%, NRB with no improvement  - no history of heart failure or CAD   - CT chest suggestive of L sided PNA  - f/u legionella, strep (negative), RVP (negative)  - c/w CTX, azithromycin  4/11 -     Gastrointestinal:   #Gastroenteritis  - diarrheal episodes x 1 day duration after dinner  - CTAP consistent with colitis, GI PCR positive for EPEC - no indication for antibiotic treatment for that, but getting cross coverage with antibiotics for PNA     Renal/:   #HERBERT   - Scr 1.48   - unknown baseline, but appears to be leveling off around 1.4  - possible in setting of hypovolemia   - will continue to monitor     ID:   #PNA, colitis (EPEC positive)  - f/u BCx, UCx  - on CTX, azithromycin 4/11 -     Endo:   - No active issues    Heme:   - No active issues    DVT PPx:  - heparin sq     Ethics:  - Full code   Daughter: Ronda Chu ([facility]- no number in chart, pt does not recall number/pt does not have his phone)        89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     Neuro:   - AOx3 at baseline   - Pt currently intubated and sedated   - no other active issues     Cardiovascular:   #STEMI:  - troponin on admission 30 --> 794 currently s/p acute drop in BP in the ED  - cardiology following: suspect more likely demand from acute BP drop and given infectious state, not being offered cath at this point  - trend troponin to peak  - repeat EKG then f/u cards recs     #HTN  - patient has history of hypertension   - on lisinopril and amlodipine at home   - can hold in setting of diarrheal episodes/hypovolemia  - no history of heart failure, CAD, renal disease  - on levo - likely sedation related     Respiratory:   #Acute hypoxic respiratory failure  - on presentation, pt hypoxic to 80%, NRB with no improvement  - no history of heart failure or CAD   - CT chest suggestive of L sided PNA  - f/u legionella, strep (negative), RVP (negative)  - c/w CTX, azithromycin  4/11 -     Gastrointestinal:   #Gastroenteritis  - diarrheal episodes x 1 day duration after dinner  - CTAP consistent with colitis, GI PCR positive for EPEC - no indication for antibiotic treatment for that, but getting cross coverage with antibiotics for PNA     Renal/:   #HERBERT   - Scr 1.48   - unknown baseline, but appears to be leveling off around 1.4  - possible in setting of hypovolemia   - will continue to monitor     ID:   #PNA, colitis (EPEC positive)  - f/u BCx, UCx  - on CTX, azithromycin 4/11 -     Endo:   - No active issues    Heme:   - No active issues    DVT PPx:  - heparin sq     Ethics:  - Full code   Daughter: Ronda Chu ([facility]- no number in chart, pt does not recall number/pt does not have his phone)        89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     Cardiovascular:   #STEMI:  - troponin on admission 30 --> 794 currently s/p acute drop in BP in the ED  - cardiology following: suspect more likely demand from acute BP drop and given infectious state, not being offered cath at this point  - trend troponin to peak  - repeat EKG then f/u cards recs     #HTN  - patient has history of hypertension   - on lisinopril and amlodipine at home   - can hold in setting of diarrheal episodes/hypovolemia  - no history of heart failure, CAD, renal disease  - on levo - likely sedation related     Respiratory:   #Acute hypoxic respiratory failure  - on presentation, pt hypoxic to 80%, NRB with no improvement  - no history of heart failure or CAD   - CT chest suggestive of L sided PNA  - f/u legionella, strep (negative), RVP (negative)  - c/w CTX, azithromycin  4/11 -     Gastrointestinal:   #Gastroenteritis  - diarrheal episodes x 1 day duration after dinner  - CTAP consistent with colitis, GI PCR positive for EPEC - no indication for antibiotic treatment for that, but getting cross coverage with antibiotics for PNA     Renal/:   #HERBERT   - Scr 1.48   - unknown baseline, but appears to be leveling off around 1.4  - possible in setting of hypovolemia   - will continue to monitor     ID:   #PNA, colitis (EPEC positive)  - f/u BCx, UCx  - on CTX, azithromycin 4/11 -     Endo:   - No active issues    Heme:   - No active issues    DVT PPx:  - heparin sq     Ethics:  - Full code   Daughter: Ronda Chu ([facility]- no number in chart, pt does not recall number/pt does not have his phone)

## 2024-04-12 NOTE — DIETITIAN INITIAL EVALUATION ADULT - OTHER CALCULATIONS
Fluid needs deferred to provider. The [employer] State Equation ([employer]) 2003b was used to calculate resting energy expenditure: 1790 kcals

## 2024-04-12 NOTE — DIETITIAN INITIAL EVALUATION ADULT - ENERGY INTAKE
Ordered for Jevity 1.2 at goal rate 20 mL/hr x18 hrs. Current rate: held for 18 hr feeds, was at 20 mL/hr

## 2024-04-13 LAB
ALBUMIN SERPL ELPH-MCNC: 3.1 G/DL — LOW (ref 3.3–5)
ALBUMIN SERPL ELPH-MCNC: 3.1 G/DL — LOW (ref 3.3–5)
ALP SERPL-CCNC: 50 U/L — SIGNIFICANT CHANGE UP (ref 40–120)
ALP SERPL-CCNC: 52 U/L — SIGNIFICANT CHANGE UP (ref 40–120)
ALT FLD-CCNC: 17 U/L — SIGNIFICANT CHANGE UP (ref 10–45)
ALT FLD-CCNC: 17 U/L — SIGNIFICANT CHANGE UP (ref 10–45)
ANION GAP SERPL CALC-SCNC: 12 MMOL/L — SIGNIFICANT CHANGE UP (ref 5–17)
ANION GAP SERPL CALC-SCNC: 12 MMOL/L — SIGNIFICANT CHANGE UP (ref 5–17)
APTT BLD: 46.5 SEC — HIGH (ref 24.5–35.6)
APTT BLD: 48.5 SEC — HIGH (ref 24.5–35.6)
APTT BLD: 54.6 SEC — HIGH (ref 24.5–35.6)
AST SERPL-CCNC: 27 U/L — SIGNIFICANT CHANGE UP (ref 10–40)
AST SERPL-CCNC: 38 U/L — SIGNIFICANT CHANGE UP (ref 10–40)
BASE EXCESS BLDV CALC-SCNC: 0.8 MMOL/L — SIGNIFICANT CHANGE UP (ref -2–3)
BASOPHILS # BLD AUTO: 0.01 K/UL — SIGNIFICANT CHANGE UP (ref 0–0.2)
BASOPHILS # BLD AUTO: 0.04 K/UL — SIGNIFICANT CHANGE UP (ref 0–0.2)
BASOPHILS NFR BLD AUTO: 0.1 % — SIGNIFICANT CHANGE UP (ref 0–2)
BASOPHILS NFR BLD AUTO: 0.5 % — SIGNIFICANT CHANGE UP (ref 0–2)
BILIRUB SERPL-MCNC: 0.5 MG/DL — SIGNIFICANT CHANGE UP (ref 0.2–1.2)
BILIRUB SERPL-MCNC: 0.5 MG/DL — SIGNIFICANT CHANGE UP (ref 0.2–1.2)
BUN SERPL-MCNC: 31 MG/DL — HIGH (ref 7–23)
BUN SERPL-MCNC: 32 MG/DL — HIGH (ref 7–23)
CA-I SERPL-SCNC: 1.31 MMOL/L — SIGNIFICANT CHANGE UP (ref 1.15–1.33)
CALCIUM SERPL-MCNC: 8.8 MG/DL — SIGNIFICANT CHANGE UP (ref 8.4–10.5)
CALCIUM SERPL-MCNC: 8.9 MG/DL — SIGNIFICANT CHANGE UP (ref 8.4–10.5)
CHLORIDE BLDV-SCNC: 107 MMOL/L — SIGNIFICANT CHANGE UP (ref 96–108)
CHLORIDE SERPL-SCNC: 105 MMOL/L — SIGNIFICANT CHANGE UP (ref 96–108)
CHLORIDE SERPL-SCNC: 107 MMOL/L — SIGNIFICANT CHANGE UP (ref 96–108)
CK MB BLD-MCNC: 15 % — HIGH (ref 0–3.5)
CK MB BLD-MCNC: 15 % — HIGH (ref 0–3.5)
CK MB CFR SERPL CALC: 15.1 NG/ML — HIGH (ref 0–6.7)
CK MB CFR SERPL CALC: 23.2 NG/ML — HIGH (ref 0–6.7)
CK MB CFR SERPL CALC: 30.2 NG/ML — HIGH (ref 0–6.7)
CK MB CFR SERPL CALC: 8.7 NG/ML — HIGH (ref 0–6.7)
CK SERPL-CCNC: 155 U/L — SIGNIFICANT CHANGE UP (ref 30–200)
CK SERPL-CCNC: 202 U/L — HIGH (ref 30–200)
CK SERPL-CCNC: 79 U/L — SIGNIFICANT CHANGE UP (ref 30–200)
CK SERPL-CCNC: 86 U/L — SIGNIFICANT CHANGE UP (ref 30–200)
CLOSURE TME COLL+EPINEP BLD: SIGNIFICANT CHANGE UP (ref 150–400)
CO2 BLDV-SCNC: 27 MMOL/L — HIGH (ref 22–26)
CO2 SERPL-SCNC: 21 MMOL/L — LOW (ref 22–31)
CO2 SERPL-SCNC: 23 MMOL/L — SIGNIFICANT CHANGE UP (ref 22–31)
CREAT SERPL-MCNC: 1.18 MG/DL — SIGNIFICANT CHANGE UP (ref 0.5–1.3)
CREAT SERPL-MCNC: 1.28 MG/DL — SIGNIFICANT CHANGE UP (ref 0.5–1.3)
EGFR: 53 ML/MIN/1.73M2 — LOW
EGFR: 58 ML/MIN/1.73M2 — LOW
EOSINOPHIL # BLD AUTO: 0.05 K/UL — SIGNIFICANT CHANGE UP (ref 0–0.5)
EOSINOPHIL # BLD AUTO: 0.05 K/UL — SIGNIFICANT CHANGE UP (ref 0–0.5)
EOSINOPHIL NFR BLD AUTO: 0.6 % — SIGNIFICANT CHANGE UP (ref 0–6)
EOSINOPHIL NFR BLD AUTO: 0.7 % — SIGNIFICANT CHANGE UP (ref 0–6)
GAS PNL BLDV: 135 MMOL/L — LOW (ref 136–145)
GAS PNL BLDV: SIGNIFICANT CHANGE UP
GAS PNL BLDV: SIGNIFICANT CHANGE UP
GLUCOSE BLDC GLUCOMTR-MCNC: 107 MG/DL — HIGH (ref 70–99)
GLUCOSE BLDC GLUCOMTR-MCNC: 141 MG/DL — HIGH (ref 70–99)
GLUCOSE BLDV-MCNC: 119 MG/DL — HIGH (ref 70–99)
GLUCOSE SERPL-MCNC: 129 MG/DL — HIGH (ref 70–99)
GLUCOSE SERPL-MCNC: 143 MG/DL — HIGH (ref 70–99)
HCO3 BLDV-SCNC: 26 MMOL/L — SIGNIFICANT CHANGE UP (ref 22–29)
HCT VFR BLD CALC: 36 % — LOW (ref 39–50)
HCT VFR BLD CALC: 37.3 % — LOW (ref 39–50)
HCT VFR BLDA CALC: 39 % — SIGNIFICANT CHANGE UP (ref 39–51)
HEPARIN-PF4 AB RESULT: <0.6 U/ML — SIGNIFICANT CHANGE UP (ref 0–0.9)
HGB BLD CALC-MCNC: 12.9 G/DL — SIGNIFICANT CHANGE UP (ref 12.6–17.4)
HGB BLD-MCNC: 12.3 G/DL — LOW (ref 13–17)
HGB BLD-MCNC: 12.3 G/DL — LOW (ref 13–17)
IMM GRANULOCYTES NFR BLD AUTO: 0.8 % — SIGNIFICANT CHANGE UP (ref 0–0.9)
IMM GRANULOCYTES NFR BLD AUTO: 1 % — HIGH (ref 0–0.9)
INR BLD: 1.23 RATIO — HIGH (ref 0.85–1.18)
LACTATE BLDV-MCNC: 1.5 MMOL/L — SIGNIFICANT CHANGE UP (ref 0.5–2)
LYMPHOCYTES # BLD AUTO: 0.6 K/UL — LOW (ref 1–3.3)
LYMPHOCYTES # BLD AUTO: 0.94 K/UL — LOW (ref 1–3.3)
LYMPHOCYTES # BLD AUTO: 12.4 % — LOW (ref 13–44)
LYMPHOCYTES # BLD AUTO: 7.6 % — LOW (ref 13–44)
MAGNESIUM SERPL-MCNC: 2.1 MG/DL — SIGNIFICANT CHANGE UP (ref 1.6–2.6)
MAGNESIUM SERPL-MCNC: 2.1 MG/DL — SIGNIFICANT CHANGE UP (ref 1.6–2.6)
MCHC RBC-ENTMCNC: 29.6 PG — SIGNIFICANT CHANGE UP (ref 27–34)
MCHC RBC-ENTMCNC: 30.6 PG — SIGNIFICANT CHANGE UP (ref 27–34)
MCHC RBC-ENTMCNC: 33 GM/DL — SIGNIFICANT CHANGE UP (ref 32–36)
MCHC RBC-ENTMCNC: 34.2 GM/DL — SIGNIFICANT CHANGE UP (ref 32–36)
MCV RBC AUTO: 89.6 FL — SIGNIFICANT CHANGE UP (ref 80–100)
MCV RBC AUTO: 89.7 FL — SIGNIFICANT CHANGE UP (ref 80–100)
MONOCYTES # BLD AUTO: 0.19 K/UL — SIGNIFICANT CHANGE UP (ref 0–0.9)
MONOCYTES # BLD AUTO: 0.24 K/UL — SIGNIFICANT CHANGE UP (ref 0–0.9)
MONOCYTES NFR BLD AUTO: 2.5 % — SIGNIFICANT CHANGE UP (ref 2–14)
MONOCYTES NFR BLD AUTO: 3.1 % — SIGNIFICANT CHANGE UP (ref 2–14)
NEUTROPHILS # BLD AUTO: 6.31 K/UL — SIGNIFICANT CHANGE UP (ref 1.8–7.4)
NEUTROPHILS # BLD AUTO: 6.87 K/UL — SIGNIFICANT CHANGE UP (ref 1.8–7.4)
NEUTROPHILS NFR BLD AUTO: 83.1 % — HIGH (ref 43–77)
NEUTROPHILS NFR BLD AUTO: 87.6 % — HIGH (ref 43–77)
NRBC # BLD: 0 /100 WBCS — SIGNIFICANT CHANGE UP (ref 0–0)
NRBC # BLD: 0 /100 WBCS — SIGNIFICANT CHANGE UP (ref 0–0)
PCO2 BLDV: 43 MMHG — SIGNIFICANT CHANGE UP (ref 42–55)
PF4 HEPARIN CMPLX AB SER-ACNC: NEGATIVE — SIGNIFICANT CHANGE UP
PH BLDV: 7.39 — SIGNIFICANT CHANGE UP (ref 7.32–7.43)
PHOSPHATE SERPL-MCNC: 1.9 MG/DL — LOW (ref 2.5–4.5)
PHOSPHATE SERPL-MCNC: 2.7 MG/DL — SIGNIFICANT CHANGE UP (ref 2.5–4.5)
PLATELET # BLD AUTO: 66 K/UL — LOW (ref 150–400)
PLATELET # BLD AUTO: 76 K/UL — LOW (ref 150–400)
PO2 BLDV: 56 MMHG — HIGH (ref 25–45)
POTASSIUM BLDV-SCNC: 3.4 MMOL/L — LOW (ref 3.5–5.1)
POTASSIUM SERPL-MCNC: 3.6 MMOL/L — SIGNIFICANT CHANGE UP (ref 3.5–5.3)
POTASSIUM SERPL-MCNC: 3.9 MMOL/L — SIGNIFICANT CHANGE UP (ref 3.5–5.3)
POTASSIUM SERPL-SCNC: 3.6 MMOL/L — SIGNIFICANT CHANGE UP (ref 3.5–5.3)
POTASSIUM SERPL-SCNC: 3.9 MMOL/L — SIGNIFICANT CHANGE UP (ref 3.5–5.3)
PROT SERPL-MCNC: 5.7 G/DL — LOW (ref 6–8.3)
PROT SERPL-MCNC: 5.9 G/DL — LOW (ref 6–8.3)
PROTHROM AB SERPL-ACNC: 12.8 SEC — SIGNIFICANT CHANGE UP (ref 9.5–13)
RBC # BLD: 4.02 M/UL — LOW (ref 4.2–5.8)
RBC # BLD: 4.16 M/UL — LOW (ref 4.2–5.8)
RBC # FLD: 13.7 % — SIGNIFICANT CHANGE UP (ref 10.3–14.5)
RBC # FLD: 13.9 % — SIGNIFICANT CHANGE UP (ref 10.3–14.5)
SAO2 % BLDV: 88.8 % — HIGH (ref 67–88)
SODIUM SERPL-SCNC: 138 MMOL/L — SIGNIFICANT CHANGE UP (ref 135–145)
SODIUM SERPL-SCNC: 142 MMOL/L — SIGNIFICANT CHANGE UP (ref 135–145)
TROPONIN T, HIGH SENSITIVITY RESULT: 1020 NG/L — HIGH (ref 0–51)
TROPONIN T, HIGH SENSITIVITY RESULT: 1280 NG/L — HIGH (ref 0–51)
TROPONIN T, HIGH SENSITIVITY RESULT: 814 NG/L — HIGH (ref 0–51)
WBC # BLD: 7.59 K/UL — SIGNIFICANT CHANGE UP (ref 3.8–10.5)
WBC # BLD: 7.85 K/UL — SIGNIFICANT CHANGE UP (ref 3.8–10.5)
WBC # FLD AUTO: 7.59 K/UL — SIGNIFICANT CHANGE UP (ref 3.8–10.5)
WBC # FLD AUTO: 7.85 K/UL — SIGNIFICANT CHANGE UP (ref 3.8–10.5)

## 2024-04-13 PROCEDURE — 99291 CRITICAL CARE FIRST HOUR: CPT

## 2024-04-13 RX ORDER — PIPERACILLIN AND TAZOBACTAM 4; .5 G/20ML; G/20ML
3.38 INJECTION, POWDER, LYOPHILIZED, FOR SOLUTION INTRAVENOUS ONCE
Refills: 0 | Status: COMPLETED | OUTPATIENT
Start: 2024-04-13 | End: 2024-04-13

## 2024-04-13 RX ORDER — PIPERACILLIN AND TAZOBACTAM 4; .5 G/20ML; G/20ML
3.38 INJECTION, POWDER, LYOPHILIZED, FOR SOLUTION INTRAVENOUS ONCE
Refills: 0 | Status: COMPLETED | OUTPATIENT
Start: 2024-04-14 | End: 2024-04-14

## 2024-04-13 RX ORDER — SODIUM CHLORIDE 9 MG/ML
1000 INJECTION, SOLUTION INTRAVENOUS
Refills: 0 | Status: DISCONTINUED | OUTPATIENT
Start: 2024-04-13 | End: 2024-04-13

## 2024-04-13 RX ORDER — ACETAMINOPHEN 500 MG
1000 TABLET ORAL ONCE
Refills: 0 | Status: COMPLETED | OUTPATIENT
Start: 2024-04-13 | End: 2024-04-13

## 2024-04-13 RX ORDER — PIPERACILLIN AND TAZOBACTAM 4; .5 G/20ML; G/20ML
3.38 INJECTION, POWDER, LYOPHILIZED, FOR SOLUTION INTRAVENOUS EVERY 8 HOURS
Refills: 0 | Status: COMPLETED | OUTPATIENT
Start: 2024-04-14 | End: 2024-04-15

## 2024-04-13 RX ORDER — METOPROLOL TARTRATE 50 MG
2.5 TABLET ORAL EVERY 12 HOURS
Refills: 0 | Status: DISCONTINUED | OUTPATIENT
Start: 2024-04-13 | End: 2024-04-14

## 2024-04-13 RX ORDER — SODIUM CHLORIDE 9 MG/ML
1000 INJECTION, SOLUTION INTRAVENOUS
Refills: 0 | Status: DISCONTINUED | OUTPATIENT
Start: 2024-04-13 | End: 2024-04-14

## 2024-04-13 RX ORDER — METOPROLOL TARTRATE 50 MG
12.5 TABLET ORAL EVERY 12 HOURS
Refills: 0 | Status: DISCONTINUED | OUTPATIENT
Start: 2024-04-13 | End: 2024-04-13

## 2024-04-13 RX ADMIN — Medication 1000 MILLIGRAM(S): at 17:17

## 2024-04-13 RX ADMIN — PIPERACILLIN AND TAZOBACTAM 200 GRAM(S): 4; .5 INJECTION, POWDER, LYOPHILIZED, FOR SOLUTION INTRAVENOUS at 14:19

## 2024-04-13 RX ADMIN — Medication 3 MILLILITER(S): at 17:10

## 2024-04-13 RX ADMIN — Medication 1000 MILLIGRAM(S): at 23:30

## 2024-04-13 RX ADMIN — AZITHROMYCIN 255 MILLIGRAM(S): 500 TABLET, FILM COATED ORAL at 08:39

## 2024-04-13 RX ADMIN — PANTOPRAZOLE SODIUM 40 MILLIGRAM(S): 20 TABLET, DELAYED RELEASE ORAL at 12:06

## 2024-04-13 RX ADMIN — Medication 3 MILLILITER(S): at 11:23

## 2024-04-13 RX ADMIN — SENNA PLUS 10 MILLILITER(S): 8.6 TABLET ORAL at 22:47

## 2024-04-13 RX ADMIN — Medication 1000 MILLIGRAM(S): at 11:38

## 2024-04-13 RX ADMIN — SODIUM CHLORIDE 100 MILLILITER(S): 9 INJECTION, SOLUTION INTRAVENOUS at 17:45

## 2024-04-13 RX ADMIN — Medication 3 MILLILITER(S): at 05:12

## 2024-04-13 RX ADMIN — CEFTRIAXONE 100 MILLIGRAM(S): 500 INJECTION, POWDER, FOR SOLUTION INTRAMUSCULAR; INTRAVENOUS at 07:31

## 2024-04-13 RX ADMIN — HEPARIN SODIUM 1150 UNIT(S)/HR: 5000 INJECTION INTRAVENOUS; SUBCUTANEOUS at 14:38

## 2024-04-13 RX ADMIN — HEPARIN SODIUM 1000 UNIT(S)/HR: 5000 INJECTION INTRAVENOUS; SUBCUTANEOUS at 08:13

## 2024-04-13 RX ADMIN — Medication 400 MILLIGRAM(S): at 10:25

## 2024-04-13 RX ADMIN — Medication 3 MILLILITER(S): at 23:02

## 2024-04-13 RX ADMIN — PIPERACILLIN AND TAZOBACTAM 25 GRAM(S): 4; .5 INJECTION, POWDER, LYOPHILIZED, FOR SOLUTION INTRAVENOUS at 17:45

## 2024-04-13 RX ADMIN — Medication 400 MILLIGRAM(S): at 22:47

## 2024-04-13 RX ADMIN — CHLORHEXIDINE GLUCONATE 1 APPLICATION(S): 213 SOLUTION TOPICAL at 05:21

## 2024-04-13 RX ADMIN — Medication 400 MILLIGRAM(S): at 16:17

## 2024-04-13 NOTE — PROGRESS NOTE ADULT - ATTENDING COMMENTS
89 yo WM with h/o HTN and benign postural vertigo presented 2 to diffuse watery diarrhea x 1 day duration. Diarrheal episodes and non-bloody, non-bilious emesis started after having vegetable soup and turkey sandwich for dinner. Pt reports multiple episodes of loose watery diarrhea, unable to control, and had an episode of watery stool in ER. Pt was noted to be hypoxic with crackles and wheezing, hypoxic to 80% on room air, requiring non-rebreather 10L, Pt subsequently continued to be hypoxic while on NRB, and subsequently intubated. Pt sent for CT (1) Scattered bilateral groundglass opacities and left lower lobe consolidation, concerning for pneumonia 2) Debris within the bilateral lower lobe bronchi and obliteration of the left lower lobe bronchus 3) No bowel obstruction. Colitis involving the ascending, transverse and proximal descending colon 4) Partially calcified nodule along the posterior gastric wall, concerning for neoplasm. Endoscopy suggested for further evaluation.5) Left adrenal 9 mm hypodense nodule. Consider nonemergent dedicated adrenal CT or MRI for further evaluation)  and then became hypotensive (felt to be 2 to Propofol); Rx'd with Epi. Noted to have ST elevation on monitor and EKG which alleged improved with improvement of BP. ICU dx: 1) AHRF requiring intubation 2 to PNA 2) Septic shock 2 to PNA + Colitis 3) STEMI 4) HERBERT 2 to ATN.       Resp:  resp status improving, doing well post extubation  cont airway clearance, chest pt and suctioning      ID: Stool with enteropathogenic E. coli; for now cont current Abx: Ceftriaxone   bandemaia noted and low grade fever, cx ordered    CVS: off Levophed,   maintain MAP >65  Cardio f/u noted: Heparin drip x 48 hrs and possible ischemic evaluation if pt clinically improves  Cont Heparin + Asa + Plavix and Statin   start BB if BP allows    Neuro  aox3, rivas appropriately  off all sedatives

## 2024-04-13 NOTE — PROGRESS NOTE ADULT - SUBJECTIVE AND OBJECTIVE BOX
INTERVAL HPI/OVERNIGHT EVENTS:  - none; extubated as of 4/12    SUBJECTIVE: Patient seen and examined at bedside.   - awake and alert  - febrile, cultured, coughing with secretions      VITAL SIGNS:  ICU Vital Signs Last 24 Hrs  T(C): 36.8 (13 Apr 2024 04:00), Max: 38 (12 Apr 2024 10:00)  T(F): 98.2 (13 Apr 2024 04:00), Max: 100.4 (12 Apr 2024 10:00)  HR: 74 (13 Apr 2024 06:00) (66 - 85)  BP: 159/75 (13 Apr 2024 06:00) (95/58 - 164/74)  BP(mean): 108 (13 Apr 2024 06:00) (71 - 108)  ABP: 98/53 (12 Apr 2024 10:30) (96/47 - 168/74)  ABP(mean): 69 (12 Apr 2024 10:30) (65 - 109)  RR: 21 (13 Apr 2024 06:00) (16 - 23)  SpO2: 100% (13 Apr 2024 06:00) (91% - 100%)    O2 Parameters below as of 13 Apr 2024 05:45  Patient On (Oxygen Delivery Method): nasal cannula          Mode: CPAP with PS, FiO2: 30, PEEP: 5, PS: 5, MAP: 8, PIP: 11  Plateau pressure:   P/F ratio:     04-12 @ 07:01  -  04-13 @ 07:00  --------------------------------------------------------  IN: 835.2 mL / OUT: 960 mL / NET: -124.8 mL      CAPILLARY BLOOD GLUCOSE      POCT Blood Glucose.: 112 mg/dL (12 Apr 2024 17:23)    ECG:    PHYSICAL EXAM:    General: NAD  HEENT: NCAT EOMI, PERRL, dry MM  Neck: supple  Respiratory: rhonchi throughout with expiratory crackles in BL lower lobes   Cardiovascular: RRR, no murmur;  Abdomen: soft, distended, +tympanic, nontender, nondistended   Extremities: no LE edema   Neurological: Aox3, no neurological deficits     MEDICATIONS:  MEDICATIONS  (STANDING):  albuterol/ipratropium for Nebulization 3 milliLiter(s) Nebulizer every 6 hours  aspirin enteric coated 81 milliGRAM(s) Oral daily  atorvastatin 80 milliGRAM(s) Oral every 24 hours  azithromycin  IVPB 500 milliGRAM(s) IV Intermittent every 24 hours  azithromycin  IVPB      cefTRIAXone   IVPB 2000 milliGRAM(s) IV Intermittent every 24 hours  chlorhexidine 4% Liquid 1 Application(s) Topical <User Schedule>  clopidogrel Tablet 75 milliGRAM(s) Enteral Tube daily  dexMEDEtomidine Infusion 0.7 MICROgram(s)/kG/Hr (12.8 mL/Hr) IV Continuous <Continuous>  heparin  Infusion. 850 Unit(s)/Hr (8.5 mL/Hr) IV Continuous <Continuous>  pantoprazole  Injectable 40 milliGRAM(s) IV Push daily  polyethylene glycol 3350 17 Gram(s) Oral daily  senna Syrup 10 milliLiter(s) Oral at bedtime    MEDICATIONS  (PRN):  heparin   Injectable 4000 Unit(s) IV Push every 6 hours PRN For aPTT less than 40      ALLERGIES:  Allergies    iodine (Other)    Intolerances        LABS:                        12.3   7.59  )-----------( 66       ( 13 Apr 2024 00:41 )             37.3     04-13    142  |  107  |  31<H>  ----------------------------<  129<H>  3.6   |  23  |  1.18    Ca    8.8      13 Apr 2024 00:41  Phos  2.7     04-13  Mg     2.1     04-13    TPro  5.7<L>  /  Alb  3.1<L>  /  TBili  0.5  /  DBili  x   /  AST  38  /  ALT  17  /  AlkPhos  50  04-13    PT/INR - ( 13 Apr 2024 00:41 )   PT: 12.8 sec;   INR: 1.23 ratio         PTT - ( 13 Apr 2024 06:59 )  PTT:46.5 sec  Urinalysis Basic - ( 13 Apr 2024 00:41 )    Color: x / Appearance: x / SG: x / pH: x  Gluc: 129 mg/dL / Ketone: x  / Bili: x / Urobili: x   Blood: x / Protein: x / Nitrite: x   Leuk Esterase: x / RBC: x / WBC x   Sq Epi: x / Non Sq Epi: x / Bacteria: x        RADIOLOGY & ADDITIONAL TESTS: Reviewed.   INTERVAL HPI/OVERNIGHT EVENTS:  - none; extubated as of 4/12    SUBJECTIVE: Patient seen and examined at bedside.   - awake and alert. He states he is doing well "as much as he can." He does recall the event where he ate the soup/turkey sandwich, didn't feel so good and called the ambulance when he had persistent n/v/d. His daughter is in [place], now visiting here.   - febrile, cultured, coughing with secretions      VITAL SIGNS:  ICU Vital Signs Last 24 Hrs  T(C): 36.8 (13 Apr 2024 04:00), Max: 38 (12 Apr 2024 10:00)  T(F): 98.2 (13 Apr 2024 04:00), Max: 100.4 (12 Apr 2024 10:00)  HR: 74 (13 Apr 2024 06:00) (66 - 85)  BP: 159/75 (13 Apr 2024 06:00) (95/58 - 164/74)  BP(mean): 108 (13 Apr 2024 06:00) (71 - 108)  ABP: 98/53 (12 Apr 2024 10:30) (96/47 - 168/74)  ABP(mean): 69 (12 Apr 2024 10:30) (65 - 109)  RR: 21 (13 Apr 2024 06:00) (16 - 23)  SpO2: 100% (13 Apr 2024 06:00) (91% - 100%)    O2 Parameters below as of 13 Apr 2024 05:45  Patient On (Oxygen Delivery Method): nasal cannula          Mode: CPAP with PS, FiO2: 30, PEEP: 5, PS: 5, MAP: 8, PIP: 11  Plateau pressure:   P/F ratio:     04-12 @ 07:01  -  04-13 @ 07:00  --------------------------------------------------------  IN: 835.2 mL / OUT: 960 mL / NET: -124.8 mL      CAPILLARY BLOOD GLUCOSE      POCT Blood Glucose.: 112 mg/dL (12 Apr 2024 17:23)    ECG:    PHYSICAL EXAM:    General: NAD  HEENT: NCAT EOMI, PERRL, dry MM  Neck: supple  Respiratory: rhonchi throughout with expiratory crackles in BL lower lobes   Cardiovascular: RRR, no murmur;  Abdomen: soft, distended, +tympanic, nontender, nondistended   Extremities: no LE edema   Neurological: Aox3, no neurological deficits     MEDICATIONS:  MEDICATIONS  (STANDING):  albuterol/ipratropium for Nebulization 3 milliLiter(s) Nebulizer every 6 hours  aspirin enteric coated 81 milliGRAM(s) Oral daily  atorvastatin 80 milliGRAM(s) Oral every 24 hours  azithromycin  IVPB 500 milliGRAM(s) IV Intermittent every 24 hours  azithromycin  IVPB      cefTRIAXone   IVPB 2000 milliGRAM(s) IV Intermittent every 24 hours  chlorhexidine 4% Liquid 1 Application(s) Topical <User Schedule>  clopidogrel Tablet 75 milliGRAM(s) Enteral Tube daily  dexMEDEtomidine Infusion 0.7 MICROgram(s)/kG/Hr (12.8 mL/Hr) IV Continuous <Continuous>  heparin  Infusion. 850 Unit(s)/Hr (8.5 mL/Hr) IV Continuous <Continuous>  pantoprazole  Injectable 40 milliGRAM(s) IV Push daily  polyethylene glycol 3350 17 Gram(s) Oral daily  senna Syrup 10 milliLiter(s) Oral at bedtime    MEDICATIONS  (PRN):  heparin   Injectable 4000 Unit(s) IV Push every 6 hours PRN For aPTT less than 40      ALLERGIES:  Allergies    iodine (Other)    Intolerances        LABS:                        12.3   7.59  )-----------( 66       ( 13 Apr 2024 00:41 )             37.3     04-13    142  |  107  |  31<H>  ----------------------------<  129<H>  3.6   |  23  |  1.18    Ca    8.8      13 Apr 2024 00:41  Phos  2.7     04-13  Mg     2.1     04-13    TPro  5.7<L>  /  Alb  3.1<L>  /  TBili  0.5  /  DBili  x   /  AST  38  /  ALT  17  /  AlkPhos  50  04-13    PT/INR - ( 13 Apr 2024 00:41 )   PT: 12.8 sec;   INR: 1.23 ratio         PTT - ( 13 Apr 2024 06:59 )  PTT:46.5 sec  Urinalysis Basic - ( 13 Apr 2024 00:41 )    Color: x / Appearance: x / SG: x / pH: x  Gluc: 129 mg/dL / Ketone: x  / Bili: x / Urobili: x   Blood: x / Protein: x / Nitrite: x   Leuk Esterase: x / RBC: x / WBC x   Sq Epi: x / Non Sq Epi: x / Bacteria: x        RADIOLOGY & ADDITIONAL TESTS: Reviewed.

## 2024-04-13 NOTE — PROGRESS NOTE ADULT - ASSESSMENT
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 Now extubated as of 4/12. 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    #HTN  - patient has history of hypertension   - on lisinopril and amlodipine at home   - diarrhea episodes resolved   - no history of heart failure, CAD, renal disease  - on levo - likely sedation related     Respiratory:   #Acute hypoxic respiratory failure  - on presentation, pt hypoxic to 80%, NRB with no improvement  - no history of heart failure or CAD   - CT chest suggestive of L sided PNA  - f/u legionella, strep (negative), RVP (negative)  - c/w CTX, azithromycin  4/11 -     Gastrointestinal:   #Gastroenteritis  - diarrheal episodes x 1 day duration after dinner  - CTAP consistent with colitis, GI PCR positive for EPEC - no indication for antibiotic treatment for that, but getting cross coverage with antibiotics for PNA     Renal/:   #HERBERT   - Scr 1.48   - unknown baseline, but appears to be leveling off around 1.4  - possible in setting of hypovolemia   - will continue to monitor     ID:   #PNA, colitis (EPEC positive)  - f/u BCx, UCx  - on CTX, azithromycin 4/11 -     Endo:   - No active issues    Heme:   - No active issues    DVT PPx:  - heparin sq     Ethics:  - Full code   Daughter: Ronda Chu ([facility]- no number in chart, pt does not recall number/pt does not have his phone)        89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 Now extubated as of 4/12. 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    #h/o HTN  #hypotension?  - patient has history of hypertension   - on lisinopril and amlodipine at home   - diarrhea episodes resolved   - no history of heart failure, CAD, renal disease  - off of levo as of 4/12  - 4/13: mild orthostatic hypotension (symptomatic) x1 episode from bed to chair  - hold ACEi, started IVF hydration 100cc x10 hr D5/LR  - could be in setting of persistent fevers and infection     Respiratory:   #Acute hypoxic respiratory failure  - on presentation, pt hypoxic to 80%, NRB with no improvement  - no history of heart failure or CAD   - CT chest suggestive of L sided PNA  - f/u legionella, strep (negative), RVP (negative)  - d/c  CTX, azithromycin  4/11 - 4/12  - started Zosyn i/s/o persistent fevers on 4/13   - chest PT, inspirometry, suctioning     Gastrointestinal:   #Gastroenteritis  - diarrheal episodes x 1 day duration after dinner  - CTAP consistent with colitis, GI PCR positive for EPEC - no indication for antibiotic treatment for that, but getting cross coverage with antibiotics for PNA     #Diet  - hoarseness this AM s/p extubation  - 4/13 improved, pending dysphagia screen, trial CLD if passed  - ordered official speech and swallow as well     #change in BM  -  acute diarrhea i/s/o EPEC, now resolved  - h/o constipation, no bm x48 hours, on miralax and senna  - consider enema if no BM      Renal/:   #HERBERT   - Scr 1.48 --> improved to 1.19  - unknown baseline,   - possible in setting of hypovolemia   - will continue to monitor   - trial mckee removal tonight, 4/13    ID:   #PNA, colitis (EPEC positive)  - BCx 4/10 NGTD x2, UCx neg 4/10  - repeat BCx 4/13 for recurrent, persistent fevers  - f/u repeat BCx/sputum cx from 4/13 ___  - on zosyn as of 4/13     Endo:   - No active issues    Heme:   - h/o of thrombocytopenia   - plt 66, confirmed on blue top + clumping   - called lab    DVT PPx:  - heparin sq     Ethics:  - Full code   Daughter: Ronda Chu ([facility]- no number in chart, pt does not recall number/pt does not have his phone)        89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 Now extubated as of 4/12. 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    #h/o HTN  #hypotension?  - patient has history of hypertension   - on lisinopril and amlodipine at home   - diarrhea episodes resolved   - no history of heart failure, CAD, renal disease  - off of levo as of 4/12  - 4/13: mild orthostatic hypotension (symptomatic) x1 episode from bed to chair  - hold ACEi, started IVF hydration 100cc x10 hr D5/LR  - could be in setting of persistent fevers and infection     Respiratory:   #Acute hypoxic respiratory failure  - on presentation, pt hypoxic to 80%, NRB with no improvement  - no history of heart failure or CAD   - CT chest suggestive of L sided PNA  - f/u legionella, strep (negative), RVP (negative)  - d/c  CTX, azithromycin  4/11 - 4/12  - started Zosyn i/s/o persistent fevers on 4/13   - chest PT, inspirometry, suctioning     Gastrointestinal:   #Gastroenteritis  - diarrheal episodes x 1 day duration after dinner  - CTAP consistent with colitis, GI PCR positive for EPEC - no indication for antibiotic treatment for that, but getting cross coverage with antibiotics for PNA     #Diet  - hoarseness this AM s/p extubation  - 4/13 improved, pending dysphagia screen, trial CLD if passed  - ordered official speech and swallow as well     #change in BM  -  acute diarrhea i/s/o EPEC, now resolved  - h/o constipation, no bm x48 hours, on miralax and senna  - consider enema if no BM    #hepatic cysts  - CT abd/pelv: Scattered hepatic cysts with largest measuring 2.4 x 3.1 cm.     #gastric nodule?  CT abd/pelv:  Small partially calcified nodule along the posterior wall of the gastric body, measuring 1.5 x 1.2 cm.  - f/u with endoscopy for neoplasm r/o?    Renal/:   #HERBERT   - Scr 1.48 --> improved to 1.19  - unknown baseline,   - possible in setting of hypovolemia   - will continue to monitor   - trial mckee removal tonight, 4/13    ID:   #PNA, colitis (EPEC positive)  - BCx 4/10 NGTD x2, UCx neg 4/10  - repeat BCx 4/13 for recurrent, persistent fevers  - f/u repeat BCx/sputum cx from 4/13 ___  - on zosyn as of 4/13     Endo:   - No active issues    Heme:   - 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..)  - plt 66, confirmed on blue top + clumping   - called lab and recommended to teams marco antonio malik   - will order repeat blue top  - of note: hep gtt started 4/11 (plt was 143 at that time)  -- plt now 66, consistently downtrending since hep gtt  - MARCELLO score 2-3.. 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 Now extubated as of 4/12. 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    #h/o HTN  #hypotension?  - patient has history of hypertension   - on lisinopril and amlodipine at home   - diarrhea episodes resolved   - no history of heart failure, CAD, renal disease  - off of levo as of 4/12  - 4/13: mild orthostatic hypotension (symptomatic) x1 episode from bed to chair  - hold ACEi, started IVF hydration 100cc x10 hr D5/LR  - could be in setting of persistent fevers and infection     Respiratory:   #Acute hypoxic respiratory failure  - on presentation, pt hypoxic to 80%, NRB with no improvement  - no history of heart failure or CAD   - CT chest suggestive of L sided PNA  - f/u legionella, strep (negative), RVP (negative)  - d/c  CTX, azithromycin  4/11 - 4/12  - started Zosyn i/s/o persistent fevers on 4/13   - chest PT, inspirometry, suctioning     Gastrointestinal:   #Gastroenteritis  - diarrheal episodes x 1 day duration after dinner  - CTAP consistent with colitis, GI PCR positive for EPEC - no indication for antibiotic treatment for that, but getting cross coverage with antibiotics for PNA     #Diet  - hoarseness this AM s/p extubation  - 4/13 improved, did not pass dysphagia screen  - ordered official speech and swallow as well     #change in BM  -  acute diarrhea i/s/o EPEC, now resolved  - h/o constipation, no bm x48 hours, on miralax and senna  - consider enema if no BM    #hepatic cysts  - CT abd/pelv: Scattered hepatic cysts with largest measuring 2.4 x 3.1 cm.     #gastric nodule?  CT abd/pelv:  Small partially calcified nodule along the posterior wall of the gastric body, measuring 1.5 x 1.2 cm.  - f/u with endoscopy for neoplasm r/o?    Renal/:   #HERBERT   - Scr 1.48 --> improved to 1.19  - unknown baseline,   - possible in setting of hypovolemia   - will continue to monitor   - trial mckee removal tonight, 4/13    ID:   #PNA, colitis (EPEC positive)  - BCx 4/10 NGTD x2, UCx neg 4/10  - repeat BCx 4/13 for recurrent, persistent fevers  - f/u repeat BCx/sputum cx from 4/13 ___  - on zosyn as of 4/13     Endo:   - No active issues    Heme:     #thrombocytopenia   - 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..)  - plt 66, confirmed on blue top + clumping   - called lab and recommended to teams marco natonio malik   - will order repeat blue top  - of note: MARCELLO evaluation:   -- hep gtt started 4/11 (plt was 143 at that time)  - plt trend: (4/11)_143, 131, 112; (4/12)_ 114, 76; (4/13)_66  -- plt now 66, consistently downtrending since hep gtt started on 4/11  -- the pt does have a hx of "clumping" --> blue top clumped   - MARCELLO score 4.. need to r/o MARCELLO (+new onset of low plt, +decreased in plt count by 50%, no known thrombosis, +acute systemic rxn (4/13) f/c/hypertension, fever) s/p hep gtt on 4/11   - f/u CAITLIN (if we have), YOON or HIPA  - consider heme consult and update cardio if concerned    DVT PPx:  - heparin gtt started on 4/11  - no necrosis at injection site  - baseline ecchymoisis   - evaluation for HIT (see heme above)    Ethics:  - Full code   Daughter: Ronda Chu - updated at bedside        89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 Now extubated as of 4/12. 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    #h/o HTN  #hypotension?  - patient has history of hypertension   - on lisinopril and amlodipine at home   - diarrhea episodes resolved   - no history of heart failure, CAD, renal disease  - off of levo as of 4/12  - 4/13: mild orthostatic hypotension (symptomatic) x1 episode from bed to chair  - hold ACEi, started IVF hydration 100cc x10 hr D5/LR  - could be in setting of persistent fevers and infection     Respiratory:   #Acute hypoxic respiratory failure  - on presentation, pt hypoxic to 80%, NRB with no improvement  - no history of heart failure or CAD   - CT chest suggestive of L sided PNA  - f/u legionella, strep (negative), RVP (negative)  - d/c  CTX, azithromycin  4/11 - 4/12  - started Zosyn i/s/o persistent fevers on 4/13   - chest PT, inspirometry, suctioning     Gastrointestinal:   #Gastroenteritis  - diarrheal episodes x 1 day duration after dinner  - CTAP consistent with colitis, GI PCR positive for EPEC - no indication for antibiotic treatment for that, but getting cross coverage with antibiotics for PNA     #Diet  - hoarseness this AM s/p extubation  - 4/13 improved, did not pass dysphagia screen  - ordered official speech and swallow as well     #change in BM  -  acute diarrhea i/s/o EPEC, now resolved  - h/o constipation, no bm x48 hours, on miralax and senna  - consider enema if no BM    #hepatic cysts  - CT abd/pelv: Scattered hepatic cysts with largest measuring 2.4 x 3.1 cm.     #gastric nodule?  CT abd/pelv:  Small partially calcified nodule along the posterior wall of the gastric body, measuring 1.5 x 1.2 cm.  - f/u with endoscopy for neoplasm r/o?    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 need to r/o MARCELLO (+new onset of low plt, +decreased in plt count by 50%, no known thrombosis, +acute systemic rxn (4/13) f/c/hypertension, fever) s/p hep gtt on 4/11   - f/u CAITLIN (if we have), YOON or HIPA  - consider heme consult and update cardio if concerned    DVT PPx:  - heparin gtt started on 4/11  - no necrosis at injection site  - baseline ecchymoisis   - evaluation for HIT (see heme above)    Ethics:  - Full code   Daughter: Ronda Chu - updated at bedside

## 2024-04-14 LAB
ALBUMIN SERPL ELPH-MCNC: 2.9 G/DL — LOW (ref 3.3–5)
ALP SERPL-CCNC: 49 U/L — SIGNIFICANT CHANGE UP (ref 40–120)
ALT FLD-CCNC: 16 U/L — SIGNIFICANT CHANGE UP (ref 10–45)
ANION GAP SERPL CALC-SCNC: 12 MMOL/L — SIGNIFICANT CHANGE UP (ref 5–17)
APPEARANCE UR: CLEAR — SIGNIFICANT CHANGE UP
APTT BLD: 23.2 SEC — LOW (ref 24.5–35.6)
AST SERPL-CCNC: 22 U/L — SIGNIFICANT CHANGE UP (ref 10–40)
BACTERIA # UR AUTO: NEGATIVE /HPF — SIGNIFICANT CHANGE UP
BASOPHILS # BLD AUTO: 0.01 K/UL — SIGNIFICANT CHANGE UP (ref 0–0.2)
BASOPHILS NFR BLD AUTO: 0.1 % — SIGNIFICANT CHANGE UP (ref 0–2)
BILIRUB SERPL-MCNC: 0.4 MG/DL — SIGNIFICANT CHANGE UP (ref 0.2–1.2)
BILIRUB UR-MCNC: NEGATIVE — SIGNIFICANT CHANGE UP
BUN SERPL-MCNC: 32 MG/DL — HIGH (ref 7–23)
CALCIUM SERPL-MCNC: 8.7 MG/DL — SIGNIFICANT CHANGE UP (ref 8.4–10.5)
CAST: 1 /LPF — SIGNIFICANT CHANGE UP (ref 0–4)
CHLORIDE SERPL-SCNC: 106 MMOL/L — SIGNIFICANT CHANGE UP (ref 96–108)
CK MB BLD-MCNC: 3.1 % — SIGNIFICANT CHANGE UP (ref 0–3.5)
CK MB BLD-MCNC: 3.5 % — SIGNIFICANT CHANGE UP (ref 0–3.5)
CK MB CFR SERPL CALC: 5 NG/ML — SIGNIFICANT CHANGE UP (ref 0–6.7)
CK MB CFR SERPL CALC: 5.1 NG/ML — SIGNIFICANT CHANGE UP (ref 0–6.7)
CK MB CFR SERPL CALC: 5.3 NG/ML — SIGNIFICANT CHANGE UP (ref 0–6.7)
CK SERPL-CCNC: 147 U/L — SIGNIFICANT CHANGE UP (ref 30–200)
CK SERPL-CCNC: 170 U/L — SIGNIFICANT CHANGE UP (ref 30–200)
CK SERPL-CCNC: 92 U/L — SIGNIFICANT CHANGE UP (ref 30–200)
CO2 SERPL-SCNC: 21 MMOL/L — LOW (ref 22–31)
COLOR SPEC: YELLOW — SIGNIFICANT CHANGE UP
CREAT SERPL-MCNC: 1.4 MG/DL — HIGH (ref 0.5–1.3)
DIFF PNL FLD: ABNORMAL
EGFR: 47 ML/MIN/1.73M2 — LOW
EOSINOPHIL # BLD AUTO: 0.06 K/UL — SIGNIFICANT CHANGE UP (ref 0–0.5)
EOSINOPHIL NFR BLD AUTO: 0.9 % — SIGNIFICANT CHANGE UP (ref 0–6)
GLUCOSE BLDC GLUCOMTR-MCNC: 100 MG/DL — HIGH (ref 70–99)
GLUCOSE BLDC GLUCOMTR-MCNC: 107 MG/DL — HIGH (ref 70–99)
GLUCOSE SERPL-MCNC: 143 MG/DL — HIGH (ref 70–99)
GLUCOSE UR QL: NEGATIVE MG/DL — SIGNIFICANT CHANGE UP
HCT VFR BLD CALC: 35.7 % — LOW (ref 39–50)
HGB BLD-MCNC: 12.1 G/DL — LOW (ref 13–17)
IMM GRANULOCYTES NFR BLD AUTO: 0.6 % — SIGNIFICANT CHANGE UP (ref 0–0.9)
INR BLD: 1.14 RATIO — SIGNIFICANT CHANGE UP (ref 0.85–1.18)
KETONES UR-MCNC: NEGATIVE MG/DL — SIGNIFICANT CHANGE UP
LEUKOCYTE ESTERASE UR-ACNC: NEGATIVE — SIGNIFICANT CHANGE UP
LYMPHOCYTES # BLD AUTO: 0.68 K/UL — LOW (ref 1–3.3)
LYMPHOCYTES # BLD AUTO: 9.7 % — LOW (ref 13–44)
MAGNESIUM SERPL-MCNC: 2.2 MG/DL — SIGNIFICANT CHANGE UP (ref 1.6–2.6)
MCHC RBC-ENTMCNC: 30.2 PG — SIGNIFICANT CHANGE UP (ref 27–34)
MCHC RBC-ENTMCNC: 33.9 GM/DL — SIGNIFICANT CHANGE UP (ref 32–36)
MCV RBC AUTO: 89 FL — SIGNIFICANT CHANGE UP (ref 80–100)
MONOCYTES # BLD AUTO: 0.25 K/UL — SIGNIFICANT CHANGE UP (ref 0–0.9)
MONOCYTES NFR BLD AUTO: 3.6 % — SIGNIFICANT CHANGE UP (ref 2–14)
NEUTROPHILS # BLD AUTO: 5.99 K/UL — SIGNIFICANT CHANGE UP (ref 1.8–7.4)
NEUTROPHILS NFR BLD AUTO: 85.1 % — HIGH (ref 43–77)
NITRITE UR-MCNC: NEGATIVE — SIGNIFICANT CHANGE UP
NRBC # BLD: 0 /100 WBCS — SIGNIFICANT CHANGE UP (ref 0–0)
PH UR: 5.5 — SIGNIFICANT CHANGE UP (ref 5–8)
PHOSPHATE SERPL-MCNC: 2.3 MG/DL — LOW (ref 2.5–4.5)
PLATELET # BLD AUTO: 68 K/UL — LOW (ref 150–400)
POTASSIUM SERPL-MCNC: 3.5 MMOL/L — SIGNIFICANT CHANGE UP (ref 3.5–5.3)
POTASSIUM SERPL-SCNC: 3.5 MMOL/L — SIGNIFICANT CHANGE UP (ref 3.5–5.3)
PROT SERPL-MCNC: 5.6 G/DL — LOW (ref 6–8.3)
PROT UR-MCNC: 30 MG/DL
PROTHROM AB SERPL-ACNC: 12.5 SEC — SIGNIFICANT CHANGE UP (ref 9.5–13)
RBC # BLD: 4.01 M/UL — LOW (ref 4.2–5.8)
RBC # FLD: 13.8 % — SIGNIFICANT CHANGE UP (ref 10.3–14.5)
RBC CASTS # UR COMP ASSIST: 8 /HPF — HIGH (ref 0–4)
SODIUM SERPL-SCNC: 139 MMOL/L — SIGNIFICANT CHANGE UP (ref 135–145)
SP GR SPEC: 1.01 — SIGNIFICANT CHANGE UP (ref 1–1.03)
SQUAMOUS # UR AUTO: 2 /HPF — SIGNIFICANT CHANGE UP (ref 0–5)
TROPONIN T, HIGH SENSITIVITY RESULT: 1317 NG/L — HIGH (ref 0–51)
TROPONIN T, HIGH SENSITIVITY RESULT: 1363 NG/L — HIGH (ref 0–51)
TROPONIN T, HIGH SENSITIVITY RESULT: 1393 NG/L — HIGH (ref 0–51)
UROBILINOGEN FLD QL: 0.2 MG/DL — SIGNIFICANT CHANGE UP (ref 0.2–1)
WBC # BLD: 7.03 K/UL — SIGNIFICANT CHANGE UP (ref 3.8–10.5)
WBC # FLD AUTO: 7.03 K/UL — SIGNIFICANT CHANGE UP (ref 3.8–10.5)
WBC UR QL: 1 /HPF — SIGNIFICANT CHANGE UP (ref 0–5)

## 2024-04-14 PROCEDURE — 99291 CRITICAL CARE FIRST HOUR: CPT

## 2024-04-14 RX ORDER — SACUBITRIL AND VALSARTAN 24; 26 MG/1; MG/1
1 TABLET, FILM COATED ORAL
Refills: 0 | Status: DISCONTINUED | OUTPATIENT
Start: 2024-04-14 | End: 2024-04-14

## 2024-04-14 RX ORDER — SODIUM CHLORIDE 0.65 %
1 AEROSOL, SPRAY (ML) NASAL
Refills: 0 | Status: DISCONTINUED | OUTPATIENT
Start: 2024-04-14 | End: 2024-04-22

## 2024-04-14 RX ORDER — METOPROLOL TARTRATE 50 MG
25 TABLET ORAL EVERY 12 HOURS
Refills: 0 | Status: DISCONTINUED | OUTPATIENT
Start: 2024-04-14 | End: 2024-04-16

## 2024-04-14 RX ORDER — METOPROLOL TARTRATE 50 MG
12.5 TABLET ORAL
Refills: 0 | Status: DISCONTINUED | OUTPATIENT
Start: 2024-04-14 | End: 2024-04-14

## 2024-04-14 RX ORDER — LANOLIN ALCOHOL/MO/W.PET/CERES
3 CREAM (GRAM) TOPICAL AT BEDTIME
Refills: 0 | Status: DISCONTINUED | OUTPATIENT
Start: 2024-04-14 | End: 2024-04-22

## 2024-04-14 RX ORDER — AMLODIPINE BESYLATE 2.5 MG/1
5 TABLET ORAL DAILY
Refills: 0 | Status: DISCONTINUED | OUTPATIENT
Start: 2024-04-14 | End: 2024-04-22

## 2024-04-14 RX ORDER — ASPIRIN/CALCIUM CARB/MAGNESIUM 324 MG
81 TABLET ORAL ONCE
Refills: 0 | Status: DISCONTINUED | OUTPATIENT
Start: 2024-04-14 | End: 2024-04-14

## 2024-04-14 RX ORDER — LABETALOL HCL 100 MG
10 TABLET ORAL ONCE
Refills: 0 | Status: DISCONTINUED | OUTPATIENT
Start: 2024-04-14 | End: 2024-04-14

## 2024-04-14 RX ORDER — POTASSIUM CHLORIDE 20 MEQ
40 PACKET (EA) ORAL ONCE
Refills: 0 | Status: COMPLETED | OUTPATIENT
Start: 2024-04-14 | End: 2024-04-14

## 2024-04-14 RX ORDER — SODIUM,POTASSIUM PHOSPHATES 278-250MG
1 POWDER IN PACKET (EA) ORAL ONCE
Refills: 0 | Status: COMPLETED | OUTPATIENT
Start: 2024-04-14 | End: 2024-04-14

## 2024-04-14 RX ORDER — ACETAMINOPHEN 500 MG
1000 TABLET ORAL ONCE
Refills: 0 | Status: COMPLETED | OUTPATIENT
Start: 2024-04-14 | End: 2024-04-14

## 2024-04-14 RX ORDER — FLUTICASONE PROPIONATE 50 MCG
1 SPRAY, SUSPENSION NASAL
Refills: 0 | Status: DISCONTINUED | OUTPATIENT
Start: 2024-04-14 | End: 2024-04-22

## 2024-04-14 RX ORDER — ASPIRIN/CALCIUM CARB/MAGNESIUM 324 MG
81 TABLET ORAL DAILY
Refills: 0 | Status: DISCONTINUED | OUTPATIENT
Start: 2024-04-14 | End: 2024-04-22

## 2024-04-14 RX ORDER — SENNA PLUS 8.6 MG/1
1 TABLET ORAL DAILY
Refills: 0 | Status: DISCONTINUED | OUTPATIENT
Start: 2024-04-14 | End: 2024-04-20

## 2024-04-14 RX ORDER — METOPROLOL TARTRATE 50 MG
12.5 TABLET ORAL ONCE
Refills: 0 | Status: COMPLETED | OUTPATIENT
Start: 2024-04-14 | End: 2024-04-14

## 2024-04-14 RX ORDER — LIDOCAINE HCL 20 MG/ML
10 VIAL (ML) INJECTION DAILY
Refills: 0 | Status: DISCONTINUED | OUTPATIENT
Start: 2024-04-14 | End: 2024-04-16

## 2024-04-14 RX ORDER — SACUBITRIL AND VALSARTAN 24; 26 MG/1; MG/1
1 TABLET, FILM COATED ORAL
Refills: 0 | Status: DISCONTINUED | OUTPATIENT
Start: 2024-04-14 | End: 2024-04-16

## 2024-04-14 RX ADMIN — Medication 10 MILLILITER(S): at 15:09

## 2024-04-14 RX ADMIN — Medication 3 MILLILITER(S): at 11:05

## 2024-04-14 RX ADMIN — Medication 40 MILLIEQUIVALENT(S): at 02:26

## 2024-04-14 RX ADMIN — Medication 1000 MILLIGRAM(S): at 16:00

## 2024-04-14 RX ADMIN — CLOPIDOGREL BISULFATE 75 MILLIGRAM(S): 75 TABLET, FILM COATED ORAL at 12:54

## 2024-04-14 RX ADMIN — Medication 1 SPRAY(S): at 17:54

## 2024-04-14 RX ADMIN — Medication 3 MILLILITER(S): at 17:07

## 2024-04-14 RX ADMIN — Medication 3 MILLILITER(S): at 23:46

## 2024-04-14 RX ADMIN — Medication 25 MILLIGRAM(S): at 17:54

## 2024-04-14 RX ADMIN — SACUBITRIL AND VALSARTAN 1 TABLET(S): 24; 26 TABLET, FILM COATED ORAL at 15:50

## 2024-04-14 RX ADMIN — Medication 1 PACKET(S): at 02:26

## 2024-04-14 RX ADMIN — Medication 81 MILLIGRAM(S): at 05:28

## 2024-04-14 RX ADMIN — AMLODIPINE BESYLATE 5 MILLIGRAM(S): 2.5 TABLET ORAL at 12:54

## 2024-04-14 RX ADMIN — PIPERACILLIN AND TAZOBACTAM 25 GRAM(S): 4; .5 INJECTION, POWDER, LYOPHILIZED, FOR SOLUTION INTRAVENOUS at 05:39

## 2024-04-14 RX ADMIN — ATORVASTATIN CALCIUM 80 MILLIGRAM(S): 80 TABLET, FILM COATED ORAL at 10:25

## 2024-04-14 RX ADMIN — PANTOPRAZOLE SODIUM 40 MILLIGRAM(S): 20 TABLET, DELAYED RELEASE ORAL at 12:54

## 2024-04-14 RX ADMIN — PIPERACILLIN AND TAZOBACTAM 25 GRAM(S): 4; .5 INJECTION, POWDER, LYOPHILIZED, FOR SOLUTION INTRAVENOUS at 01:25

## 2024-04-14 RX ADMIN — Medication 12.5 MILLIGRAM(S): at 05:38

## 2024-04-14 RX ADMIN — Medication 400 MILLIGRAM(S): at 15:39

## 2024-04-14 RX ADMIN — PIPERACILLIN AND TAZOBACTAM 25 GRAM(S): 4; .5 INJECTION, POWDER, LYOPHILIZED, FOR SOLUTION INTRAVENOUS at 15:08

## 2024-04-14 RX ADMIN — Medication 12.5 MILLIGRAM(S): at 10:37

## 2024-04-14 RX ADMIN — Medication 3 MILLILITER(S): at 05:28

## 2024-04-14 RX ADMIN — PIPERACILLIN AND TAZOBACTAM 25 GRAM(S): 4; .5 INJECTION, POWDER, LYOPHILIZED, FOR SOLUTION INTRAVENOUS at 21:36

## 2024-04-14 NOTE — PROGRESS NOTE ADULT - ATTENDING COMMENTS
89 yo WM with h/o HTN and benign postural vertigo presented 2 to diffuse watery diarrhea x 1 day duration. Diarrheal episodes and non-bloody, non-bilious emesis started after having vegetable soup and turkey sandwich for dinner. Pt reports multiple episodes of loose watery diarrhea, unable to control, and had an episode of watery stool in ER. Pt was noted to be hypoxic with crackles and wheezing, hypoxic to 80% on room air, requiring non-rebreather 10L, Pt subsequently continued to be hypoxic while on NRB, and subsequently intubated. Pt sent for CT (1) Scattered bilateral groundglass opacities and left lower lobe consolidation, concerning for pneumonia 2) Debris within the bilateral lower lobe bronchi and obliteration of the left lower lobe bronchus 3) No bowel obstruction. Colitis involving the ascending, transverse and proximal descending colon 4) Partially calcified nodule along the posterior gastric wall, concerning for neoplasm. Endoscopy suggested for further evaluation.5) Left adrenal 9 mm hypodense nodule. Consider nonemergent dedicated adrenal CT or MRI for further evaluation)  and then became hypotensive (felt to be 2 to Propofol); Rx'd with Epi. Noted to have ST elevation on monitor and EKG which alleged improved with improvement of BP. ICU dx: 1) AHRF requiring intubation 2 to PNA 2) Septic shock 2 to PNA + Colitis 3) STEMI 4) HERBERT 2 to ATN.       Resp:  resp status improving, doing well post extubation  cont airway clearance, chest pt and suctioning  maintain o2 sat>90%    ID: Stool with enteropathogenic E. 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

## 2024-04-14 NOTE — PROGRESS NOTE ADULT - SUBJECTIVE AND OBJECTIVE BOX
Patient is a 89y old  Male who presents with a chief complaint of Hypoxia requiring intubation (13 Apr 2024 07:42)      INTERVAL HPI/OVERNIGHT EVENTS:   No overnight events   Afebrile, hemodynamically stable     ICU Vital Signs Last 24 Hrs  T(C): 37.7 (14 Apr 2024 04:00), Max: 38.6 (13 Apr 2024 16:00)  T(F): 99.9 (14 Apr 2024 04:00), Max: 101.5 (13 Apr 2024 16:00)  HR: 90 (14 Apr 2024 05:37) (71 - 95)  BP: 147/67 (14 Apr 2024 04:00) (92/54 - 157/74)  BP(mean): 96 (14 Apr 2024 04:00) (67 - 106)  ABP: --  ABP(mean): --  RR: 24 (14 Apr 2024 04:00) (22 - 47)  SpO2: 93% (14 Apr 2024 05:37) (93% - 100%)    O2 Parameters below as of 14 Apr 2024 05:37  Patient On (Oxygen Delivery Method): nasal cannula          I&O's Summary    13 Apr 2024 07:01  -  14 Apr 2024 07:00  --------------------------------------------------------  IN: 2093 mL / OUT: 475 mL / NET: 1618 mL          LABS:                        12.1   7.03  )-----------( 68       ( 14 Apr 2024 01:21 )             35.7     04-14    139  |  106  |  32<H>  ----------------------------<  143<H>  3.5   |  21<L>  |  1.40<H>    Ca    8.7      14 Apr 2024 01:21  Phos  2.3     04-14  Mg     2.2     04-14    TPro  5.6<L>  /  Alb  2.9<L>  /  TBili  0.4  /  DBili  x   /  AST  22  /  ALT  16  /  AlkPhos  49  04-14    PT/INR - ( 14 Apr 2024 01:21 )   PT: 12.5 sec;   INR: 1.14 ratio         PTT - ( 14 Apr 2024 01:21 )  PTT:23.2 sec  Urinalysis Basic - ( 14 Apr 2024 01:21 )    Color: x / Appearance: x / SG: x / pH: x  Gluc: 143 mg/dL / Ketone: x  / Bili: x / Urobili: x   Blood: x / Protein: x / Nitrite: x   Leuk Esterase: x / RBC: x / WBC x   Sq Epi: x / Non Sq Epi: x / Bacteria: x      CAPILLARY BLOOD GLUCOSE      POCT Blood Glucose.: 141 mg/dL (13 Apr 2024 17:39)  POCT Blood Glucose.: 107 mg/dL (13 Apr 2024 11:59)        RADIOLOGY & ADDITIONAL TESTS:    Consultant(s) Notes Reviewed:  [x ] YES  [ ] NO    MEDICATIONS  (STANDING):  albuterol/ipratropium for Nebulization 3 milliLiter(s) Nebulizer every 6 hours  aspirin  chewable 81 milliGRAM(s) Oral daily  atorvastatin 80 milliGRAM(s) Oral every 24 hours  chlorhexidine 4% Liquid 1 Application(s) Topical <User Schedule>  clopidogrel Tablet 75 milliGRAM(s) Enteral Tube daily  dextrose 5% + lactated ringers. 1000 milliLiter(s) (100 mL/Hr) IV Continuous <Continuous>  metoprolol tartrate 12.5 milliGRAM(s) Oral two times a day  pantoprazole  Injectable 40 milliGRAM(s) IV Push daily  piperacillin/tazobactam IVPB.. 3.375 Gram(s) IV Intermittent every 8 hours  polyethylene glycol 3350 17 Gram(s) Oral daily  senna Syrup 10 milliLiter(s) Oral at bedtime    MEDICATIONS  (PRN):      PHYSICAL EXAM:  General: Comfortable, sedated  Neurological: sedated  HEENT: NC/AT; EOMI, clear conjunctiva, no nasal or oropharyngeal discharge or exudates  Cardiovascular: +S1/S2, no murmurs/rubs/gallops, RRR  Respiratory: CTA B/L, no diminished breath sounds, no wheezes/rales/rhonchi, no increased work of breathing or accessory muscle use  Gastrointestinal: soft, NT/ND; active BSx4 quadrants  Genitourinary: no suprapubic tenderness, no CVA tenderness  Extremities: no edema, clubbing or cyanosis  Vascular: 2+ radial, DP/PT pulses B/L  Skin: no rashes  Lines/Drains:     Care Discussed with Consultants/Other Providers [ x] YES  [ ] NO Patient is a 89y old  Male who presents with a chief complaint of Hypoxia requiring intubation.      INTERVAL HPI/OVERNIGHT EVENTS:   Ovn febrile to 38.6C.    ICU Vital Signs Last 24 Hrs  T(C): 37.7 (14 Apr 2024 04:00), Max: 38.6 (13 Apr 2024 16:00)  T(F): 99.9 (14 Apr 2024 04:00), Max: 101.5 (13 Apr 2024 16:00)  HR: 90 (14 Apr 2024 05:37) (71 - 95)  BP: 147/67 (14 Apr 2024 04:00) (92/54 - 157/74)  BP(mean): 96 (14 Apr 2024 04:00) (67 - 106)  ABP: --  ABP(mean): --  RR: 24 (14 Apr 2024 04:00) (22 - 47)  SpO2: 93% (14 Apr 2024 05:37) (93% - 100%)    O2 Parameters below as of 14 Apr 2024 05:37  Patient On (Oxygen Delivery Method): nasal cannula          I&O's Summary    13 Apr 2024 07:01  -  14 Apr 2024 07:00  --------------------------------------------------------  IN: 2093 mL / OUT: 475 mL / NET: 1618 mL          LABS:                        12.1   7.03  )-----------( 68       ( 14 Apr 2024 01:21 )             35.7     04-14    139  |  106  |  32<H>  ----------------------------<  143<H>  3.5   |  21<L>  |  1.40<H>    Ca    8.7      14 Apr 2024 01:21  Phos  2.3     04-14  Mg     2.2     04-14    TPro  5.6<L>  /  Alb  2.9<L>  /  TBili  0.4  /  DBili  x   /  AST  22  /  ALT  16  /  AlkPhos  49  04-14    PT/INR - ( 14 Apr 2024 01:21 )   PT: 12.5 sec;   INR: 1.14 ratio         PTT - ( 14 Apr 2024 01:21 )  PTT:23.2 sec  Urinalysis Basic - ( 14 Apr 2024 01:21 )    Color: x / Appearance: x / SG: x / pH: x  Gluc: 143 mg/dL / Ketone: x  / Bili: x / Urobili: x   Blood: x / Protein: x / Nitrite: x   Leuk Esterase: x / RBC: x / WBC x   Sq Epi: x / Non Sq Epi: x / Bacteria: x      CAPILLARY BLOOD GLUCOSE      POCT Blood Glucose.: 141 mg/dL (13 Apr 2024 17:39)  POCT Blood Glucose.: 107 mg/dL (13 Apr 2024 11:59)        RADIOLOGY & ADDITIONAL TESTS:    Consultant(s) Notes Reviewed:  [x ] YES  [ ] NO    MEDICATIONS  (STANDING):  albuterol/ipratropium for Nebulization 3 milliLiter(s) Nebulizer every 6 hours  aspirin  chewable 81 milliGRAM(s) Oral daily  atorvastatin 80 milliGRAM(s) Oral every 24 hours  chlorhexidine 4% Liquid 1 Application(s) Topical <User Schedule>  clopidogrel Tablet 75 milliGRAM(s) Enteral Tube daily  dextrose 5% + lactated ringers. 1000 milliLiter(s) (100 mL/Hr) IV Continuous <Continuous>  metoprolol tartrate 12.5 milliGRAM(s) Oral two times a day  pantoprazole  Injectable 40 milliGRAM(s) IV Push daily  piperacillin/tazobactam IVPB.. 3.375 Gram(s) IV Intermittent every 8 hours  polyethylene glycol 3350 17 Gram(s) Oral daily  senna Syrup 10 milliLiter(s) Oral at bedtime    MEDICATIONS  (PRN):      PHYSICAL EXAM:  General: Comfortable  HEENT: NC/AT  Cardiovascular: +S1/S2, no murmurs/rubs/gallops, RRR  Respiratory: rhonchorous sounds R >L  Gastrointestinal: soft, NT/ND; active BSx4 quadrants  Extremities: no edema, clubbing or cyanosis  Vascular: 2+ radial, DP/PT pulses B/L      Care Discussed with Consultants/Other Providers [ x] YES  [ ] NO

## 2024-04-14 NOTE — CHART NOTE - NSCHARTNOTEFT_GEN_A_CORE
MICU Transfer Note  ---------------------------    Transfer from: MICU  Transfer to:  ( x ) Medicine    (  ) Telemetry    (  ) RCU    (  ) Palliative    (  ) Stroke Unit    (  ) _______________  Accepting Physician:      MICU COURSE    89 yo M with h/o HTN and benign postural vertigo presented 2 to diffuse watery diarrhea x 1 day duration. Diarrheal episodes and non-bloody, non-bilious emesis started after having vegetable soup and turkey sandwich for dinner. In the ED, noted to be hypoxic with crackles and wheezing, hypoxic to 80% and subsequently intubated. CTC showed scattered bilateral ground-glass opacities and left lower lobe consolidation, concerning for pneumonia. CTAP showed colitis involving the ascending, transverse and proximal descending colon with partially calcified nodule along the posterior gastric wall, concerning for neoplasm. Endoscopy suggested for further evaluation; also CTAP found Left adrenal 9 mm hypodense nodule. While in the CT scan, became acutely hypotensive (felt to be 2 to Propofol); Rx'd with Epi. Noted to have ST elevation on monitor and EKG which alleged improved with improvement of BP. Cardiology consulted, suspects likely 2/2 demand and possible ischemic component. Started on CTX/azithromycin 4/11-4/13, but due to persistent fevers (BCx NGTD, UA negative), broadened to zosyn 4/13-. Cardiac enzymes continued to rise and EKG showing Q-wave - cardiology still recommending conservative management. C/b thrombocytopenia, Hep PF4 Ab negative. Pending S/S eval given failed dysphagia screen.      To-Do:  [ ] uptitration of GDMT meds given STEMI, new reduced EF in setting of ischemic cardiomyopathy  [ ] f/u S/S eval  [ ] f/u BCx, UA; on zosyn 4/13 - (previously on CTX 4/11-4/13)    [ ] Consider nonemergent dedicated adrenal CT or MRI for further evaluation for L adrenal nodule   [ ] consider outpt EGD to eval gastric nodule    OBJECTIVE --  Vital Signs Last 24 Hrs  T(C): 37.9 (14 Apr 2024 08:00), Max: 38.6 (13 Apr 2024 16:00)  T(F): 100.2 (14 Apr 2024 08:00), Max: 101.5 (13 Apr 2024 16:00)  HR: 88 (14 Apr 2024 11:15) (73 - 99)  BP: 179/86 (14 Apr 2024 11:15) (92/54 - 205/93)  BP(mean): 123 (14 Apr 2024 11:15) (67 - 134)  RR: 33 (14 Apr 2024 11:15) (22 - 47)  SpO2: 97% (14 Apr 2024 11:15) (92% - 100%)    Parameters below as of 14 Apr 2024 11:05  Patient On (Oxygen Delivery Method): nasal cannula        I&O's Summary    13 Apr 2024 07:01  -  14 Apr 2024 07:00  --------------------------------------------------------  IN: 2118 mL / OUT: 475 mL / NET: 1643 mL    14 Apr 2024 07:01  -  14 Apr 2024 12:07  --------------------------------------------------------  IN: 50 mL / OUT: 200 mL / NET: -150 mL        MEDICATIONS  (STANDING):  albuterol/ipratropium for Nebulization 3 milliLiter(s) Nebulizer every 6 hours  amLODIPine   Tablet 5 milliGRAM(s) Oral daily  aspirin  chewable 81 milliGRAM(s) Oral daily  atorvastatin 80 milliGRAM(s) Oral every 24 hours  bisacodyl Suppository 10 milliGRAM(s) Rectal daily  chlorhexidine 4% Liquid 1 Application(s) Topical <User Schedule>  clopidogrel Tablet 75 milliGRAM(s) Enteral Tube daily  fluticasone propionate 50 MICROgram(s)/spray Nasal Spray 1 Spray(s) Both Nostrils two times a day  metoprolol tartrate 25 milliGRAM(s) Oral every 12 hours  pantoprazole  Injectable 40 milliGRAM(s) IV Push daily  piperacillin/tazobactam IVPB.. 3.375 Gram(s) IV Intermittent every 8 hours  polyethylene glycol 3350 17 Gram(s) Oral daily  sacubitril 24 mG/valsartan 26 mG 1 Tablet(s) Oral two times a day  senna 1 Tablet(s) Oral daily  senna Syrup 10 milliLiter(s) Oral at bedtime  sodium chloride 0.65% Nasal 1 Spray(s) Both Nostrils two times a day    MEDICATIONS  (PRN):        LABS                                            12.1                  Neurophils% (auto):   85.1   (04-14 @ 01:21):    7.03 )-----------(68           Lymphocytes% (auto):  9.7                                           35.7                   Eosinphils% (auto):   0.9      Manual%: Neutrophils x    ; Lymphocytes x    ; Eosinophils x    ; Bands%: x    ; Blasts x                                    139    |  106    |  32                  Calcium: 8.7   / iCa: x      (04-14 @ 01:21)    ----------------------------<  143       Magnesium: 2.2                              3.5     |  21     |  1.40             Phosphorous: 2.3      TPro  5.6    /  Alb  2.9    /  TBili  0.4    /  DBili  x      /  AST  22     /  ALT  16     /  AlkPhos  49     14 Apr 2024 01:21    ( 04-14 @ 01:21 )   PT: 12.5 sec;   INR: 1.14 ratio  aPTT: 23.2 sec          ASSESSMENT & PLAN:         For Follow-Up: MICU Transfer Note  ---------------------------    Transfer from: MICU  Transfer to:  ( ) Medicine    ( x ) Telemetry    (  ) RCU    (  ) Palliative    (  ) Stroke Unit    (  ) _______________  Accepting Physician:      MICU COURSE    89 yo M with h/o HTN and benign postural vertigo presented 2 to diffuse watery diarrhea x 1 day duration. Diarrheal episodes and non-bloody, non-bilious emesis started after having vegetable soup and turkey sandwich for dinner. In the ED, noted to be hypoxic with crackles and wheezing, hypoxic to 80% and subsequently intubated. CTC showed scattered bilateral ground-glass opacities and left lower lobe consolidation, concerning for pneumonia. CTAP showed colitis involving the ascending, transverse and proximal descending colon with partially calcified nodule along the posterior gastric wall, concerning for neoplasm. Endoscopy suggested for further evaluation; also CTAP found Left adrenal 9 mm hypodense nodule. While in the CT scan, became acutely hypotensive (felt to be 2 to Propofol); Rx'd with Epi. Noted to have ST elevation on monitor and EKG which alleged improved with improvement of BP. Cardiology consulted, suspects likely 2/2 demand and possible ischemic component. Started on CTX/azithromycin 4/11-4/13, but due to persistent fevers (BCx NGTD, UA negative), broadened to zosyn 4/13-. Cardiac enzymes continued to rise and EKG showing Q-wave - cardiology still recommending conservative management. C/b thrombocytopenia, Hep PF4 Ab negative. Pending S/S eval given failed dysphagia screen. Family discussion today informed of CTAP gastric wall nodule - family wishes to withhold that information from pt at this time given pt unlikely to want chemo.      To-Do:  [ ] uptitration of GDMT meds given STEMI, new reduced EF in setting of ischemic cardiomyopathy; f/u cardiology recs   [ ] f/u S/S eval recs   [ ] f/u BCx, UA; on zosyn 4/13 - (previously on CTX 4/11-4/13)    [ ] Consider nonemergent dedicated adrenal CT or MRI for further evaluation for L adrenal nodule   [ ] consider outpt EGD to eval gastric nodule, but per family discussion: will withhold that information from pt for now as he is unlikely to want chemotherapy     OBJECTIVE --  Vital Signs Last 24 Hrs  T(C): 37.9 (14 Apr 2024 08:00), Max: 38.6 (13 Apr 2024 16:00)  T(F): 100.2 (14 Apr 2024 08:00), Max: 101.5 (13 Apr 2024 16:00)  HR: 88 (14 Apr 2024 11:15) (73 - 99)  BP: 179/86 (14 Apr 2024 11:15) (92/54 - 205/93)  BP(mean): 123 (14 Apr 2024 11:15) (67 - 134)  RR: 33 (14 Apr 2024 11:15) (22 - 47)  SpO2: 97% (14 Apr 2024 11:15) (92% - 100%)    Parameters below as of 14 Apr 2024 11:05  Patient On (Oxygen Delivery Method): nasal cannula        I&O's Summary    13 Apr 2024 07:01  -  14 Apr 2024 07:00  --------------------------------------------------------  IN: 2118 mL / OUT: 475 mL / NET: 1643 mL    14 Apr 2024 07:01  -  14 Apr 2024 12:07  --------------------------------------------------------  IN: 50 mL / OUT: 200 mL / NET: -150 mL        MEDICATIONS  (STANDING):  albuterol/ipratropium for Nebulization 3 milliLiter(s) Nebulizer every 6 hours  amLODIPine   Tablet 5 milliGRAM(s) Oral daily  aspirin  chewable 81 milliGRAM(s) Oral daily  atorvastatin 80 milliGRAM(s) Oral every 24 hours  bisacodyl Suppository 10 milliGRAM(s) Rectal daily  chlorhexidine 4% Liquid 1 Application(s) Topical <User Schedule>  clopidogrel Tablet 75 milliGRAM(s) Enteral Tube daily  fluticasone propionate 50 MICROgram(s)/spray Nasal Spray 1 Spray(s) Both Nostrils two times a day  metoprolol tartrate 25 milliGRAM(s) Oral every 12 hours  pantoprazole  Injectable 40 milliGRAM(s) IV Push daily  piperacillin/tazobactam IVPB.. 3.375 Gram(s) IV Intermittent every 8 hours  polyethylene glycol 3350 17 Gram(s) Oral daily  sacubitril 24 mG/valsartan 26 mG 1 Tablet(s) Oral two times a day  senna 1 Tablet(s) Oral daily  senna Syrup 10 milliLiter(s) Oral at bedtime  sodium chloride 0.65% Nasal 1 Spray(s) Both Nostrils two times a day    MEDICATIONS  (PRN):        LABS                                            12.1                  Neurophils% (auto):   85.1   (04-14 @ 01:21):    7.03 )-----------(68           Lymphocytes% (auto):  9.7                                           35.7                   Eosinphils% (auto):   0.9      Manual%: Neutrophils x    ; Lymphocytes x    ; Eosinophils x    ; Bands%: x    ; Blasts x                                    139    |  106    |  32                  Calcium: 8.7   / iCa: x      (04-14 @ 01:21)    ----------------------------<  143       Magnesium: 2.2                              3.5     |  21     |  1.40             Phosphorous: 2.3      TPro  5.6    /  Alb  2.9    /  TBili  0.4    /  DBili  x      /  AST  22     /  ALT  16     /  AlkPhos  49     14 Apr 2024 01:21    ( 04-14 @ 01:21 )   PT: 12.5 sec;   INR: 1.14 ratio  aPTT: 23.2 sec          ASSESSMENT & PLAN:         For Follow-Up: MICU Transfer Note  ---------------------------    Transfer from: MICU  Transfer to:  ( ) Medicine    ( x ) Telemetry    (  ) RCU    (  ) Palliative    (  ) Stroke Unit    (  ) _______________  Accepting Physician:      MICU COURSE    89 yo M with h/o HTN and benign postural vertigo presented 2 to diffuse watery diarrhea x 1 day duration. Diarrheal episodes and non-bloody, non-bilious emesis started after having vegetable soup and turkey sandwich for dinner. In the ED, noted to be hypoxic with crackles and wheezing, hypoxic to 80% and subsequently intubated. CTC showed scattered bilateral ground-glass opacities and left lower lobe consolidation, concerning for pneumonia. CTAP showed colitis involving the ascending, transverse and proximal descending colon with partially calcified nodule along the posterior gastric wall, concerning for neoplasm. Endoscopy suggested for further evaluation; also CTAP found Left adrenal 9 mm hypodense nodule. While in the CT scan, became acutely hypotensive (felt to be 2 to Propofol); Rx'd with Epi. Noted to have ST elevation on monitor and EKG which alleged improved with improvement of BP. Cardiology consulted, suspects likely 2/2 demand and possible ischemic component. Started on CTX/azithromycin 4/11-4/13, but due to persistent fevers (BCx NGTD, UA negative), broadened to zosyn 4/13-. Cardiac enzymes continued to rise and EKG showing Q-wave - cardiology still recommending conservative management. C/b thrombocytopenia, Hep PF4 Ab negative. Pending S/S eval given failed dysphagia screen. Family discussion today informed of CTAP gastric wall nodule - family wishes to withhold that information from pt at this time given pt unlikely to want chemo.      To-Do:  [ ] uptitration of GDMT meds given STEMI, new reduced EF in setting of ischemic cardiomyopathy; f/u cardiology recs   [ ] f/u S/S eval recs   [ ] f/u BCx, UA; on zosyn 4/13 - (previously on CTX 4/11-4/13)    [ ] Consider nonemergent dedicated adrenal CT or MRI for further evaluation for L adrenal nodule   [ ] consider outpt EGD to eval gastric nodule, but per family discussion: will withhold that information from pt for now as he is unlikely to want chemotherapy     OBJECTIVE --  Vital Signs Last 24 Hrs  T(C): 37.9 (14 Apr 2024 08:00), Max: 38.6 (13 Apr 2024 16:00)  T(F): 100.2 (14 Apr 2024 08:00), Max: 101.5 (13 Apr 2024 16:00)  HR: 88 (14 Apr 2024 11:15) (73 - 99)  BP: 179/86 (14 Apr 2024 11:15) (92/54 - 205/93)  BP(mean): 123 (14 Apr 2024 11:15) (67 - 134)  RR: 33 (14 Apr 2024 11:15) (22 - 47)  SpO2: 97% (14 Apr 2024 11:15) (92% - 100%)    Parameters below as of 14 Apr 2024 11:05  Patient On (Oxygen Delivery Method): nasal cannula        I&O's Summary    13 Apr 2024 07:01  -  14 Apr 2024 07:00  --------------------------------------------------------  IN: 2118 mL / OUT: 475 mL / NET: 1643 mL    14 Apr 2024 07:01  -  14 Apr 2024 12:07  --------------------------------------------------------  IN: 50 mL / OUT: 200 mL / NET: -150 mL        MEDICATIONS  (STANDING):  albuterol/ipratropium for Nebulization 3 milliLiter(s) Nebulizer every 6 hours  amLODIPine   Tablet 5 milliGRAM(s) Oral daily  aspirin  chewable 81 milliGRAM(s) Oral daily  atorvastatin 80 milliGRAM(s) Oral every 24 hours  bisacodyl Suppository 10 milliGRAM(s) Rectal daily  chlorhexidine 4% Liquid 1 Application(s) Topical <User Schedule>  clopidogrel Tablet 75 milliGRAM(s) Enteral Tube daily  fluticasone propionate 50 MICROgram(s)/spray Nasal Spray 1 Spray(s) Both Nostrils two times a day  metoprolol tartrate 25 milliGRAM(s) Oral every 12 hours  pantoprazole  Injectable 40 milliGRAM(s) IV Push daily  piperacillin/tazobactam IVPB.. 3.375 Gram(s) IV Intermittent every 8 hours  polyethylene glycol 3350 17 Gram(s) Oral daily  sacubitril 24 mG/valsartan 26 mG 1 Tablet(s) Oral two times a day  senna 1 Tablet(s) Oral daily  senna Syrup 10 milliLiter(s) Oral at bedtime  sodium chloride 0.65% Nasal 1 Spray(s) Both Nostrils two times a day    MEDICATIONS  (PRN):        LABS                                            12.1                  Neurophils% (auto):   85.1   (04-14 @ 01:21):    7.03 )-----------(68           Lymphocytes% (auto):  9.7                                           35.7                   Eosinphils% (auto):   0.9      Manual%: Neutrophils x    ; Lymphocytes x    ; Eosinophils x    ; Bands%: x    ; Blasts x                                    139    |  106    |  32                  Calcium: 8.7   / iCa: x      (04-14 @ 01:21)    ----------------------------<  143       Magnesium: 2.2                              3.5     |  21     |  1.40             Phosphorous: 2.3      TPro  5.6    /  Alb  2.9    /  TBili  0.4    /  DBili  x      /  AST  22     /  ALT  16     /  AlkPhos  49     14 Apr 2024 01:21    ( 04-14 @ 01:21 )   PT: 12.5 sec;   INR: 1.14 ratio  aPTT: 23.2 sec          ASSESSMENT & PLAN:   89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 Now extubated as of 4/12.     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  - patient has history of hypertension   - on lisinopril and amlodipine at home   - diarrhea episodes resolved   - no history of heart failure, CAD, renal disease  - off of levo as of 4/12  - 4/13: mild orthostatic hypotension (symptomatic) x1 episode from bed to chair  - hold ACEi, started IVF hydration 100cc x10 hr D5/LR    Respiratory:   #Acute hypoxic respiratory failure  - on presentation, pt hypoxic to 80%, NRB with no improvement  - no history of heart failure or CAD   - CT chest suggestive of L sided PNA  - f/u legionella, strep (negative), RVP (negative)  - d/c  CTX, azithromycin  4/11 - 4/12  - started Zosyn i/s/o persistent fevers on 4/13   - chest PT, inspirometry, suctioning     Gastrointestinal:   #Gastroenteritis  - diarrheal episodes x 1 day duration after dinner  - CTAP consistent with colitis, GI PCR positive for EPEC - no indication for antibiotic treatment for that, but getting cross coverage with antibiotics for PNA     #Diet  - hoarseness this AM s/p extubation  - 4/13 improved, did not pass dysphagia screen  - f/u official speech and swallow eval    #change in BM  -  acute diarrhea i/s/o EPEC, now resolved  - h/o constipation, no bm x48 hours, started on dulcolax/senna for now     #hepatic cysts  - CT abd/pelv: Scattered hepatic cysts with largest measuring 2.4 x 3.1 cm.     #gastric nodule?  CT abd/pelv:  Small partially calcified nodule along the posterior wall of the gastric body, measuring 1.5 x 1.2 cm.  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 MICU Transfer Note  ---------------------------    Transfer from: MICU  Transfer to:  ( ) Medicine    ( x ) Telemetry    (  ) RCU    (  ) Palliative    (  ) Stroke Unit    (  ) _______________  Accepting Physician: Dr Edna Mcintosh      MICU COURSE    89 yo M with h/o HTN and benign postural vertigo presented 2 to diffuse watery diarrhea x 1 day duration. Diarrheal episodes and non-bloody, non-bilious emesis started after having vegetable soup and turkey sandwich for dinner. In the ED, noted to be hypoxic with crackles and wheezing, hypoxic to 80% and subsequently intubated. CTC showed scattered bilateral ground-glass opacities and left lower lobe consolidation, concerning for pneumonia. CTAP showed colitis involving the ascending, transverse and proximal descending colon with partially calcified nodule along the posterior gastric wall, concerning for neoplasm. Endoscopy suggested for further evaluation; also CTAP found Left adrenal 9 mm hypodense nodule. While in the CT scan, became acutely hypotensive (felt to be 2 to Propofol); Rx'd with Epi. Noted to have ST elevation on monitor and EKG which alleged improved with improvement of BP. Cardiology consulted, suspects likely 2/2 demand and possible ischemic component. Started on CTX/azithromycin 4/11-4/13, but due to persistent fevers (BCx NGTD, UA negative), broadened to zosyn 4/13-. Cardiac enzymes continued to rise and EKG showing Q-wave - cardiology still recommending conservative management. C/b thrombocytopenia, Hep PF4 Ab negative. Pending S/S eval given failed dysphagia screen. Family discussion today informed of CTAP gastric wall nodule - family wishes to withhold that information from pt at this time given pt unlikely to want chemo.      To-Do:  [ ] uptitration of GDMT meds given STEMI, new reduced EF in setting of ischemic cardiomyopathy; f/u cardiology recs   [ ] f/u S/S eval recs   [ ] f/u BCx, UA; on zosyn 4/13 - (previously on CTX 4/11-4/13)    [ ] Consider nonemergent dedicated adrenal CT or MRI for further evaluation for L adrenal nodule   [ ] consider outpt EGD to eval gastric nodule, but per family discussion: will withhold that information from pt for now as he is unlikely to want chemotherapy     OBJECTIVE --  Vital Signs Last 24 Hrs  T(C): 37.9 (14 Apr 2024 08:00), Max: 38.6 (13 Apr 2024 16:00)  T(F): 100.2 (14 Apr 2024 08:00), Max: 101.5 (13 Apr 2024 16:00)  HR: 88 (14 Apr 2024 11:15) (73 - 99)  BP: 179/86 (14 Apr 2024 11:15) (92/54 - 205/93)  BP(mean): 123 (14 Apr 2024 11:15) (67 - 134)  RR: 33 (14 Apr 2024 11:15) (22 - 47)  SpO2: 97% (14 Apr 2024 11:15) (92% - 100%)    Parameters below as of 14 Apr 2024 11:05  Patient On (Oxygen Delivery Method): nasal cannula        I&O's Summary    13 Apr 2024 07:01  -  14 Apr 2024 07:00  --------------------------------------------------------  IN: 2118 mL / OUT: 475 mL / NET: 1643 mL    14 Apr 2024 07:01  -  14 Apr 2024 12:07  --------------------------------------------------------  IN: 50 mL / OUT: 200 mL / NET: -150 mL        MEDICATIONS  (STANDING):  albuterol/ipratropium for Nebulization 3 milliLiter(s) Nebulizer every 6 hours  amLODIPine   Tablet 5 milliGRAM(s) Oral daily  aspirin  chewable 81 milliGRAM(s) Oral daily  atorvastatin 80 milliGRAM(s) Oral every 24 hours  bisacodyl Suppository 10 milliGRAM(s) Rectal daily  chlorhexidine 4% Liquid 1 Application(s) Topical <User Schedule>  clopidogrel Tablet 75 milliGRAM(s) Enteral Tube daily  fluticasone propionate 50 MICROgram(s)/spray Nasal Spray 1 Spray(s) Both Nostrils two times a day  metoprolol tartrate 25 milliGRAM(s) Oral every 12 hours  pantoprazole  Injectable 40 milliGRAM(s) IV Push daily  piperacillin/tazobactam IVPB.. 3.375 Gram(s) IV Intermittent every 8 hours  polyethylene glycol 3350 17 Gram(s) Oral daily  sacubitril 24 mG/valsartan 26 mG 1 Tablet(s) Oral two times a day  senna 1 Tablet(s) Oral daily  senna Syrup 10 milliLiter(s) Oral at bedtime  sodium chloride 0.65% Nasal 1 Spray(s) Both Nostrils two times a day    MEDICATIONS  (PRN):        LABS                                            12.1                  Neurophils% (auto):   85.1   (04-14 @ 01:21):    7.03 )-----------(68           Lymphocytes% (auto):  9.7                                           35.7                   Eosinphils% (auto):   0.9      Manual%: Neutrophils x    ; Lymphocytes x    ; Eosinophils x    ; Bands%: x    ; Blasts x                                    139    |  106    |  32                  Calcium: 8.7   / iCa: x      (04-14 @ 01:21)    ----------------------------<  143       Magnesium: 2.2                              3.5     |  21     |  1.40             Phosphorous: 2.3      TPro  5.6    /  Alb  2.9    /  TBili  0.4    /  DBili  x      /  AST  22     /  ALT  16     /  AlkPhos  49     14 Apr 2024 01:21    ( 04-14 @ 01:21 )   PT: 12.5 sec;   INR: 1.14 ratio  aPTT: 23.2 sec          ASSESSMENT & PLAN:   89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 Now extubated as of 4/12.     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  - patient has history of hypertension   - on lisinopril and amlodipine at home   - diarrhea episodes resolved   - no history of heart failure, CAD, renal disease  - off of levo as of 4/12  - 4/13: mild orthostatic hypotension (symptomatic) x1 episode from bed to chair  - hold ACEi, started IVF hydration 100cc x10 hr D5/LR    Respiratory:   #Acute hypoxic respiratory failure  - on presentation, pt hypoxic to 80%, NRB with no improvement  - no history of heart failure or CAD   - CT chest suggestive of L sided PNA  - f/u legionella, strep (negative), RVP (negative)  - d/c  CTX, azithromycin  4/11 - 4/12  - started Zosyn i/s/o persistent fevers on 4/13   - chest PT, inspirometry, suctioning     Gastrointestinal:   #Gastroenteritis  - diarrheal episodes x 1 day duration after dinner  - CTAP consistent with colitis, GI PCR positive for EPEC - no indication for antibiotic treatment for that, but getting cross coverage with antibiotics for PNA     #Diet  - hoarseness this AM s/p extubation  - 4/13 improved, did not pass dysphagia screen  - f/u official speech and swallow eval    #change in BM  -  acute diarrhea i/s/o EPEC, now resolved  - h/o constipation, no bm x48 hours, started on dulcolax/senna for now     #hepatic cysts  - CT abd/pelv: Scattered hepatic cysts with largest measuring 2.4 x 3.1 cm.     #gastric nodule?  CT abd/pelv:  Small partially calcified nodule along the posterior wall of the gastric body, measuring 1.5 x 1.2 cm.  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 MICU Transfer Note  ---------------------------    Transfer from: MICU  Transfer to:  ( ) Medicine    ( x ) Telemetry    (  ) RCU    (  ) Palliative    (  ) Stroke Unit    (  ) _______________  Accepting Physician: Dr Edna Mcintosh      MICU COURSE    89 yo M with h/o HTN and benign postural vertigo presented 2 to diffuse watery diarrhea x 1 day duration. Diarrheal episodes and non-bloody, non-bilious emesis started after having vegetable soup and turkey sandwich for dinner. In the ED, noted to be hypoxic with crackles and wheezing, hypoxic to 80% and subsequently intubated. CTC showed scattered bilateral ground-glass opacities and left lower lobe consolidation, concerning for pneumonia. CTAP showed colitis involving the ascending, transverse and proximal descending colon with partially calcified nodule along the posterior gastric wall, concerning for neoplasm. Endoscopy suggested for further evaluation; also CTAP found Left adrenal 9 mm hypodense nodule. While in the CT scan, became acutely hypotensive (felt to be 2 to Propofol); Rx'd with Epi. Noted to have ST elevation on monitor and EKG which alleged improved with improvement of BP. Cardiology consulted, suspects likely 2/2 demand and possible ischemic component. Started on CTX/azithromycin 4/11-4/13, but due to persistent fevers (BCx NGTD, UA negative), broadened to zosyn 4/13-. Cardiac enzymes continued to rise and EKG showing Q-wave - cardiology still recommending conservative management. C/b thrombocytopenia, Hep PF4 Ab negative. Pending S/S eval given failed dysphagia screen. Family discussion today informed of CTAP gastric wall nodule - family wishes to withhold that information from pt at this time given pt unlikely to want chemo.      To-Do:  [ ] started flomax today, will need TOV prior to dc  [ ] uptitration of GDMT meds given STEMI, new reduced EF in setting of ischemic cardiomyopathy; f/u cardiology recs   [ ] Consider nonemergent dedicated adrenal CT or MRI for further evaluation for L adrenal nodule   [ ] consider outpt EGD to eval gastric nodule, but per family discussion: will withhold that information from pt for now as he is unlikely to want chemotherapy     OBJECTIVE --  Vital Signs Last 24 Hrs  T(C): 37.9 (14 Apr 2024 08:00), Max: 38.6 (13 Apr 2024 16:00)  T(F): 100.2 (14 Apr 2024 08:00), Max: 101.5 (13 Apr 2024 16:00)  HR: 88 (14 Apr 2024 11:15) (73 - 99)  BP: 179/86 (14 Apr 2024 11:15) (92/54 - 205/93)  BP(mean): 123 (14 Apr 2024 11:15) (67 - 134)  RR: 33 (14 Apr 2024 11:15) (22 - 47)  SpO2: 97% (14 Apr 2024 11:15) (92% - 100%)    Parameters below as of 14 Apr 2024 11:05  Patient On (Oxygen Delivery Method): nasal cannula        I&O's Summary    13 Apr 2024 07:01  -  14 Apr 2024 07:00  --------------------------------------------------------  IN: 2118 mL / OUT: 475 mL / NET: 1643 mL    14 Apr 2024 07:01  -  14 Apr 2024 12:07  --------------------------------------------------------  IN: 50 mL / OUT: 200 mL / NET: -150 mL        MEDICATIONS  (STANDING):  albuterol/ipratropium for Nebulization 3 milliLiter(s) Nebulizer every 6 hours  amLODIPine   Tablet 5 milliGRAM(s) Oral daily  aspirin  chewable 81 milliGRAM(s) Oral daily  atorvastatin 80 milliGRAM(s) Oral every 24 hours  bisacodyl Suppository 10 milliGRAM(s) Rectal daily  chlorhexidine 4% Liquid 1 Application(s) Topical <User Schedule>  clopidogrel Tablet 75 milliGRAM(s) Enteral Tube daily  fluticasone propionate 50 MICROgram(s)/spray Nasal Spray 1 Spray(s) Both Nostrils two times a day  metoprolol tartrate 25 milliGRAM(s) Oral every 12 hours  pantoprazole  Injectable 40 milliGRAM(s) IV Push daily  piperacillin/tazobactam IVPB.. 3.375 Gram(s) IV Intermittent every 8 hours  polyethylene glycol 3350 17 Gram(s) Oral daily  sacubitril 24 mG/valsartan 26 mG 1 Tablet(s) Oral two times a day  senna 1 Tablet(s) Oral daily  senna Syrup 10 milliLiter(s) Oral at bedtime  sodium chloride 0.65% Nasal 1 Spray(s) Both Nostrils two times a day    MEDICATIONS  (PRN):        LABS                                            12.1                  Neurophils% (auto):   85.1   (04-14 @ 01:21):    7.03 )-----------(68           Lymphocytes% (auto):  9.7                                           35.7                   Eosinphils% (auto):   0.9      Manual%: Neutrophils x    ; Lymphocytes x    ; Eosinophils x    ; Bands%: x    ; Blasts x                                    139    |  106    |  32                  Calcium: 8.7   / iCa: x      (04-14 @ 01:21)    ----------------------------<  143       Magnesium: 2.2                              3.5     |  21     |  1.40             Phosphorous: 2.3      TPro  5.6    /  Alb  2.9    /  TBili  0.4    /  DBili  x      /  AST  22     /  ALT  16     /  AlkPhos  49     14 Apr 2024 01:21    ( 04-14 @ 01:21 )   PT: 12.5 sec;   INR: 1.14 ratio  aPTT: 23.2 sec          ASSESSMENT & PLAN:   89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 Now extubated as of 4/12.     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  - patient has history of hypertension   - on lisinopril and amlodipine at home   - diarrhea episodes resolved   - no history of heart failure, CAD, renal disease  - off of levo as of 4/12  - 4/13: mild orthostatic hypotension (symptomatic) x1 episode from bed to chair  - hold ACEi, started IVF hydration 100cc x10 hr D5/LR    Respiratory:   #Acute hypoxic respiratory failure  - on presentation, pt hypoxic to 80%, NRB with no improvement  - no history of heart failure or CAD   - CT chest suggestive of L sided PNA  - f/u legionella, strep (negative), RVP (negative)  - d/c  CTX, azithromycin  4/11 - 4/12  - started Zosyn i/s/o persistent fevers on 4/13   - chest PT, inspirometry, suctioning     Gastrointestinal:   #Gastroenteritis  - diarrheal episodes x 1 day duration after dinner  - CTAP consistent with colitis, GI PCR positive for EPEC - no indication for antibiotic treatment for that, but getting cross coverage with antibiotics for PNA     #Diet  - hoarseness this AM s/p extubation  - 4/13 improved, did not pass dysphagia screen  - f/u official speech and swallow eval    #change in BM  -  acute diarrhea i/s/o EPEC, now resolved  - h/o constipation, no bm x48 hours, started on dulcolax/senna for now     #hepatic cysts  - CT abd/pelv: Scattered hepatic cysts with largest measuring 2.4 x 3.1 cm.     #gastric nodule?  CT abd/pelv:  Small partially calcified nodule along the posterior wall of the gastric body, measuring 1.5 x 1.2 cm.  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

## 2024-04-14 NOTE — PROGRESS NOTE ADULT - ASSESSMENT
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 Now extubated as of 4/12. 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    #h/o HTN  #hypotension?  - patient has history of hypertension   - on lisinopril and amlodipine at home   - diarrhea episodes resolved   - no history of heart failure, CAD, renal disease  - off of levo as of 4/12  - 4/13: mild orthostatic hypotension (symptomatic) x1 episode from bed to chair  - hold ACEi, started IVF hydration 100cc x10 hr D5/LR  - could be in setting of persistent fevers and infection     Respiratory:   #Acute hypoxic respiratory failure  - on presentation, pt hypoxic to 80%, NRB with no improvement  - no history of heart failure or CAD   - CT chest suggestive of L sided PNA  - f/u legionella, strep (negative), RVP (negative)  - d/c  CTX, azithromycin  4/11 - 4/12  - started Zosyn i/s/o persistent fevers on 4/13   - chest PT, inspirometry, suctioning     Gastrointestinal:   #Gastroenteritis  - diarrheal episodes x 1 day duration after dinner  - CTAP consistent with colitis, GI PCR positive for EPEC - no indication for antibiotic treatment for that, but getting cross coverage with antibiotics for PNA     #Diet  - hoarseness this AM s/p extubation  - 4/13 improved, did not pass dysphagia screen  - ordered official speech and swallow as well     #change in BM  -  acute diarrhea i/s/o EPEC, now resolved  - h/o constipation, no bm x48 hours, on miralax and senna  - consider enema if no BM    #hepatic cysts  - CT abd/pelv: Scattered hepatic cysts with largest measuring 2.4 x 3.1 cm.     #gastric nodule?  CT abd/pelv:  Small partially calcified nodule along the posterior wall of the gastric body, measuring 1.5 x 1.2 cm.  - f/u with endoscopy for neoplasm r/o?    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 need to r/o MARCELLO (+new onset of low plt, +decreased in plt count by 50%, no known thrombosis, +acute systemic rxn (4/13) f/c/hypertension, fever) s/p hep gtt on 4/11   - f/u CAITLIN (if we have), YOON or HIPA  - consider heme consult and update cardio if concerned    DVT PPx:  - heparin gtt started on 4/11  - no necrosis at injection site  - baseline ecchymoisis   - evaluation for HIT (see heme above)    Ethics:  - Full code   Daughter: Ronda Chu - updated at bedside        89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 Now extubated as of 4/12.     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  - patient has history of hypertension   - on lisinopril and amlodipine at home   - diarrhea episodes resolved   - no history of heart failure, CAD, renal disease  - off of levo as of 4/12  - 4/13: mild orthostatic hypotension (symptomatic) x1 episode from bed to chair  - hold ACEi, started IVF hydration 100cc x10 hr D5/LR    Respiratory:   #Acute hypoxic respiratory failure  - on presentation, pt hypoxic to 80%, NRB with no improvement  - no history of heart failure or CAD   - CT chest suggestive of L sided PNA  - f/u legionella, strep (negative), RVP (negative)  - d/c  CTX, azithromycin  4/11 - 4/12  - started Zosyn i/s/o persistent fevers on 4/13   - chest PT, inspirometry, suctioning     Gastrointestinal:   #Gastroenteritis  - diarrheal episodes x 1 day duration after dinner  - CTAP consistent with colitis, GI PCR positive for EPEC - no indication for antibiotic treatment for that, but getting cross coverage with antibiotics for PNA     #Diet  - hoarseness this AM s/p extubation  - 4/13 improved, did not pass dysphagia screen  - f/u official speech and swallow eval    #change in BM  -  acute diarrhea i/s/o EPEC, now resolved  - h/o constipation, no bm x48 hours, started on dulcolax/senna for now     #hepatic cysts  - CT abd/pelv: Scattered hepatic cysts with largest measuring 2.4 x 3.1 cm.     #gastric nodule?  CT abd/pelv:  Small partially calcified nodule along the posterior wall of the gastric body, measuring 1.5 x 1.2 cm.  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        89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 Now extubated as of 4/12.     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  - patient has history of hypertension   - on lisinopril and amlodipine at home   - diarrhea episodes resolved   - no history of heart failure, CAD, renal disease  - off of levo as of 4/12  - 4/13: mild orthostatic hypotension (symptomatic) x1 episode from bed to chair  - hold ACEi, started IVF hydration 100cc x10 hr D5/LR    Respiratory:   #Acute hypoxic respiratory failure  - on presentation, pt hypoxic to 80%, NRB with no improvement  - no history of heart failure or CAD   - CT chest suggestive of L sided PNA  - f/u legionella, strep (negative), RVP (negative)  - d/c  CTX, azithromycin  4/11 - 4/12  - started Zosyn i/s/o persistent fevers on 4/13   - chest PT, inspirometry, suctioning     Gastrointestinal:   #Gastroenteritis  - diarrheal episodes x 1 day duration after dinner  - CTAP consistent with colitis, GI PCR positive for EPEC - no indication for antibiotic treatment for that, but getting cross coverage with antibiotics for PNA     #Diet  - hoarseness this AM s/p extubation  - 4/13 improved, did not pass dysphagia screen  - f/u official speech and swallow eval    #change in BM  -  acute diarrhea i/s/o EPEC, now resolved  - h/o constipation, no bm x48 hours, started on dulcolax/senna for now     #hepatic cysts  - CT abd/pelv: Scattered hepatic cysts with largest measuring 2.4 x 3.1 cm.     #gastric nodule?  CT abd/pelv:  Small partially calcified nodule along the posterior wall of the gastric body, measuring 1.5 x 1.2 cm.  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        89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 Now extubated as of 4/12.     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  - patient has history of hypertension   - on lisinopril and amlodipine at home   - diarrhea episodes resolved   - no history of heart failure, CAD, renal disease  - off of levo as of 4/12  - 4/13: mild orthostatic hypotension (symptomatic) x1 episode from bed to chair  - hold ACEi, started IVF hydration 100cc x10 hr D5/LR    Respiratory:   #Acute hypoxic respiratory failure  - on presentation, pt hypoxic to 80%, NRB with no improvement  - no history of heart failure or CAD   - CT chest suggestive of L sided PNA  - f/u legionella, strep (negative), RVP (negative)  - d/c  CTX, azithromycin  4/11 - 4/12  - started Zosyn i/s/o persistent fevers on 4/13   - chest PT, inspirometry, suctioning     Gastrointestinal:   #Gastroenteritis  - diarrheal episodes x 1 day duration after dinner  - CTAP consistent with colitis, GI PCR positive for EPEC - no indication for antibiotic treatment for that, but getting cross coverage with antibiotics for PNA     #Diet  - hoarseness this AM s/p extubation  - 4/13 improved, did not pass dysphagia screen  - f/u official speech and swallow eval    #change in BM  -  acute diarrhea i/s/o EPEC, now resolved  - h/o constipation, no bm x48 hours, started on dulcolax/senna for now     #hepatic cysts  - CT abd/pelv: Scattered hepatic cysts with largest measuring 2.4 x 3.1 cm.     #gastric nodule?  CT abd/pelv:  Small partially calcified nodule along the posterior wall of the gastric body, measuring 1.5 x 1.2 cm.  - can f/u with endoscopy for neoplasm r/o outpt BUT discussion with family at bedside (without pt): would prefer to not tell pt about gastric lesion given pt unlikely to accept chemo and knowledge of lesion is more likely to cause distress to pt    Renal/:   #HERBERT   - Scr 1.48 --> improved to 1.19  - unknown baseline,   - possible in setting of hypovolemia   - will continue to monitor   - trial mckee removal tonight, 4/13    ID:   #PNA, colitis (EPEC positive)  - BCx 4/10 NGTD x2, UCx neg 4/10  - repeat BCx 4/13 for recurrent, persistent fevers  - f/u repeat BCx/sputum cx from 4/13 ___  - on zosyn as of 4/13 -     Endo:   - No active issues    Heme:     #thrombocytopenia   - 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

## 2024-04-15 LAB
ALBUMIN SERPL ELPH-MCNC: 3.1 G/DL — LOW (ref 3.3–5)
ALBUMIN SERPL ELPH-MCNC: 3.3 G/DL — SIGNIFICANT CHANGE UP (ref 3.3–5)
ALP SERPL-CCNC: 62 U/L — SIGNIFICANT CHANGE UP (ref 40–120)
ALP SERPL-CCNC: 62 U/L — SIGNIFICANT CHANGE UP (ref 40–120)
ALT FLD-CCNC: 17 U/L — SIGNIFICANT CHANGE UP (ref 10–45)
ALT FLD-CCNC: 18 U/L — SIGNIFICANT CHANGE UP (ref 10–45)
ANION GAP SERPL CALC-SCNC: 11 MMOL/L — SIGNIFICANT CHANGE UP (ref 5–17)
ANION GAP SERPL CALC-SCNC: 12 MMOL/L — SIGNIFICANT CHANGE UP (ref 5–17)
APTT BLD: 19.4 SEC — LOW (ref 24.5–35.6)
AST SERPL-CCNC: 23 U/L — SIGNIFICANT CHANGE UP (ref 10–40)
AST SERPL-CCNC: 24 U/L — SIGNIFICANT CHANGE UP (ref 10–40)
BASOPHILS # BLD AUTO: 0.01 K/UL — SIGNIFICANT CHANGE UP (ref 0–0.2)
BASOPHILS NFR BLD AUTO: 0.1 % — SIGNIFICANT CHANGE UP (ref 0–2)
BILIRUB SERPL-MCNC: 1 MG/DL — SIGNIFICANT CHANGE UP (ref 0.2–1.2)
BILIRUB SERPL-MCNC: 1 MG/DL — SIGNIFICANT CHANGE UP (ref 0.2–1.2)
BUN SERPL-MCNC: 33 MG/DL — HIGH (ref 7–23)
BUN SERPL-MCNC: 34 MG/DL — HIGH (ref 7–23)
CALCIUM SERPL-MCNC: 9.7 MG/DL — SIGNIFICANT CHANGE UP (ref 8.4–10.5)
CALCIUM SERPL-MCNC: 9.8 MG/DL — SIGNIFICANT CHANGE UP (ref 8.4–10.5)
CHLORIDE SERPL-SCNC: 109 MMOL/L — HIGH (ref 96–108)
CHLORIDE SERPL-SCNC: 109 MMOL/L — HIGH (ref 96–108)
CO2 SERPL-SCNC: 23 MMOL/L — SIGNIFICANT CHANGE UP (ref 22–31)
CO2 SERPL-SCNC: 23 MMOL/L — SIGNIFICANT CHANGE UP (ref 22–31)
CREAT SERPL-MCNC: 1.36 MG/DL — HIGH (ref 0.5–1.3)
CREAT SERPL-MCNC: 1.72 MG/DL — HIGH (ref 0.5–1.3)
EGFR: 37 ML/MIN/1.73M2 — LOW
EGFR: 49 ML/MIN/1.73M2 — LOW
EOSINOPHIL # BLD AUTO: 0.03 K/UL — SIGNIFICANT CHANGE UP (ref 0–0.5)
EOSINOPHIL NFR BLD AUTO: 0.4 % — SIGNIFICANT CHANGE UP (ref 0–6)
GLUCOSE BLDC GLUCOMTR-MCNC: 109 MG/DL — HIGH (ref 70–99)
GLUCOSE BLDC GLUCOMTR-MCNC: 130 MG/DL — HIGH (ref 70–99)
GLUCOSE BLDC GLUCOMTR-MCNC: 136 MG/DL — HIGH (ref 70–99)
GLUCOSE SERPL-MCNC: 115 MG/DL — HIGH (ref 70–99)
GLUCOSE SERPL-MCNC: 144 MG/DL — HIGH (ref 70–99)
HCT VFR BLD CALC: 37.9 % — LOW (ref 39–50)
HGB BLD-MCNC: 12.7 G/DL — LOW (ref 13–17)
IMM GRANULOCYTES NFR BLD AUTO: 0.7 % — SIGNIFICANT CHANGE UP (ref 0–0.9)
INR BLD: 1.14 RATIO — SIGNIFICANT CHANGE UP (ref 0.85–1.18)
LYMPHOCYTES # BLD AUTO: 0.75 K/UL — LOW (ref 1–3.3)
LYMPHOCYTES # BLD AUTO: 10.1 % — LOW (ref 13–44)
MAGNESIUM SERPL-MCNC: 2.1 MG/DL — SIGNIFICANT CHANGE UP (ref 1.6–2.6)
MAGNESIUM SERPL-MCNC: 2.2 MG/DL — SIGNIFICANT CHANGE UP (ref 1.6–2.6)
MCHC RBC-ENTMCNC: 29.9 PG — SIGNIFICANT CHANGE UP (ref 27–34)
MCHC RBC-ENTMCNC: 33.5 GM/DL — SIGNIFICANT CHANGE UP (ref 32–36)
MCV RBC AUTO: 89.2 FL — SIGNIFICANT CHANGE UP (ref 80–100)
MONOCYTES # BLD AUTO: 0.43 K/UL — SIGNIFICANT CHANGE UP (ref 0–0.9)
MONOCYTES NFR BLD AUTO: 5.8 % — SIGNIFICANT CHANGE UP (ref 2–14)
NEUTROPHILS # BLD AUTO: 6.17 K/UL — SIGNIFICANT CHANGE UP (ref 1.8–7.4)
NEUTROPHILS NFR BLD AUTO: 82.9 % — HIGH (ref 43–77)
NRBC # BLD: 0 /100 WBCS — SIGNIFICANT CHANGE UP (ref 0–0)
PHOSPHATE SERPL-MCNC: 2.3 MG/DL — LOW (ref 2.5–4.5)
PHOSPHATE SERPL-MCNC: 3.1 MG/DL — SIGNIFICANT CHANGE UP (ref 2.5–4.5)
PLATELET # BLD AUTO: 88 K/UL — LOW (ref 150–400)
POTASSIUM SERPL-MCNC: 3.5 MMOL/L — SIGNIFICANT CHANGE UP (ref 3.5–5.3)
POTASSIUM SERPL-MCNC: 3.6 MMOL/L — SIGNIFICANT CHANGE UP (ref 3.5–5.3)
POTASSIUM SERPL-SCNC: 3.5 MMOL/L — SIGNIFICANT CHANGE UP (ref 3.5–5.3)
POTASSIUM SERPL-SCNC: 3.6 MMOL/L — SIGNIFICANT CHANGE UP (ref 3.5–5.3)
PROT SERPL-MCNC: 6 G/DL — SIGNIFICANT CHANGE UP (ref 6–8.3)
PROT SERPL-MCNC: 6.3 G/DL — SIGNIFICANT CHANGE UP (ref 6–8.3)
PROTHROM AB SERPL-ACNC: 11.9 SEC — SIGNIFICANT CHANGE UP (ref 9.5–13)
RBC # BLD: 4.25 M/UL — SIGNIFICANT CHANGE UP (ref 4.2–5.8)
RBC # FLD: 13.9 % — SIGNIFICANT CHANGE UP (ref 10.3–14.5)
SODIUM SERPL-SCNC: 143 MMOL/L — SIGNIFICANT CHANGE UP (ref 135–145)
SODIUM SERPL-SCNC: 144 MMOL/L — SIGNIFICANT CHANGE UP (ref 135–145)
WBC # BLD: 7.44 K/UL — SIGNIFICANT CHANGE UP (ref 3.8–10.5)
WBC # FLD AUTO: 7.44 K/UL — SIGNIFICANT CHANGE UP (ref 3.8–10.5)

## 2024-04-15 PROCEDURE — 99233 SBSQ HOSP IP/OBS HIGH 50: CPT | Mod: GC

## 2024-04-15 RX ORDER — TAMSULOSIN HYDROCHLORIDE 0.4 MG/1
0.4 CAPSULE ORAL AT BEDTIME
Refills: 0 | Status: DISCONTINUED | OUTPATIENT
Start: 2024-04-15 | End: 2024-04-22

## 2024-04-15 RX ORDER — POTASSIUM CHLORIDE 20 MEQ
20 PACKET (EA) ORAL ONCE
Refills: 0 | Status: COMPLETED | OUTPATIENT
Start: 2024-04-15 | End: 2024-04-15

## 2024-04-15 RX ORDER — HEPARIN SODIUM 5000 [USP'U]/ML
5000 INJECTION INTRAVENOUS; SUBCUTANEOUS EVERY 8 HOURS
Refills: 0 | Status: DISCONTINUED | OUTPATIENT
Start: 2024-04-15 | End: 2024-04-22

## 2024-04-15 RX ORDER — POTASSIUM CHLORIDE 20 MEQ
40 PACKET (EA) ORAL ONCE
Refills: 0 | Status: DISCONTINUED | OUTPATIENT
Start: 2024-04-15 | End: 2024-04-15

## 2024-04-15 RX ORDER — TAMSULOSIN HYDROCHLORIDE 0.4 MG/1
0.4 CAPSULE ORAL AT BEDTIME
Refills: 0 | Status: DISCONTINUED | OUTPATIENT
Start: 2024-04-15 | End: 2024-04-15

## 2024-04-15 RX ORDER — POTASSIUM CHLORIDE 20 MEQ
10 PACKET (EA) ORAL
Refills: 0 | Status: COMPLETED | OUTPATIENT
Start: 2024-04-15 | End: 2024-04-15

## 2024-04-15 RX ORDER — SODIUM,POTASSIUM PHOSPHATES 278-250MG
2 POWDER IN PACKET (EA) ORAL ONCE
Refills: 0 | Status: DISCONTINUED | OUTPATIENT
Start: 2024-04-15 | End: 2024-04-15

## 2024-04-15 RX ORDER — POTASSIUM PHOSPHATE, MONOBASIC POTASSIUM PHOSPHATE, DIBASIC 236; 224 MG/ML; MG/ML
15 INJECTION, SOLUTION INTRAVENOUS ONCE
Refills: 0 | Status: COMPLETED | OUTPATIENT
Start: 2024-04-15 | End: 2024-04-15

## 2024-04-15 RX ADMIN — Medication 1 SPRAY(S): at 17:07

## 2024-04-15 RX ADMIN — PIPERACILLIN AND TAZOBACTAM 25 GRAM(S): 4; .5 INJECTION, POWDER, LYOPHILIZED, FOR SOLUTION INTRAVENOUS at 13:21

## 2024-04-15 RX ADMIN — Medication 20 MILLIEQUIVALENT(S): at 15:26

## 2024-04-15 RX ADMIN — Medication 3 MILLILITER(S): at 17:18

## 2024-04-15 RX ADMIN — Medication 25 MILLIGRAM(S): at 06:04

## 2024-04-15 RX ADMIN — Medication 3 MILLILITER(S): at 11:43

## 2024-04-15 RX ADMIN — CHLORHEXIDINE GLUCONATE 1 APPLICATION(S): 213 SOLUTION TOPICAL at 06:05

## 2024-04-15 RX ADMIN — TAMSULOSIN HYDROCHLORIDE 0.4 MILLIGRAM(S): 0.4 CAPSULE ORAL at 21:00

## 2024-04-15 RX ADMIN — PIPERACILLIN AND TAZOBACTAM 25 GRAM(S): 4; .5 INJECTION, POWDER, LYOPHILIZED, FOR SOLUTION INTRAVENOUS at 21:00

## 2024-04-15 RX ADMIN — Medication 3 MILLIGRAM(S): at 21:00

## 2024-04-15 RX ADMIN — ATORVASTATIN CALCIUM 80 MILLIGRAM(S): 80 TABLET, FILM COATED ORAL at 11:03

## 2024-04-15 RX ADMIN — Medication 100 MILLIEQUIVALENT(S): at 04:08

## 2024-04-15 RX ADMIN — POTASSIUM PHOSPHATE, MONOBASIC POTASSIUM PHOSPHATE, DIBASIC 62.5 MILLIMOLE(S): 236; 224 INJECTION, SOLUTION INTRAVENOUS at 15:27

## 2024-04-15 RX ADMIN — SACUBITRIL AND VALSARTAN 1 TABLET(S): 24; 26 TABLET, FILM COATED ORAL at 06:05

## 2024-04-15 RX ADMIN — HEPARIN SODIUM 5000 UNIT(S): 5000 INJECTION INTRAVENOUS; SUBCUTANEOUS at 13:21

## 2024-04-15 RX ADMIN — Medication 1 SPRAY(S): at 17:03

## 2024-04-15 RX ADMIN — SACUBITRIL AND VALSARTAN 1 TABLET(S): 24; 26 TABLET, FILM COATED ORAL at 17:03

## 2024-04-15 RX ADMIN — PANTOPRAZOLE SODIUM 40 MILLIGRAM(S): 20 TABLET, DELAYED RELEASE ORAL at 11:35

## 2024-04-15 RX ADMIN — PIPERACILLIN AND TAZOBACTAM 25 GRAM(S): 4; .5 INJECTION, POWDER, LYOPHILIZED, FOR SOLUTION INTRAVENOUS at 06:05

## 2024-04-15 RX ADMIN — Medication 10 MILLILITER(S): at 13:19

## 2024-04-15 RX ADMIN — CLOPIDOGREL BISULFATE 75 MILLIGRAM(S): 75 TABLET, FILM COATED ORAL at 11:33

## 2024-04-15 RX ADMIN — Medication 100 MILLIEQUIVALENT(S): at 01:11

## 2024-04-15 RX ADMIN — HEPARIN SODIUM 5000 UNIT(S): 5000 INJECTION INTRAVENOUS; SUBCUTANEOUS at 21:00

## 2024-04-15 RX ADMIN — TAMSULOSIN HYDROCHLORIDE 0.4 MILLIGRAM(S): 0.4 CAPSULE ORAL at 09:20

## 2024-04-15 RX ADMIN — Medication 25 MILLIGRAM(S): at 17:03

## 2024-04-15 RX ADMIN — Medication 3 MILLILITER(S): at 05:01

## 2024-04-15 RX ADMIN — Medication 100 MILLIEQUIVALENT(S): at 02:11

## 2024-04-15 RX ADMIN — AMLODIPINE BESYLATE 5 MILLIGRAM(S): 2.5 TABLET ORAL at 06:04

## 2024-04-15 RX ADMIN — Medication 81 MILLIGRAM(S): at 06:05

## 2024-04-15 NOTE — PHYSICAL THERAPY INITIAL EVALUATION ADULT - ADDITIONAL COMMENTS
Pt lives alone in private home, 6steps to enter and additional 6 steps to bedroom; has bathroom near bedroom and on first floor; independent prior to admission with no AD; does not own DME except for grab bars in the bathroom; drives.

## 2024-04-15 NOTE — PROGRESS NOTE ADULT - SUBJECTIVE AND OBJECTIVE BOX
***************************************************************  Kassie Bond, PGY-2  Internal Medicine   ***************************************************************    INTERVAL HPI/OVERNIGHT EVENTS:    SUBJECTIVE: Patient seen and examined at bedside.       VITAL SIGNS:  ICU Vital Signs Last 24 Hrs  T(C): 37.1 (15 Apr 2024 04:00), Max: 37.9 (14 Apr 2024 08:00)  T(F): 98.8 (15 Apr 2024 04:00), Max: 100.2 (14 Apr 2024 08:00)  HR: 69 (15 Apr 2024 06:00) (65 - 99)  BP: 159/73 (15 Apr 2024 06:00) (106/53 - 205/93)  BP(mean): 105 (15 Apr 2024 06:00) (76 - 134)  RR: 24 (15 Apr 2024 06:00) (24 - 45)  SpO2: 100% (15 Apr 2024 06:00) (92% - 100%)    O2 Parameters below as of 15 Apr 2024 05:47  Patient On (Oxygen Delivery Method): nasal cannula    Plateau pressure:   P/F ratio:     04-14 @ 07:01  -  04-15 @ 07:00  --------------------------------------------------------  IN: 675 mL / OUT: 2205 mL / NET: -1530 mL      CAPILLARY BLOOD GLUCOSE:  POCT Blood Glucose.: 107 mg/dL (14 Apr 2024 17:53)    PHYSICAL EXAM:  General: NAD, resting in bed, on RA  HEENT: AT/NC, EOMI, PERRLA, clear conjunctiva and sclera  Lungs: clear to auscultation in anterior lung fields, no wheezes/rhonchi/rales  Heart: +s1/s2, RRR, no murmurs/gallops/rubs  Abdomen: soft, non-distended, non-tender to palpation, no rebound/guarding/rigidity, bowel sounds present  Extremities: +DP pulse bilaterally, no cyanosis/clubbing/edema      MEDICATIONS:  MEDICATIONS  (STANDING):  albuterol/ipratropium for Nebulization 3 milliLiter(s) Nebulizer every 6 hours  amLODIPine   Tablet 5 milliGRAM(s) Oral daily  aspirin  chewable 81 milliGRAM(s) Oral daily  atorvastatin 80 milliGRAM(s) Oral every 24 hours  bisacodyl Suppository 10 milliGRAM(s) Rectal daily  chlorhexidine 4% Liquid 1 Application(s) Topical <User Schedule>  clopidogrel Tablet 75 milliGRAM(s) Enteral Tube daily  fluticasone propionate 50 MICROgram(s)/spray Nasal Spray 1 Spray(s) Both Nostrils two times a day  lidocaine 2% Jelly 10 milliLiter(s) IntraUrethral daily  melatonin 3 milliGRAM(s) Oral at bedtime  metoprolol tartrate 25 milliGRAM(s) Oral every 12 hours  pantoprazole  Injectable 40 milliGRAM(s) IV Push daily  piperacillin/tazobactam IVPB.. 3.375 Gram(s) IV Intermittent every 8 hours  polyethylene glycol 3350 17 Gram(s) Oral daily  sacubitril 24 mG/valsartan 26 mG 1 Tablet(s) Oral two times a day  senna 1 Tablet(s) Oral daily  sodium chloride 0.65% Nasal 1 Spray(s) Both Nostrils two times a day    ALLERGIES:  iodine (Other)    LABS:                        12.7   7.44  )-----------( 88       ( 15 Apr 2024 00:21 )             37.9     04-15    144  |  109<H>  |  33<H>  ----------------------------<  115<H>  3.5   |  23  |  1.72<H>    Ca    9.7      15 Apr 2024 00:21  Phos  3.1     04-15  Mg     2.2     04-15    TPro  6.3  /  Alb  3.3  /  TBili  1.0  /  DBili  x   /  AST  24  /  ALT  18  /  AlkPhos  62  04-15    PT/INR - ( 15 Apr 2024 00:21 )   PT: 11.9 sec;   INR: 1.14 ratio      PTT - ( 15 Apr 2024 00:21 )  PTT:19.4 sec  Urinalysis Basic - ( 15 Apr 2024 00:21 )    Color: x / Appearance: x / SG: x / pH: x  Gluc: 115 mg/dL / Ketone: x  / Bili: x / Urobili: x   Blood: x / Protein: x / Nitrite: x   Leuk Esterase: x / RBC: x / WBC x   Sq Epi: x / Non Sq Epi: x / Bacteria: x    RADIOLOGY & ADDITIONAL TESTS: Reviewed. ***************************************************************  Kassie Bond, PGY-2  Internal Medicine   ***************************************************************    INTERVAL HPI/OVERNIGHT EVENTS: NAEON, tmax 100.1.    SUBJECTIVE: Patient seen and examined at bedside. Sitting on recliner. Denies CP, SOB, abdominal pain. Had 1 soft BM this AM. No vomiting, feels hungry but waiting for S/S eval.     VITAL SIGNS:  ICU Vital Signs Last 24 Hrs  T(C): 37.1 (15 Apr 2024 04:00), Max: 37.9 (14 Apr 2024 08:00)  T(F): 98.8 (15 Apr 2024 04:00), Max: 100.2 (14 Apr 2024 08:00)  HR: 69 (15 Apr 2024 06:00) (65 - 99)  BP: 159/73 (15 Apr 2024 06:00) (106/53 - 205/93)  BP(mean): 105 (15 Apr 2024 06:00) (76 - 134)  RR: 24 (15 Apr 2024 06:00) (24 - 45)  SpO2: 100% (15 Apr 2024 06:00) (92% - 100%)    O2 Parameters below as of 15 Apr 2024 05:47  Patient On (Oxygen Delivery Method): nasal cannula    Plateau pressure:   P/F ratio:     04-14 @ 07:01  -  04-15 @ 07:00  --------------------------------------------------------  IN: 675 mL / OUT: 2205 mL / NET: -1530 mL      CAPILLARY BLOOD GLUCOSE:  POCT Blood Glucose.: 107 mg/dL (14 Apr 2024 17:53)    PHYSICAL EXAM:  General: NAD, resting in recliner, on NC  Lungs: clear to auscultation in anterior lung fields, no wheezes  Heart: +s1/s2, RRR, no murmurs/gallops/rubs  Abdomen: soft, non-distended, non-tender to palpation  Extremities: no peripheral edema bilaterally      MEDICATIONS:  MEDICATIONS  (STANDING):  albuterol/ipratropium for Nebulization 3 milliLiter(s) Nebulizer every 6 hours  amLODIPine   Tablet 5 milliGRAM(s) Oral daily  aspirin  chewable 81 milliGRAM(s) Oral daily  atorvastatin 80 milliGRAM(s) Oral every 24 hours  bisacodyl Suppository 10 milliGRAM(s) Rectal daily  chlorhexidine 4% Liquid 1 Application(s) Topical <User Schedule>  clopidogrel Tablet 75 milliGRAM(s) Enteral Tube daily  fluticasone propionate 50 MICROgram(s)/spray Nasal Spray 1 Spray(s) Both Nostrils two times a day  lidocaine 2% Jelly 10 milliLiter(s) IntraUrethral daily  melatonin 3 milliGRAM(s) Oral at bedtime  metoprolol tartrate 25 milliGRAM(s) Oral every 12 hours  pantoprazole  Injectable 40 milliGRAM(s) IV Push daily  piperacillin/tazobactam IVPB.. 3.375 Gram(s) IV Intermittent every 8 hours  polyethylene glycol 3350 17 Gram(s) Oral daily  sacubitril 24 mG/valsartan 26 mG 1 Tablet(s) Oral two times a day  senna 1 Tablet(s) Oral daily  sodium chloride 0.65% Nasal 1 Spray(s) Both Nostrils two times a day    ALLERGIES:  iodine (Other)    LABS:                        12.7   7.44  )-----------( 88       ( 15 Apr 2024 00:21 )             37.9     04-15    144  |  109<H>  |  33<H>  ----------------------------<  115<H>  3.5   |  23  |  1.72<H>    Ca    9.7      15 Apr 2024 00:21  Phos  3.1     04-15  Mg     2.2     04-15    TPro  6.3  /  Alb  3.3  /  TBili  1.0  /  DBili  x   /  AST  24  /  ALT  18  /  AlkPhos  62  04-15    PT/INR - ( 15 Apr 2024 00:21 )   PT: 11.9 sec;   INR: 1.14 ratio      PTT - ( 15 Apr 2024 00:21 )  PTT:19.4 sec  Urinalysis Basic - ( 15 Apr 2024 00:21 )    Color: x / Appearance: x / SG: x / pH: x  Gluc: 115 mg/dL / Ketone: x  / Bili: x / Urobili: x   Blood: x / Protein: x / Nitrite: x   Leuk Esterase: x / RBC: x / WBC x   Sq Epi: x / Non Sq Epi: x / Bacteria: x    RADIOLOGY & ADDITIONAL TESTS: Reviewed.

## 2024-04-15 NOTE — SWALLOW BEDSIDE ASSESSMENT ADULT - ASR SWALLOW ASPIRATION MONITOR
Monitor for s/s aspiration/laryngeal penetration. If noted:  D/C p.o. intake, provide non-oral nutrition/hydration/meds, and contact this service @ [phone]/change of breathing pattern/cough/gurgly voice/fever/pneumonia/throat clearing/upper respiratory infection

## 2024-04-15 NOTE — SWALLOW BEDSIDE ASSESSMENT ADULT - COMMENTS
Endoscopy suggested for further evaluation. 5) Left adrenal 9 mm hypodense nodule. Consider nonemergent dedicated adrenal CT or MRI for further evaluation and then became hypotensive (felt to be 2 to Propofol); Rx'd with Epi. Noted to have ST elevation on monitor and EKG which alleged improved with improvement of BP.   Transferred to MICU w ICU dx: 1) AHRF requiring intubation 2 to PNA 2) Septic shock 2 to PNA + Colitis 3) STEMI 4) HERBERT 2 to ATN.   Extubated 4/12

## 2024-04-15 NOTE — CHART NOTE - NSCHARTNOTEFT_GEN_A_CORE
Called re: patient's thrombocytopenia and concern for HIT, resident enquiring if heparin gtt for NSTEMI could be discontinued. Per chart review low concern of acute ischemic event in patient, EKG changes thought to be most likely due to demand ischemia. Heparin gtt had been recommended for 48 hours which had since passed. Agreed with discontinuation of heparin gtt given 48hrs therapy completed. Recommend Hematology consult for management of suspected HIT.     Kelly Rivera MD  PGY-4, Cardiology  Available on TEAMS    For all new consults  [email or web address]  Login: cheryl Overnight 4/13 called re: patient's thrombocytopenia and concern for HIT, resident enquiring if heparin gtt for NSTEMI could be discontinued. Per chart review low concern of acute ischemic event in patient, EKG changes thought to be most likely due to demand ischemia. Heparin gtt had been recommended for 48 hours which had since passed. Agreed with discontinuation of heparin gtt given 48hrs therapy completed. Recommend Hematology consult for management of suspected HIT.     Kelly Rivera MD  PGY-4, Cardiology  Available on TEAMS    For all new consults  [email or web address]  Login: cheryl

## 2024-04-15 NOTE — PHYSICAL THERAPY INITIAL EVALUATION ADULT - NSPTDMEREC_GEN_A_CORE
if home; RW, transport chair, 3-in-1 commode; Patient is confined to a single room without a bathroom and requires a commode to optimize independence and for a safe home discharge. Patient will require a rolling walker at home due to decreased balance, strength and endurance to help complete MRADL's (Mobility related activities for daily living)

## 2024-04-15 NOTE — SWALLOW BEDSIDE ASSESSMENT ADULT - SLP PERTINENT HISTORY OF CURRENT PROBLEM
89 yo WM with h/o HTN and benign postural vertigo presented 2 to diffuse watery diarrhea x 1 day duration. Diarrheal episodes and non-bloody, non-bilious emesis started after having vegetable soup and turkey sandwich for dinner. Pt reports multiple episodes of loose watery diarrhea, unable to control, and had an episode of watery stool in ER. Pt was noted to be hypoxic with crackles and wheezing, hypoxic to 80% on room air, requiring non-rebreather 10L, Pt subsequently continued to be hypoxic while on NRB, and subsequently intubated. Pt sent for CT (1) Scattered bilateral groundglass opacities and left lower lobe consolidation, concerning for pneumonia 2) Debris within the bilateral lower lobe bronchi and obliteration of the left lower lobe bronchus 3) No bowel obstruction. Colitis involving the ascending, transverse and proximal descending colon 4) Partially calcified nodule along the posterior gastric wall, concerning for neoplasm.

## 2024-04-15 NOTE — SWALLOW BEDSIDE ASSESSMENT ADULT - SWALLOW EVAL: DIAGNOSIS
89 yo admitted for diarrhea/vomiting, pneumonia and AHRF requiring intubation x1day; failed RN screen on large volume cup sips post extubation; c/o hoarse vocal quality, now resolved; has since been tolerating medications whole with sips of water; SpO2 96% on RA. Patient presents with 1)an overtly functional oropharyngeal swallow. Adequate oral management across all consistencies trialed. No overt signs of laryngeal penetration/aspiration on repeated trials. 2) Dysphonia resolved; Pt and family report vocal quality is now at baseline. Pt denies odynophagia; denies throat pain. Initiation of regular texture diet is recommended at this time. If there is further concern for aspiration, MBS or FEES may be considered.

## 2024-04-15 NOTE — PROGRESS NOTE ADULT - ASSESSMENT
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 Now extubated as of 4/12.     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  - patient has history of hypertension   - on lisinopril and amlodipine at home   - diarrhea episodes resolved   - no history of heart failure, CAD, renal disease  - off of levo as of 4/12  - 4/13: mild orthostatic hypotension (symptomatic) x1 episode from bed to chair  - hold ACEi, started IVF hydration 100cc x10 hr D5/LR    Respiratory:   #Acute hypoxic respiratory failure  - on presentation, pt hypoxic to 80%, NRB with no improvement  - no history of heart failure or CAD   - CT chest suggestive of L sided PNA  - f/u legionella, strep (negative), RVP (negative)  - d/c  CTX, azithromycin  4/11 - 4/12  - started Zosyn i/s/o persistent fevers on 4/13   - chest PT, inspirometry, suctioning     Gastrointestinal:   #Gastroenteritis  - diarrheal episodes x 1 day duration after dinner  - CTAP consistent with colitis, GI PCR positive for EPEC - no indication for antibiotic treatment for that, but getting cross coverage with antibiotics for PNA     #Diet  - hoarseness this AM s/p extubation  - 4/13 improved, did not pass dysphagia screen  - f/u official speech and swallow eval    #change in BM  -  acute diarrhea i/s/o EPEC, now resolved  - h/o constipation, no bm x48 hours, started on dulcolax/senna for now     #hepatic cysts  - CT abd/pelv: Scattered hepatic cysts with largest measuring 2.4 x 3.1 cm.     #gastric nodule?  CT abd/pelv:  Small partially calcified nodule along the posterior wall of the gastric body, measuring 1.5 x 1.2 cm.  - can f/u with endoscopy for neoplasm r/o outpt BUT discussion with family at bedside (without pt): would prefer to not tell pt about gastric lesion given pt unlikely to accept chemo and knowledge of lesion is more likely to cause distress to pt    Renal/:   #HERBERT   - Scr 1.48 --> improved to 1.19  - unknown baseline,   - possible in setting of hypovolemia   - will continue to monitor   - trial mckee removal tonight, 4/13    ID:   #PNA, colitis (EPEC positive)  - BCx 4/10 NGTD x2, UCx neg 4/10  - repeat BCx 4/13 for recurrent, persistent fevers  - f/u repeat BCx/sputum cx from 4/13 ___  - on zosyn as of 4/13 -     Endo:   - No active issues    Heme:     #thrombocytopenia   - 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        89M with history of HTN and BPPV presenting for emesis and diarrheal episodes x 1 day duration, was found to be in AHRF likely iso PNA requiring intubation 4/11-4/12. Course c/b MARY in inferolateral leads with reduced LVEF iso hypotension but currently HDS & tolerating GDMT, holding off on ischemic eval until management of current infxn.      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  - patient has history of hypertension   - on lisinopril and amlodipine at home   - diarrhea episodes resolved   - no history of heart failure, CAD, renal disease  - off of levo as of 4/12  - 4/13: mild orthostatic hypotension (symptomatic) x1 episode from bed to chair  - hold ACEi, started IVF hydration 100cc x10 hr D5/LR    Respiratory:   #Acute hypoxic respiratory failure  - on presentation, pt hypoxic to 80%, NRB with no improvement  - no history of heart failure or CAD   - CT chest suggestive of L sided PNA  - f/u legionella, strep (negative), RVP (negative)  - d/c  CTX, azithromycin  4/11 - 4/12  - started Zosyn i/s/o persistent fevers on 4/13   - chest PT, inspirometry, suctioning     Gastrointestinal:   #Gastroenteritis  - diarrheal episodes x 1 day duration after dinner  - CTAP consistent with colitis, GI PCR positive for EPEC - no indication for antibiotic treatment for that, but getting cross coverage with antibiotics for PNA     #Diet  - hoarseness this AM s/p extubation  - 4/13 improved, did not pass dysphagia screen  - f/u official speech and swallow eval    #change in BM  -  acute diarrhea i/s/o EPEC, now resolved  - h/o constipation, no bm x48 hours, started on dulcolax/senna for now     #hepatic cysts  - CT abd/pelv: Scattered hepatic cysts with largest measuring 2.4 x 3.1 cm.     #gastric nodule?  CT abd/pelv:  Small partially calcified nodule along the posterior wall of the gastric body, measuring 1.5 x 1.2 cm.  - can f/u with endoscopy for neoplasm r/o outpt BUT discussion with family at bedside (without pt): would prefer to not tell pt about gastric lesion given pt unlikely to accept chemo and knowledge of lesion is more likely to cause distress to pt    Renal/:   #HERBERT   - Scr 1.48 --> improved to 1.19  - unknown baseline,   - possible in setting of hypovolemia   - will continue to monitor   - trial mckee removal tonight, 4/13    ID:   #PNA, colitis (EPEC positive)  - BCx 4/10 NGTD x2, UCx neg 4/10  - repeat BCx 4/13 for recurrent, persistent fevers  - f/u repeat BCx/sputum cx from 4/13 ___  - on zosyn as of 4/13 -     Endo:   - No active issues    Heme:     #thrombocytopenia   - 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  89M with history of HTN and BPPV presenting for emesis and diarrheal episodes x 1 day duration, was found to be in AHRF likely iso PNA requiring intubation 4/11-4/12. Course c/b MARY in inferolateral leads with reduced LVEF iso hypotension but currently HDS & tolerating GDMT, holding off on ischemic eval until management of current infxn.      NEURO  - AAOx3 at baseline, currently no active issues  - R>L pupil dilation in ED  - CTH with chronic R arachnoid cyst, no acute findings    CARDIOVASCULAR  #MARY in inferolateral leads  #Reduced LVEF  - troponin elevation, peaked at 1300s on 4/14 with STEs in inferolateral leads  - TTE on 4/12 showing reduced LVEF, abnormal apex  - s/p heparin gtt for 48hrs  - s/p ASA & plavix load  - cardiology consulted - likely from brief hypotension but potentially also underlying ischemic disease  - no further ischemic eval until current illness resolves  - on GDMT with BB and entresto     RESPIRATORY  #AHRF  - on presentation, pt hypoxic to 80%, NRB with no improvement  - no history of heart failure or CAD   - CT chest suggestive of L sided PNA  - f/u legionella, strep (negative), RVP (negative)  - d/c  CTX, azithromycin  4/11 - 4/12  - started Zosyn i/s/o persistent fevers on 4/13   - chest PT, inspirometry, suctioning     Gastrointestinal:   #Gastroenteritis  - diarrheal episodes x 1 day duration after dinner  - CTAP consistent with colitis, GI PCR positive for EPEC - no indication for antibiotic treatment for that, but getting cross coverage with antibiotics for PNA     #Diet  - hoarseness this AM s/p extubation  - 4/13 improved, did not pass dysphagia screen  - f/u official speech and swallow eval    #change in BM  -  acute diarrhea i/s/o EPEC, now resolved  - h/o constipation, no bm x48 hours, started on dulcolax/senna for now     #hepatic cysts  - CT abd/pelv: Scattered hepatic cysts with largest measuring 2.4 x 3.1 cm.     #gastric nodule?  CT abd/pelv:  Small partially calcified nodule along the posterior wall of the gastric body, measuring 1.5 x 1.2 cm.  - can f/u with endoscopy for neoplasm r/o outpt BUT discussion with family at bedside (without pt): would prefer to not tell pt about gastric lesion given pt unlikely to accept chemo and knowledge of lesion is more likely to cause distress to pt    Renal/:   #HERBERT   - Scr 1.48 --> improved to 1.19  - unknown baseline,   - possible in setting of hypovolemia   - will continue to monitor   - trial mckee removal tonight, 4/13    ID:   #PNA, colitis (EPEC positive)  - BCx 4/10 NGTD x2, UCx neg 4/10  - repeat BCx 4/13 for recurrent, persistent fevers  - f/u repeat BCx/sputum cx from 4/13 ___  - on zosyn as of 4/13 -     Endo:   - No active issues    Heme:     #thrombocytopenia   - 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  89M with history of HTN and BPPV presenting for emesis and diarrheal episodes x 1 day duration, was found to be in AHRF likely iso PNA requiring intubation 4/11-4/12. Course c/b MARY in inferolateral leads with reduced LVEF iso hypotension but currently HDS & tolerating GDMT, holding off on ischemic eval until management of current infxn.      NEURO  - AAOx3 at baseline, currently no active issues  - R>L pupil dilation in ED  - CTH with chronic R arachnoid cyst, no acute findings    CARDIOVASCULAR  #MARY in inferolateral leads  #Reduced LVEF  - troponin elevation, peaked at 1300s on 4/14 with STEs in inferolateral leads  - TTE on 4/12 showing reduced LVEF, abnormal apex  - s/p heparin gtt for 48hrs  - s/p ASA & plavix load  - cards consulted - likely from brief hypoTN but potentially also underlying ischemic disease  - no further ischemic eval until current illness resolves  - on GDMT with BB and entresto     RESPIRATORY  #AHRF  - on presentation, pt hypoxic to 80%, NRB with no improvement  - no history of heart failure or CAD   - CT chest suggestive of L sided PNA  - f/u legionella, strep (negative), RVP (negative)  - d/c  CTX, azithromycin  4/11 - 4/12  - started Zosyn i/s/o persistent fevers on 4/13   - chest PT, inspirometry, suctioning     GASTROINTESTINAL  #Gastroenteritis  - diarrheal episodes x 1 day duration after dinner  - CTAP consistent with colitis, GI PCR positive for EPEC - no indication for antibiotic treatment for that, but getting cross coverage with antibiotics for PNA     #Diet  - hoarseness this AM s/p extubation  - 4/13 improved, did not pass dysphagia screen  - f/u official speech and swallow eval    #change in BM  -  acute diarrhea i/s/o EPEC, now resolved  - h/o constipation, no bm x48 hours, started on dulcolax/senna for now     #hepatic cysts  - CT abd/pelv: Scattered hepatic cysts with largest measuring 2.4 x 3.1 cm.     #gastric nodule?  CT abd/pelv:  Small partially calcified nodule along the posterior wall of the gastric body, measuring 1.5 x 1.2 cm.  - can f/u with endoscopy for neoplasm r/o outpt BUT discussion with family at bedside (without pt): would prefer to not tell pt about gastric lesion given pt unlikely to accept chemo and knowledge of lesion is more likely to cause distress to pt    RENAL/  #HERBERT   - Scr 1.48 --> improved to 1.19  - unknown baseline,   - possible in setting of hypovolemia   - will continue to monitor   - trial mckee removal tonight, 4/13    ID:   #PNA, colitis (EPEC positive)  - BCx 4/10 NGTD x2, UCx neg 4/10  - repeat BCx 4/13 for recurrent, persistent fevers  - f/u repeat BCx/sputum cx from 4/13 ___  - on zosyn as of 4/13 -     Endo:   - No active issues    Heme:     #thrombocytopenia   - 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  89M with history of HTN and BPPV presenting for emesis and diarrheal episodes x 1 day duration, was found to be in AHRF likely iso PNA requiring intubation 4/11-4/12. Course c/b MARY in inferolateral leads with reduced LVEF iso hypotension but currently HDS & tolerating GDMT, holding off on ischemic eval until management of current infxn.      NEURO  - AAOx3 at baseline, currently no active issues  - R>L pupil dilation in ED  - CTH with chronic R arachnoid cyst, no acute findings    CARDIOVASCULAR  #MARY in inferolateral leads  #Reduced LVEF  - troponin elevation, peaked at 1300s on 4/14 with STEs in inferolateral leads  - TTE on 4/12 showing reduced LVEF, abnormal apex  - s/p heparin gtt for 48hrs  - s/p ASA & plavix load  - cards consulted - likely from brief hypoTN but potentially also underlying ischemic disease  - no further ischemic eval until current illness resolves  - on GDMT with BB and entresto     RESPIRATORY  #AHRF, resolved  - on presentation, pt hypoxic to 80%, NRB with no improvement  - CT chest suggestive of L sided PNA  - legionella & RVP negative   - s/p CTX, azithromycin  4/11-4/12  - started zosyn d/t persistent fevers on 4/13, continue until 4/15 for 5 day course  - chest PT, inspirometry, suctioning     GASTROINTESTINAL  #Colitis  - p/w diarrhea & vomiting for 1 day duration  - CT AP showing colitis in transverse/ascending & prox descending  - GI PCR positive for EPEC  - supportive care, no need for abx tx    #Diet  - hoarseness this AM s/p extubation  - 4/13 improved, did not pass dysphagia screen  - f/u official speech and swallow eval    #change in BM  -  acute diarrhea i/s/o EPEC, now resolved  - h/o constipation, no bm x48 hours, started on dulcolax/senna for now     #hepatic cysts  - CT abd/pelv: Scattered hepatic cysts with largest measuring 2.4 x 3.1 cm.     #gastric nodule?  CT abd/pelv:  Small partially calcified nodule along the posterior wall of the gastric body, measuring 1.5 x 1.2 cm.  - can f/u with endoscopy for neoplasm r/o outpt BUT discussion with family at bedside (without pt): would prefer to not tell pt about gastric lesion given pt unlikely to accept chemo and knowledge of lesion is more likely to cause distress to pt    RENAL/  #HERBERT   - Scr 1.48 --> improved to 1.19  - unknown baseline,   - possible in setting of hypovolemia   - will continue to monitor   - trial mckee removal tonight, 4/13    ID:   #PNA, colitis (EPEC positive)  - BCx 4/10 NGTD x2, UCx neg 4/10  - repeat BCx 4/13 for recurrent, persistent fevers  - f/u repeat BCx/sputum cx from 4/13 ___  - on zosyn as of 4/13 -     Endo:   - No active issues    Heme:     #thrombocytopenia   - 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  89M with history of HTN and BPPV presenting for emesis and diarrheal episodes x 1 day duration, was found to be in AHRF likely iso PNA requiring intubation 4/11-4/12. Course c/b MARY in inferolateral leads with reduced LVEF iso hypotension but currently HDS & tolerating GDMT, holding off on ischemic eval until management of current infxn.      NEURO  - AAOx3 at baseline, currently no active issues  - R>L pupil dilation in ED  - CTH with chronic R arachnoid cyst, no acute findings    CARDIOVASCULAR  #MARY in inferolateral leads  #Reduced LVEF  - troponin elevation, peaked at 1300s on 4/14 with STEs in inferolateral leads  - TTE on 4/12 showing reduced LVEF, abnormal apex  - s/p heparin gtt for 48hrs  - s/p ASA & plavix load  - cards consulted - likely from brief hypoTN but potentially also underlying ischemic disease  - no further ischemic eval until current illness resolves  - on GDMT with BB and entresto     RESPIRATORY  #AHRF, resolved  - on presentation, pt hypoxic to 80%, NRB with no improvement  - CT chest suggestive of L sided PNA  - legionella & RVP negative   - s/p CTX, azithromycin  4/11-4/12  - started zosyn d/t persistent fevers on 4/13, continue until 4/15 for 5 day course  - chest PT, inspirometry, suctioning     GASTROINTESTINAL  #Colitis  - p/w diarrhea & vomiting for 1 day duration  - CT AP showing colitis in transverse/ascending & prox descending  - GI PCR positive for EPEC  - supportive care, no need for abx tx    #Gastric Nodule  #Hepatic Cysts  - CT AP: scattered hepatic cysts & small calcified nodule in gastric body  - f/u EGD for neoplasm recommended, however, family wishes to NOT tell this info to patient as he would likely not accept chemo & would cause more distress    #Diet  - passed S/S on 4/15, ordered for regular diet    RENAL/  #HERBERT   - Scr uptrending to 1.7, unknown baseline  - potentially related to entresto vs hypovolemia or both  - monitor for now given good UOP  - start flomax given hx of urinary retention  - remove mckee tomorrow pending initiation of flomax    ID  #LLL PNA  #Colitis  - blood cxs on 4/10 negative  - urine cxs on 4/10 negative  - repeat blood cxs on 4/13 for persistent fevers -- NGTD  - sputum cx normal respiratory jesus  - finish zosyn today to complete 5 day course of abx    ENDOCRINE  - no active issues    HEME  #Thrombocytopenia  - platelet itzel 60s, currently uptrending to 80s  - most likely sepsis related, HIT panel negative   - monitor for s/s of bleeding    #DVT ppx  - SQH q8h as low suspicion for HIT, plt >50, no s/s of bleeding    ETHICS  - full code  - daughter: Ronda Chu

## 2024-04-15 NOTE — PHYSICAL THERAPY INITIAL EVALUATION ADULT - PERTINENT HX OF CURRENT PROBLEM, REHAB EVAL
89y/o M with PMH of HTN, benign postural vertigo, p/w diffuse watery diarrhea x 1 day duration. Diarrheal episodes and non-bloody, non-bilious emesis started after having vegetable soup and turkey sandwich for dinner. Pt reports multiple episodes of loose watery diarrhea, unable to control, and had an episode of watery stool in ED. Pt was noted to be hypoxic with crackles and wheezing, hypoxic to 80% on room air, requiring non-rebreather 10L, pt denies any food or drug allergies, no recent medication changes, no antibiotics, fevers, or chills, no recent travels, no sick contacts, no rashes. Pt subsequently continued to be hypoxic while on NRB, and subsequently intubated while in emergency room. Pt currently admitted to MICU in the setting of acute hypoxic respiratory failure requiring intubation. CTHead: Age-appropriate involutional and ischemic gliotic changes. No hemorrhage. Right anterior temporal arachnoid cyst. No change since 6:32 AM. CTAbdomen/Pelvis: Scattered bilateral groundglass opacities and left lower lobe consolidation, concerning for pneumonia.Debris within the bilateral lower lobe bronchi and obliteration of the left lower lobe bronchus. No bowel obstruction. Colitis involving the ascending, transverse and proximal descending colon.Partially calcified nodule along the posterior gastric wall, concerning for neoplasm. Endoscopy suggested for further evaluation.Left adrenal 9 mm hypodense nodule. Consider nonemergent dedicated adrenal CT or MRI for further evaluation. CXR: Endotracheal tube terminates above the elvin.Mild pulmonary edema.Extubated: 4/12

## 2024-04-15 NOTE — PROGRESS NOTE ADULT - ATTENDING COMMENTS
1. Acute hypoxemic respiratory failure resolved. Pt extubated > 24 hrs. Currently on nasal canula . Will try to wean off./  2. LLL pneumonia. Finish Zosyn for total 5 days.  3. Colitis from EPEC. Clinically improving. Continue supportive care.  4. STEMI. Pt with new wall abnormalities , eCG changes and elevated troponin. Continue Plavix, ASA, metoprolol and Entresto. Follow creatinine closely.  5.Thrombocytopenia from sepsis. Slowly improving.  6.Gastic nodule and hepatic cysts.? malignancy. Family wishes no work up.  7. DVT prophylaxis. SQ heparin  8.HERBERT . creating up trending . May need to stop Entresto if creatinine continues to rise.  9. GOC; Full code.

## 2024-04-16 DIAGNOSIS — K52.9 NONINFECTIVE GASTROENTERITIS AND COLITIS, UNSPECIFIED: ICD-10-CM

## 2024-04-16 DIAGNOSIS — D69.6 THROMBOCYTOPENIA, UNSPECIFIED: ICD-10-CM

## 2024-04-16 DIAGNOSIS — J18.9 PNEUMONIA, UNSPECIFIED ORGANISM: ICD-10-CM

## 2024-04-16 DIAGNOSIS — R94.31 ABNORMAL ELECTROCARDIOGRAM [ECG] [EKG]: ICD-10-CM

## 2024-04-16 DIAGNOSIS — R33.9 RETENTION OF URINE, UNSPECIFIED: ICD-10-CM

## 2024-04-16 DIAGNOSIS — E27.8 OTHER SPECIFIED DISORDERS OF ADRENAL GLAND: ICD-10-CM

## 2024-04-16 DIAGNOSIS — K31.89 OTHER DISEASES OF STOMACH AND DUODENUM: ICD-10-CM

## 2024-04-16 DIAGNOSIS — Z29.9 ENCOUNTER FOR PROPHYLACTIC MEASURES, UNSPECIFIED: ICD-10-CM

## 2024-04-16 DIAGNOSIS — I10 ESSENTIAL (PRIMARY) HYPERTENSION: ICD-10-CM

## 2024-04-16 DIAGNOSIS — N17.9 ACUTE KIDNEY FAILURE, UNSPECIFIED: ICD-10-CM

## 2024-04-16 DIAGNOSIS — R09.89 OTHER SPECIFIED SYMPTOMS AND SIGNS INVOLVING THE CIRCULATORY AND RESPIRATORY SYSTEMS: ICD-10-CM

## 2024-04-16 LAB
ALBUMIN SERPL ELPH-MCNC: 3.1 G/DL — LOW (ref 3.3–5)
ALP SERPL-CCNC: 68 U/L — SIGNIFICANT CHANGE UP (ref 40–120)
ALT FLD-CCNC: 24 U/L — SIGNIFICANT CHANGE UP (ref 10–45)
ANION GAP SERPL CALC-SCNC: 12 MMOL/L — SIGNIFICANT CHANGE UP (ref 5–17)
AST SERPL-CCNC: 24 U/L — SIGNIFICANT CHANGE UP (ref 10–40)
BASOPHILS # BLD AUTO: 0.04 K/UL — SIGNIFICANT CHANGE UP (ref 0–0.2)
BASOPHILS NFR BLD AUTO: 0.6 % — SIGNIFICANT CHANGE UP (ref 0–2)
BILIRUB SERPL-MCNC: 0.7 MG/DL — SIGNIFICANT CHANGE UP (ref 0.2–1.2)
BUN SERPL-MCNC: 32 MG/DL — HIGH (ref 7–23)
CALCIUM SERPL-MCNC: 9.5 MG/DL — SIGNIFICANT CHANGE UP (ref 8.4–10.5)
CHLORIDE SERPL-SCNC: 111 MMOL/L — HIGH (ref 96–108)
CO2 SERPL-SCNC: 22 MMOL/L — SIGNIFICANT CHANGE UP (ref 22–31)
CREAT SERPL-MCNC: 1.2 MG/DL — SIGNIFICANT CHANGE UP (ref 0.5–1.3)
CULTURE RESULTS: SIGNIFICANT CHANGE UP
CULTURE RESULTS: SIGNIFICANT CHANGE UP
EGFR: 57 ML/MIN/1.73M2 — LOW
EOSINOPHIL # BLD AUTO: 0.15 K/UL — SIGNIFICANT CHANGE UP (ref 0–0.5)
EOSINOPHIL NFR BLD AUTO: 2.1 % — SIGNIFICANT CHANGE UP (ref 0–6)
GLUCOSE SERPL-MCNC: 116 MG/DL — HIGH (ref 70–99)
HCT VFR BLD CALC: 39.5 % — SIGNIFICANT CHANGE UP (ref 39–50)
HGB BLD-MCNC: 12.8 G/DL — LOW (ref 13–17)
IMM GRANULOCYTES NFR BLD AUTO: 1.7 % — HIGH (ref 0–0.9)
LYMPHOCYTES # BLD AUTO: 0.86 K/UL — LOW (ref 1–3.3)
LYMPHOCYTES # BLD AUTO: 12.2 % — LOW (ref 13–44)
MAGNESIUM SERPL-MCNC: 1.9 MG/DL — SIGNIFICANT CHANGE UP (ref 1.6–2.6)
MCHC RBC-ENTMCNC: 29.4 PG — SIGNIFICANT CHANGE UP (ref 27–34)
MCHC RBC-ENTMCNC: 32.4 GM/DL — SIGNIFICANT CHANGE UP (ref 32–36)
MCV RBC AUTO: 90.6 FL — SIGNIFICANT CHANGE UP (ref 80–100)
MONOCYTES # BLD AUTO: 0.51 K/UL — SIGNIFICANT CHANGE UP (ref 0–0.9)
MONOCYTES NFR BLD AUTO: 7.2 % — SIGNIFICANT CHANGE UP (ref 2–14)
NEUTROPHILS # BLD AUTO: 5.38 K/UL — SIGNIFICANT CHANGE UP (ref 1.8–7.4)
NEUTROPHILS NFR BLD AUTO: 76.2 % — SIGNIFICANT CHANGE UP (ref 43–77)
NRBC # BLD: 0 /100 WBCS — SIGNIFICANT CHANGE UP (ref 0–0)
PHOSPHATE SERPL-MCNC: 2.4 MG/DL — LOW (ref 2.5–4.5)
PLATELET # BLD AUTO: 92 K/UL — LOW (ref 150–400)
POTASSIUM SERPL-MCNC: 3.7 MMOL/L — SIGNIFICANT CHANGE UP (ref 3.5–5.3)
POTASSIUM SERPL-SCNC: 3.7 MMOL/L — SIGNIFICANT CHANGE UP (ref 3.5–5.3)
PROT SERPL-MCNC: 6 G/DL — SIGNIFICANT CHANGE UP (ref 6–8.3)
RBC # BLD: 4.36 M/UL — SIGNIFICANT CHANGE UP (ref 4.2–5.8)
RBC # FLD: 13.6 % — SIGNIFICANT CHANGE UP (ref 10.3–14.5)
SODIUM SERPL-SCNC: 145 MMOL/L — SIGNIFICANT CHANGE UP (ref 135–145)
SPECIMEN SOURCE: SIGNIFICANT CHANGE UP
SPECIMEN SOURCE: SIGNIFICANT CHANGE UP
WBC # BLD: 7.06 K/UL — SIGNIFICANT CHANGE UP (ref 3.8–10.5)
WBC # FLD AUTO: 7.06 K/UL — SIGNIFICANT CHANGE UP (ref 3.8–10.5)

## 2024-04-16 PROCEDURE — 99223 1ST HOSP IP/OBS HIGH 75: CPT | Mod: GC

## 2024-04-16 RX ORDER — METOPROLOL TARTRATE 50 MG
50 TABLET ORAL DAILY
Refills: 0 | Status: DISCONTINUED | OUTPATIENT
Start: 2024-04-16 | End: 2024-04-17

## 2024-04-16 RX ORDER — SACUBITRIL AND VALSARTAN 24; 26 MG/1; MG/1
1 TABLET, FILM COATED ORAL
Refills: 0 | Status: DISCONTINUED | OUTPATIENT
Start: 2024-04-16 | End: 2024-04-18

## 2024-04-16 RX ORDER — SODIUM,POTASSIUM PHOSPHATES 278-250MG
1 POWDER IN PACKET (EA) ORAL
Refills: 0 | Status: COMPLETED | OUTPATIENT
Start: 2024-04-16 | End: 2024-04-17

## 2024-04-16 RX ADMIN — Medication 3 MILLIGRAM(S): at 23:00

## 2024-04-16 RX ADMIN — CHLORHEXIDINE GLUCONATE 1 APPLICATION(S): 213 SOLUTION TOPICAL at 05:58

## 2024-04-16 RX ADMIN — CLOPIDOGREL BISULFATE 75 MILLIGRAM(S): 75 TABLET, FILM COATED ORAL at 11:41

## 2024-04-16 RX ADMIN — Medication 81 MILLIGRAM(S): at 05:49

## 2024-04-16 RX ADMIN — Medication 3 MILLILITER(S): at 17:12

## 2024-04-16 RX ADMIN — Medication 1 TABLET(S): at 17:08

## 2024-04-16 RX ADMIN — Medication 50 MILLIGRAM(S): at 11:40

## 2024-04-16 RX ADMIN — Medication 1 SPRAY(S): at 17:12

## 2024-04-16 RX ADMIN — AMLODIPINE BESYLATE 5 MILLIGRAM(S): 2.5 TABLET ORAL at 05:48

## 2024-04-16 RX ADMIN — HEPARIN SODIUM 5000 UNIT(S): 5000 INJECTION INTRAVENOUS; SUBCUTANEOUS at 05:49

## 2024-04-16 RX ADMIN — ATORVASTATIN CALCIUM 80 MILLIGRAM(S): 80 TABLET, FILM COATED ORAL at 11:40

## 2024-04-16 RX ADMIN — Medication 600 MILLIGRAM(S): at 17:08

## 2024-04-16 RX ADMIN — Medication 25 MILLIGRAM(S): at 05:48

## 2024-04-16 RX ADMIN — Medication 1 TABLET(S): at 22:40

## 2024-04-16 RX ADMIN — Medication 3 MILLILITER(S): at 05:48

## 2024-04-16 RX ADMIN — SACUBITRIL AND VALSARTAN 1 TABLET(S): 24; 26 TABLET, FILM COATED ORAL at 05:48

## 2024-04-16 RX ADMIN — Medication 1 SPRAY(S): at 05:48

## 2024-04-16 RX ADMIN — HEPARIN SODIUM 5000 UNIT(S): 5000 INJECTION INTRAVENOUS; SUBCUTANEOUS at 15:23

## 2024-04-16 RX ADMIN — TAMSULOSIN HYDROCHLORIDE 0.4 MILLIGRAM(S): 0.4 CAPSULE ORAL at 22:39

## 2024-04-16 RX ADMIN — HEPARIN SODIUM 5000 UNIT(S): 5000 INJECTION INTRAVENOUS; SUBCUTANEOUS at 22:40

## 2024-04-16 RX ADMIN — Medication 3 MILLILITER(S): at 12:02

## 2024-04-16 RX ADMIN — SACUBITRIL AND VALSARTAN 1 TABLET(S): 24; 26 TABLET, FILM COATED ORAL at 17:08

## 2024-04-16 NOTE — PROGRESS NOTE ADULT - ASSESSMENT
89-yo M PMHx of HTN, BPPV, presenting for emesis and diarrhea, found to be in AHRF 2/2 LLL PNA req. intubation 4/11-4/12, c/b MARY likely due to demand, now on 4L NC and downgraded to floors for monitoring, uptitration of GDMT, possible ischemic eval by cardiology.  89-yo M PMHx of HTN, BPPV, presenting for emesis and diarrhea, found to be in AHRF 2/2 LLL PNA req. intubation 4/11-4/12, c/b MARY likely due to demand, now on 4L NC and downgraded to floors for monitoring, uptitration of GDMT, and physical therapy.

## 2024-04-16 NOTE — PROGRESS NOTE ADULT - PROBLEM SELECTOR PLAN 3
- Pt w/ new LVEF w/o documented hx of HF.   - TTE w/ abnormal apex, LVEF mildly decreased.  - Continue metoprolol and entresto  - Cardiology consulted, appreciate recommendations re: uptitration of GDMT - Pt w/ new LVEF w/o documented hx of HF.   - TTE w/ abnormal apex, LVEF mildly decreased.  - Continue metoprolol and entresto. Uptitrate GDMT as tolerated  - Cardiology consulted, appreciate recs

## 2024-04-16 NOTE — PROGRESS NOTE ADULT - PROBLEM SELECTOR PLAN 7
- Pt w/ course c/b thrombocytopenia, HIT panel negative  - Off heparin for STEs, x48 hours.   - Suspect sepsis related  - CTM - Pt w/ course c/b thrombocytopenia, HIT panel negative  - Off heparin drip for STEs, x48 hours.   - Suspect sepsis related  - CTM, improving

## 2024-04-16 NOTE — PROGRESS NOTE ADULT - PROBLEM SELECTOR PLAN 1
- LLL PNA, intubated 4/11-4/12 for AHRF.  - s/p CTX, Azithro 4/11-4/12, Zosyn 4/13-4/15 for 5-day course  - Now on 4L NC  - Monitor off abx, wean O2 as tolerated - LLL PNA, intubated 4/11-4/12 for AHRF.  - s/p CTX, Azithro 4/11-4/12, Zosyn 4/13-4/15 for 5-day course  - Now on 4L NC  - Monitor off abx, wean O2 as tolerated  - Chest vest, Incetive spirometer, pulmonary toilet, PT, OOB to chair

## 2024-04-16 NOTE — PROGRESS NOTE ADULT - PROBLEM SELECTOR PLAN 11
DVT PPx: Heparin SubQ  Diet: DASH  Bowel Regimen: PRN  Code: Full  Dispo: PT/OT   Pharmacy:  PCP:   Communication: DVT PPx: Heparin SubQ  Diet: DASH  Bowel Regimen: PRN  Code: Full  Dispo: Per PT, RON  Pharmacy:  PCP:   Communication:

## 2024-04-16 NOTE — PROGRESS NOTE ADULT - PROBLEM SELECTOR PLAN 10
DVT PPx: Heparin SubQ  Diet: DASH  Bowel Regimen: PRN  Code: Full  Dispo: PT/OT   Pharmacy:  PCP:   Communication: - Continue amlodipine, entresto, metoprolol

## 2024-04-16 NOTE — PROGRESS NOTE ADULT - SUBJECTIVE AND OBJECTIVE BOX
PATIENT: MERNA CAZARES, MRN: [id number]    CHIEF COMPLAINT: Patient is a 89y old  Male who presents with a chief complaint of Hypoxia requiring intubation (16 Apr 2024 03:38)      INTERVAL HISTORY/OVERNIGHT EVENTS: No overnight events.       MEDICATIONS:  MEDICATIONS  (STANDING):  albuterol/ipratropium for Nebulization 3 milliLiter(s) Nebulizer every 6 hours  amLODIPine   Tablet 5 milliGRAM(s) Oral daily  aspirin  chewable 81 milliGRAM(s) Oral daily  atorvastatin 80 milliGRAM(s) Oral every 24 hours  bisacodyl Suppository 10 milliGRAM(s) Rectal daily  chlorhexidine 4% Liquid 1 Application(s) Topical <User Schedule>  clopidogrel Tablet 75 milliGRAM(s) Enteral Tube daily  fluticasone propionate 50 MICROgram(s)/spray Nasal Spray 1 Spray(s) Both Nostrils two times a day  heparin   Injectable 5000 Unit(s) SubCutaneous every 8 hours  lidocaine 2% Jelly 10 milliLiter(s) IntraUrethral daily  melatonin 3 milliGRAM(s) Oral at bedtime  metoprolol succinate ER 50 milliGRAM(s) Oral daily  polyethylene glycol 3350 17 Gram(s) Oral daily  sacubitril 49 mG/valsartan 51 mG 1 Tablet(s) Oral two times a day  senna 1 Tablet(s) Oral daily  sodium chloride 0.65% Nasal 1 Spray(s) Both Nostrils two times a day  tamsulosin 0.4 milliGRAM(s) Oral at bedtime    MEDICATIONS  (PRN):      ALLERGIES: Allergies    iodine (Other)    Intolerances        OBJECTIVE:  ICU Vital Signs Last 24 Hrs  T(C): 37.2 (16 Apr 2024 06:00), Max: 37.2 (15 Apr 2024 22:20)  T(F): 98.9 (16 Apr 2024 06:00), Max: 99 (15 Apr 2024 22:20)  HR: 60 (16 Apr 2024 06:00) (60 - 77)  BP: 163/86 (16 Apr 2024 06:00) (122/60 - 173/76)  BP(mean): 109 (15 Apr 2024 21:00) (83 - 109)  ABP: --  ABP(mean): --  RR: 18 (16 Apr 2024 06:00) (18 - 27)  SpO2: 95% (16 Apr 2024 06:00) (93% - 100%)    O2 Parameters below as of 16 Apr 2024 09:11  Patient On (Oxygen Delivery Method): nasal cannula            CAPILLARY BLOOD GLUCOSE      POCT Blood Glucose.: 130 mg/dL (15 Apr 2024 16:40)  POCT Blood Glucose.: 136 mg/dL (15 Apr 2024 13:12)    CAPILLARY BLOOD GLUCOSE      POCT Blood Glucose.: 130 mg/dL (15 Apr 2024 16:40)    I&O's Summary    15 Apr 2024 07:01  -  16 Apr 2024 07:00  --------------------------------------------------------  IN: 750 mL / OUT: 970 mL / NET: -220 mL      Daily     Daily     PHYSICAL EXAMINATION:  General: Comfortable, no acute distress, cooperative with exam.  HEENT: PERRLA  Respiratory: CTAB, normal respiratory effort, no coughing, wheezes, crackles, or rales.  CV: RRR, S1S2, no murmurs, rubs or gallops. No JVD. Distal pulses intact.  Abdominal: Soft, nontender, nondistended, no rebound or guarding, normal bowel sounds.  Neurology: AOx3, no focal neuro defects, LUNDBERG x 4.  Extremities: No pitting edema, + Peripheral pulses.      LABS:                          12.8   7.06  )-----------( 92       ( 16 Apr 2024 07:00 )             39.5     04-16    145  |  111<H>  |  32<H>  ----------------------------<  116<H>  3.7   |  22  |  1.20    Ca    9.5      16 Apr 2024 07:00  Phos  2.4     04-16  Mg     1.9     04-16    TPro  6.0  /  Alb  3.1<L>  /  TBili  0.7  /  DBili  x   /  AST  24  /  ALT  24  /  AlkPhos  68  04-16    LIVER FUNCTIONS - ( 16 Apr 2024 07:00 )  Alb: 3.1 g/dL / Pro: 6.0 g/dL / ALK PHOS: 68 U/L / ALT: 24 U/L / AST: 24 U/L / GGT: x           PT/INR - ( 15 Apr 2024 00:21 )   PT: 11.9 sec;   INR: 1.14 ratio         PTT - ( 15 Apr 2024 00:21 )  PTT:19.4 sec    CARDIAC MARKERS ( 14 Apr 2024 15:48 )  x     / x     / 170 U/L / x     / 5.3 ng/mL      Urinalysis Basic - ( 16 Apr 2024 07:00 )    Color: x / Appearance: x / SG: x / pH: x  Gluc: 116 mg/dL / Ketone: x  / Bili: x / Urobili: x   Blood: x / Protein: x / Nitrite: x   Leuk Esterase: x / RBC: x / WBC x   Sq Epi: x / Non Sq Epi: x / Bacteria: x       PATIENT: MERNA CAZARES, MRN: [id number]    CHIEF COMPLAINT: Patient is a 89y old  Male who presents with a chief complaint of Hypoxia requiring intubation (16 Apr 2024 03:38)      INTERVAL HISTORY/OVERNIGHT EVENTS: No overnight events. Doing well overall. No chest pain, no SOB. Does have phlegm, no fevers. Urinating now.      MEDICATIONS:  MEDICATIONS  (STANDING):  albuterol/ipratropium for Nebulization 3 milliLiter(s) Nebulizer every 6 hours  amLODIPine   Tablet 5 milliGRAM(s) Oral daily  aspirin  chewable 81 milliGRAM(s) Oral daily  atorvastatin 80 milliGRAM(s) Oral every 24 hours  bisacodyl Suppository 10 milliGRAM(s) Rectal daily  chlorhexidine 4% Liquid 1 Application(s) Topical <User Schedule>  clopidogrel Tablet 75 milliGRAM(s) Enteral Tube daily  fluticasone propionate 50 MICROgram(s)/spray Nasal Spray 1 Spray(s) Both Nostrils two times a day  heparin   Injectable 5000 Unit(s) SubCutaneous every 8 hours  lidocaine 2% Jelly 10 milliLiter(s) IntraUrethral daily  melatonin 3 milliGRAM(s) Oral at bedtime  metoprolol succinate ER 50 milliGRAM(s) Oral daily  polyethylene glycol 3350 17 Gram(s) Oral daily  sacubitril 49 mG/valsartan 51 mG 1 Tablet(s) Oral two times a day  senna 1 Tablet(s) Oral daily  sodium chloride 0.65% Nasal 1 Spray(s) Both Nostrils two times a day  tamsulosin 0.4 milliGRAM(s) Oral at bedtime    MEDICATIONS  (PRN):      ALLERGIES: Allergies    iodine (Other)    Intolerances        OBJECTIVE:  ICU Vital Signs Last 24 Hrs  T(C): 37.2 (16 Apr 2024 06:00), Max: 37.2 (15 Apr 2024 22:20)  T(F): 98.9 (16 Apr 2024 06:00), Max: 99 (15 Apr 2024 22:20)  HR: 60 (16 Apr 2024 06:00) (60 - 77)  BP: 163/86 (16 Apr 2024 06:00) (122/60 - 173/76)  BP(mean): 109 (15 Apr 2024 21:00) (83 - 109)  ABP: --  ABP(mean): --  RR: 18 (16 Apr 2024 06:00) (18 - 27)  SpO2: 95% (16 Apr 2024 06:00) (93% - 100%)    O2 Parameters below as of 16 Apr 2024 09:11  Patient On (Oxygen Delivery Method): nasal cannula            CAPILLARY BLOOD GLUCOSE      POCT Blood Glucose.: 130 mg/dL (15 Apr 2024 16:40)  POCT Blood Glucose.: 136 mg/dL (15 Apr 2024 13:12)    CAPILLARY BLOOD GLUCOSE      POCT Blood Glucose.: 130 mg/dL (15 Apr 2024 16:40)    I&O's Summary    15 Apr 2024 07:01  -  16 Apr 2024 07:00  --------------------------------------------------------  IN: 750 mL / OUT: 970 mL / NET: -220 mL      Daily     Daily     PHYSICAL EXAMINATION:  General: Comfortable, no acute distress, cooperative with exam.  Respiratory: upper airway sounds heard throughout, no crackles, no wheezes  CV: RRR, S1S2, no murmurs, rubs or gallops  Abdominal: Soft, nontender, nondistended, no rebound or guarding  Neurology: no focal neuro defects, LUNDBERG x 4.  Extremities: No pitting edema      LABS:                          12.8   7.06  )-----------( 92       ( 16 Apr 2024 07:00 )             39.5     04-16    145  |  111<H>  |  32<H>  ----------------------------<  116<H>  3.7   |  22  |  1.20    Ca    9.5      16 Apr 2024 07:00  Phos  2.4     04-16  Mg     1.9     04-16    TPro  6.0  /  Alb  3.1<L>  /  TBili  0.7  /  DBili  x   /  AST  24  /  ALT  24  /  AlkPhos  68  04-16    LIVER FUNCTIONS - ( 16 Apr 2024 07:00 )  Alb: 3.1 g/dL / Pro: 6.0 g/dL / ALK PHOS: 68 U/L / ALT: 24 U/L / AST: 24 U/L / GGT: x           PT/INR - ( 15 Apr 2024 00:21 )   PT: 11.9 sec;   INR: 1.14 ratio         PTT - ( 15 Apr 2024 00:21 )  PTT:19.4 sec    CARDIAC MARKERS ( 14 Apr 2024 15:48 )  x     / x     / 170 U/L / x     / 5.3 ng/mL      Urinalysis Basic - ( 16 Apr 2024 07:00 )    Color: x / Appearance: x / SG: x / pH: x  Gluc: 116 mg/dL / Ketone: x  / Bili: x / Urobili: x   Blood: x / Protein: x / Nitrite: x   Leuk Esterase: x / RBC: x / WBC x   Sq Epi: x / Non Sq Epi: x / Bacteria: x

## 2024-04-16 NOTE — PROGRESS NOTE ADULT - PROBLEM SELECTOR PLAN 7
- Pt w/ course c/b thrombocytopenia, HIT panel negative  - Off heparin for STEs, x48 hours.   - Suspect sepsis related  - CTM

## 2024-04-16 NOTE — PROGRESS NOTE ADULT - SUBJECTIVE AND OBJECTIVE BOX
***************************************************************  Dl (Hue) [facility] PGY2  Internal Medicine   ***************************************************************    MERNA CAZARES  89y  MRN: [id number]    89-yo M PMHx of HTN, BPPV, presenting for emesis and diarrhea, found to be in AHRF 2/2 LLL PNA req. intubation 4/11-4/12. MICU course notable for colitis and gastric wall nodule on CTAP w/ c/f neoplasm, left adrenal nodule, rec'd to get endoscopy. c/b inferior MARY w/ refused LVEF w/ hypotension, cardiology believing that it is due to demand and possible ischemic component, deferring ischemic eval pending infxn mgmt. Tx'd w/ Zosyn. Failed dysphagia screen pending S/S. Also c/b thrombocytopenia w/ negative PF4. Forde placed w/ Flomax started. Now stable and downgraded to floors.     Subjective:     MEDICATIONS  (STANDING):  albuterol/ipratropium for Nebulization 3 milliLiter(s) Nebulizer every 6 hours  amLODIPine   Tablet 5 milliGRAM(s) Oral daily  aspirin  chewable 81 milliGRAM(s) Oral daily  atorvastatin 80 milliGRAM(s) Oral every 24 hours  bisacodyl Suppository 10 milliGRAM(s) Rectal daily  chlorhexidine 4% Liquid 1 Application(s) Topical <User Schedule>  clopidogrel Tablet 75 milliGRAM(s) Enteral Tube daily  fluticasone propionate 50 MICROgram(s)/spray Nasal Spray 1 Spray(s) Both Nostrils two times a day  heparin   Injectable 5000 Unit(s) SubCutaneous every 8 hours  lidocaine 2% Jelly 10 milliLiter(s) IntraUrethral daily  melatonin 3 milliGRAM(s) Oral at bedtime  metoprolol tartrate 25 milliGRAM(s) Oral every 12 hours  polyethylene glycol 3350 17 Gram(s) Oral daily  sacubitril 24 mG/valsartan 26 mG 1 Tablet(s) Oral two times a day  senna 1 Tablet(s) Oral daily  sodium chloride 0.65% Nasal 1 Spray(s) Both Nostrils two times a day  tamsulosin 0.4 milliGRAM(s) Oral at bedtime    MEDICATIONS  (PRN):      Objective:    Vitals: Vital Signs Last 24 Hrs  T(C): 37.2 (04-15-24 @ 22:20), Max: 37.2 (04-15-24 @ 22:20)  T(F): 99 (04-15-24 @ 22:20), Max: 99 (04-15-24 @ 22:20)  HR: 70 (04-16-24 @ 01:00) (62 - 77)  BP: 166/67 (04-16-24 @ 01:00) (106/53 - 173/76)  BP(mean): 109 (04-15-24 @ 21:00) (76 - 109)  RR: 18 (04-16-24 @ 01:00) (18 - 33)  SpO2: 96% (04-16-24 @ 01:00) (93% - 100%)            I&O's Summary    14 Apr 2024 07:01  -  15 Apr 2024 07:00  --------------------------------------------------------  IN: 675 mL / OUT: 2305 mL / NET: -1630 mL    15 Apr 2024 07:01  -  16 Apr 2024 03:38  --------------------------------------------------------  IN: 750 mL / OUT: 970 mL / NET: -220 mL        PHYSICAL EXAM:  GENERAL: NAD  HEAD:  Atraumatic, Normocephalic  EYES: EOMI, conjunctiva and sclera clear  CHEST/LUNG: Clear to auscultation bilaterally; No rales, rhonchi, wheezing, or rubs  HEART: Regular rate and rhythm; No murmurs, rubs, or gallops  ABDOMEN: Soft, Nontender, Nondistended;   SKIN: No rashes or lesions  NERVOUS SYSTEM:  Alert & Oriented X3, no focal deficits    LABS:  04-15    143  |  109<H>  |  34<H>  ----------------------------<  144<H>  3.6   |  23  |  1.36<H>  04-15    144  |  109<H>  |  33<H>  ----------------------------<  115<H>  3.5   |  23  |  1.72<H>  04-14    139  |  106  |  32<H>  ----------------------------<  143<H>  3.5   |  21<L>  |  1.40<H>    Ca    9.8      15 Apr 2024 14:05  Ca    9.7      15 Apr 2024 00:21  Ca    8.7      14 Apr 2024 01:21  Phos  2.3     04-15  Mg     2.1     04-15    TPro  6.0  /  Alb  3.1<L>  /  TBili  1.0  /  DBili  x   /  AST  23  /  ALT  17  /  AlkPhos  62  04-15  TPro  6.3  /  Alb  3.3  /  TBili  1.0  /  DBili  x   /  AST  24  /  ALT  18  /  AlkPhos  62  04-15  TPro  5.6<L>  /  Alb  2.9<L>  /  TBili  0.4  /  DBili  x   /  AST  22  /  ALT  16  /  AlkPhos  49  04-14      PT/INR - ( 15 Apr 2024 00:21 )   PT: 11.9 sec;   INR: 1.14 ratio         PTT - ( 15 Apr 2024 00:21 )  PTT:19.4 sec              Urinalysis Basic - ( 15 Apr 2024 14:05 )    Color: x / Appearance: x / SG: x / pH: x  Gluc: 144 mg/dL / Ketone: x  / Bili: x / Urobili: x   Blood: x / Protein: x / Nitrite: x   Leuk Esterase: x / RBC: x / WBC x   Sq Epi: x / Non Sq Epi: x / Bacteria: x                              12.7   7.44  )-----------( 88       ( 15 Apr 2024 00:21 )             37.9                         12.1   7.03  )-----------( 68       ( 14 Apr 2024 01:21 )             35.7                         12.3   7.85  )-----------( 76       ( 13 Apr 2024 17:52 )             36.0     CAPILLARY BLOOD GLUCOSE      POCT Blood Glucose.: 130 mg/dL (15 Apr 2024 16:40)  POCT Blood Glucose.: 136 mg/dL (15 Apr 2024 13:12)  POCT Blood Glucose.: 109 mg/dL (15 Apr 2024 07:52)      RADIOLOGY & ADDITIONAL TESTS:    Imaging Personally Reviewed:  [x ] YES  [ ] NO    Consultants involved in case:   Consultant(s) Notes Reviewed:  [ x] YES  [ ] NO:   Care Discussed with Consultants/Other Providers [x ] YES  [ ] NO         ***************************************************************  Dl (Hue) [facility] PGY2  Internal Medicine   ***************************************************************    MERNA CAZARES  89y  MRN: [id number]    89-yo M PMHx of HTN, BPPV, presenting for emesis and diarrhea, found to be in AHRF 2/2 LLL PNA req. intubation 4/11-4/12. MICU course notable for colitis and gastric wall nodule on CTAP w/ c/f neoplasm, left adrenal nodule, rec'd to get endoscopy. c/b inferior MARY w/ refused LVEF w/ hypotension, cardiology believing that it is due to demand and possible ischemic component, deferring ischemic eval pending infxn mgmt. Tx'd w/ Zosyn. Also c/b thrombocytopenia w/ negative PF4. Forde placed w/ Flomax started. Now stable and downgraded to floors.     Subjective:     MEDICATIONS  (STANDING):  albuterol/ipratropium for Nebulization 3 milliLiter(s) Nebulizer every 6 hours  amLODIPine   Tablet 5 milliGRAM(s) Oral daily  aspirin  chewable 81 milliGRAM(s) Oral daily  atorvastatin 80 milliGRAM(s) Oral every 24 hours  bisacodyl Suppository 10 milliGRAM(s) Rectal daily  chlorhexidine 4% Liquid 1 Application(s) Topical <User Schedule>  clopidogrel Tablet 75 milliGRAM(s) Enteral Tube daily  fluticasone propionate 50 MICROgram(s)/spray Nasal Spray 1 Spray(s) Both Nostrils two times a day  heparin   Injectable 5000 Unit(s) SubCutaneous every 8 hours  lidocaine 2% Jelly 10 milliLiter(s) IntraUrethral daily  melatonin 3 milliGRAM(s) Oral at bedtime  metoprolol tartrate 25 milliGRAM(s) Oral every 12 hours  polyethylene glycol 3350 17 Gram(s) Oral daily  sacubitril 24 mG/valsartan 26 mG 1 Tablet(s) Oral two times a day  senna 1 Tablet(s) Oral daily  sodium chloride 0.65% Nasal 1 Spray(s) Both Nostrils two times a day  tamsulosin 0.4 milliGRAM(s) Oral at bedtime    MEDICATIONS  (PRN):      Objective:    Vitals: Vital Signs Last 24 Hrs  T(C): 37.2 (04-15-24 @ 22:20), Max: 37.2 (04-15-24 @ 22:20)  T(F): 99 (04-15-24 @ 22:20), Max: 99 (04-15-24 @ 22:20)  HR: 70 (04-16-24 @ 01:00) (62 - 77)  BP: 166/67 (04-16-24 @ 01:00) (106/53 - 173/76)  BP(mean): 109 (04-15-24 @ 21:00) (76 - 109)  RR: 18 (04-16-24 @ 01:00) (18 - 33)  SpO2: 96% (04-16-24 @ 01:00) (93% - 100%)            I&O's Summary    14 Apr 2024 07:01  -  15 Apr 2024 07:00  --------------------------------------------------------  IN: 675 mL / OUT: 2305 mL / NET: -1630 mL    15 Apr 2024 07:01  -  16 Apr 2024 03:38  --------------------------------------------------------  IN: 750 mL / OUT: 970 mL / NET: -220 mL        PHYSICAL EXAM:  GENERAL: NAD  HEAD:  Atraumatic, Normocephalic  EYES: EOMI, conjunctiva and sclera clear  CHEST/LUNG: Clear to auscultation bilaterally; No rales, rhonchi, wheezing, or rubs  HEART: Regular rate and rhythm; No murmurs, rubs, or gallops  ABDOMEN: Soft, Nontender, Nondistended;   SKIN: No rashes or lesions  NERVOUS SYSTEM:  Alert & Oriented X3, no focal deficits    LABS:  04-15    143  |  109<H>  |  34<H>  ----------------------------<  144<H>  3.6   |  23  |  1.36<H>  04-15    144  |  109<H>  |  33<H>  ----------------------------<  115<H>  3.5   |  23  |  1.72<H>  04-14    139  |  106  |  32<H>  ----------------------------<  143<H>  3.5   |  21<L>  |  1.40<H>    Ca    9.8      15 Apr 2024 14:05  Ca    9.7      15 Apr 2024 00:21  Ca    8.7      14 Apr 2024 01:21  Phos  2.3     04-15  Mg     2.1     04-15    TPro  6.0  /  Alb  3.1<L>  /  TBili  1.0  /  DBili  x   /  AST  23  /  ALT  17  /  AlkPhos  62  04-15  TPro  6.3  /  Alb  3.3  /  TBili  1.0  /  DBili  x   /  AST  24  /  ALT  18  /  AlkPhos  62  04-15  TPro  5.6<L>  /  Alb  2.9<L>  /  TBili  0.4  /  DBili  x   /  AST  22  /  ALT  16  /  AlkPhos  49  04-14      PT/INR - ( 15 Apr 2024 00:21 )   PT: 11.9 sec;   INR: 1.14 ratio         PTT - ( 15 Apr 2024 00:21 )  PTT:19.4 sec              Urinalysis Basic - ( 15 Apr 2024 14:05 )    Color: x / Appearance: x / SG: x / pH: x  Gluc: 144 mg/dL / Ketone: x  / Bili: x / Urobili: x   Blood: x / Protein: x / Nitrite: x   Leuk Esterase: x / RBC: x / WBC x   Sq Epi: x / Non Sq Epi: x / Bacteria: x                              12.7   7.44  )-----------( 88       ( 15 Apr 2024 00:21 )             37.9                         12.1   7.03  )-----------( 68       ( 14 Apr 2024 01:21 )             35.7                         12.3   7.85  )-----------( 76       ( 13 Apr 2024 17:52 )             36.0     CAPILLARY BLOOD GLUCOSE      POCT Blood Glucose.: 130 mg/dL (15 Apr 2024 16:40)  POCT Blood Glucose.: 136 mg/dL (15 Apr 2024 13:12)  POCT Blood Glucose.: 109 mg/dL (15 Apr 2024 07:52)      RADIOLOGY & ADDITIONAL TESTS:    Imaging Personally Reviewed:  [x ] YES  [ ] NO    Consultants involved in case:   Consultant(s) Notes Reviewed:  [ x] YES  [ ] NO:   Care Discussed with Consultants/Other Providers [x ] YES  [ ] NO         ***************************************************************  Dl (Hue) [facility] PGY2  Internal Medicine   ***************************************************************    MERNA CAZARES  89y  MRN: [id number]    89-yo M PMHx of HTN, BPPV, presenting for emesis and diarrhea, found to be in AHRF 2/2 LLL PNA req. intubation 4/11-4/12. MICU course notable for colitis and gastric wall nodule on CTAP w/ c/f neoplasm, left adrenal nodule, rec'd to get endoscopy. c/b inferior MARY w/ refused LVEF w/ hypotension, cardiology believing that it is due to demand and possible ischemic component, treated w/ asa/plavix + heparin x48 hrs, deferring ischemic eval pending infxn mgmt. Tx'd w/ cef/azithro then Zosyn ending 4/15 for 5 day course of abx. Also c/b thrombocytopenia w/ negative PF4. Forde placed w/ Flomax started. Now stable and downgraded to floors.     Subjective:     MEDICATIONS  (STANDING):  albuterol/ipratropium for Nebulization 3 milliLiter(s) Nebulizer every 6 hours  amLODIPine   Tablet 5 milliGRAM(s) Oral daily  aspirin  chewable 81 milliGRAM(s) Oral daily  atorvastatin 80 milliGRAM(s) Oral every 24 hours  bisacodyl Suppository 10 milliGRAM(s) Rectal daily  chlorhexidine 4% Liquid 1 Application(s) Topical <User Schedule>  clopidogrel Tablet 75 milliGRAM(s) Enteral Tube daily  fluticasone propionate 50 MICROgram(s)/spray Nasal Spray 1 Spray(s) Both Nostrils two times a day  heparin   Injectable 5000 Unit(s) SubCutaneous every 8 hours  lidocaine 2% Jelly 10 milliLiter(s) IntraUrethral daily  melatonin 3 milliGRAM(s) Oral at bedtime  metoprolol tartrate 25 milliGRAM(s) Oral every 12 hours  polyethylene glycol 3350 17 Gram(s) Oral daily  sacubitril 24 mG/valsartan 26 mG 1 Tablet(s) Oral two times a day  senna 1 Tablet(s) Oral daily  sodium chloride 0.65% Nasal 1 Spray(s) Both Nostrils two times a day  tamsulosin 0.4 milliGRAM(s) Oral at bedtime    MEDICATIONS  (PRN):      Objective:    Vitals: Vital Signs Last 24 Hrs  T(C): 37.2 (04-15-24 @ 22:20), Max: 37.2 (04-15-24 @ 22:20)  T(F): 99 (04-15-24 @ 22:20), Max: 99 (04-15-24 @ 22:20)  HR: 70 (04-16-24 @ 01:00) (62 - 77)  BP: 166/67 (04-16-24 @ 01:00) (106/53 - 173/76)  BP(mean): 109 (04-15-24 @ 21:00) (76 - 109)  RR: 18 (04-16-24 @ 01:00) (18 - 33)  SpO2: 96% (04-16-24 @ 01:00) (93% - 100%)            I&O's Summary    14 Apr 2024 07:01  -  15 Apr 2024 07:00  --------------------------------------------------------  IN: 675 mL / OUT: 2305 mL / NET: -1630 mL    15 Apr 2024 07:01  -  16 Apr 2024 03:38  --------------------------------------------------------  IN: 750 mL / OUT: 970 mL / NET: -220 mL        PHYSICAL EXAM:  GENERAL: NAD  HEAD:  Atraumatic, Normocephalic  EYES: EOMI, conjunctiva and sclera clear  CHEST/LUNG: Clear to auscultation bilaterally; No rales, rhonchi, wheezing, or rubs  HEART: Regular rate and rhythm; No murmurs, rubs, or gallops  ABDOMEN: Soft, Nontender, Nondistended;   SKIN: No rashes or lesions  NERVOUS SYSTEM:  Alert & Oriented X3, no focal deficits    LABS:  04-15    143  |  109<H>  |  34<H>  ----------------------------<  144<H>  3.6   |  23  |  1.36<H>  04-15    144  |  109<H>  |  33<H>  ----------------------------<  115<H>  3.5   |  23  |  1.72<H>  04-14    139  |  106  |  32<H>  ----------------------------<  143<H>  3.5   |  21<L>  |  1.40<H>    Ca    9.8      15 Apr 2024 14:05  Ca    9.7      15 Apr 2024 00:21  Ca    8.7      14 Apr 2024 01:21  Phos  2.3     04-15  Mg     2.1     04-15    TPro  6.0  /  Alb  3.1<L>  /  TBili  1.0  /  DBili  x   /  AST  23  /  ALT  17  /  AlkPhos  62  04-15  TPro  6.3  /  Alb  3.3  /  TBili  1.0  /  DBili  x   /  AST  24  /  ALT  18  /  AlkPhos  62  04-15  TPro  5.6<L>  /  Alb  2.9<L>  /  TBili  0.4  /  DBili  x   /  AST  22  /  ALT  16  /  AlkPhos  49  04-14      PT/INR - ( 15 Apr 2024 00:21 )   PT: 11.9 sec;   INR: 1.14 ratio         PTT - ( 15 Apr 2024 00:21 )  PTT:19.4 sec              Urinalysis Basic - ( 15 Apr 2024 14:05 )    Color: x / Appearance: x / SG: x / pH: x  Gluc: 144 mg/dL / Ketone: x  / Bili: x / Urobili: x   Blood: x / Protein: x / Nitrite: x   Leuk Esterase: x / RBC: x / WBC x   Sq Epi: x / Non Sq Epi: x / Bacteria: x                              12.7   7.44  )-----------( 88       ( 15 Apr 2024 00:21 )             37.9                         12.1   7.03  )-----------( 68       ( 14 Apr 2024 01:21 )             35.7                         12.3   7.85  )-----------( 76       ( 13 Apr 2024 17:52 )             36.0     CAPILLARY BLOOD GLUCOSE      POCT Blood Glucose.: 130 mg/dL (15 Apr 2024 16:40)  POCT Blood Glucose.: 136 mg/dL (15 Apr 2024 13:12)  POCT Blood Glucose.: 109 mg/dL (15 Apr 2024 07:52)      RADIOLOGY & ADDITIONAL TESTS:    Imaging Personally Reviewed:  [x ] YES  [ ] NO    Consultants involved in case:   Consultant(s) Notes Reviewed:  [ x] YES  [ ] NO:   Care Discussed with Consultants/Other Providers [x ] YES  [ ] NO         ***************************************************************  Dl (SrinathAnastaciaLr) [facility] PGY2  Internal Medicine   ***************************************************************    MERNA CAZARES  89y  MRN: [id number]    89-yo M PMHx of HTN, BPPV, presenting for emesis and diarrhea, found to be in AHRF 2/2 LLL PNA req. intubation 4/11-4/12. MICU course notable for +EPEC on GI PCR, colitis and gastric wall/adrenal nodule on CTAP w/ c/f neoplasm, rec'd to get endoscopy however family holding off on informing pt of diagnosis as they believe he will not pursue treatment. Course c/b inferior MARY w/ reduced LVEF w/ hypotension. Cardiology believing that it is due to demand and possible ischemic component, treated w/ asa/plavix + heparin x48 hrs, deferring ischemic eval pending infxn mgmt. Tx'd w/ cef/azithro then Zosyn ending 4/15 for 5 day course of abx. Also c/b thrombocytopenia w/ negative HIT panel. Forde placed w/ Flomax started in anticipation of TOV. Now stable and downgraded to floors.     Subjective: Alert and oriented, no SOB or pain. 1 episode of loose stool overnight.     MEDICATIONS  (STANDING):  albuterol/ipratropium for Nebulization 3 milliLiter(s) Nebulizer every 6 hours  amLODIPine   Tablet 5 milliGRAM(s) Oral daily  aspirin  chewable 81 milliGRAM(s) Oral daily  atorvastatin 80 milliGRAM(s) Oral every 24 hours  bisacodyl Suppository 10 milliGRAM(s) Rectal daily  chlorhexidine 4% Liquid 1 Application(s) Topical <User Schedule>  clopidogrel Tablet 75 milliGRAM(s) Enteral Tube daily  fluticasone propionate 50 MICROgram(s)/spray Nasal Spray 1 Spray(s) Both Nostrils two times a day  heparin   Injectable 5000 Unit(s) SubCutaneous every 8 hours  lidocaine 2% Jelly 10 milliLiter(s) IntraUrethral daily  melatonin 3 milliGRAM(s) Oral at bedtime  metoprolol tartrate 25 milliGRAM(s) Oral every 12 hours  polyethylene glycol 3350 17 Gram(s) Oral daily  sacubitril 24 mG/valsartan 26 mG 1 Tablet(s) Oral two times a day  senna 1 Tablet(s) Oral daily  sodium chloride 0.65% Nasal 1 Spray(s) Both Nostrils two times a day  tamsulosin 0.4 milliGRAM(s) Oral at bedtime    MEDICATIONS  (PRN):      Objective:    Vitals: Vital Signs Last 24 Hrs  T(C): 37.2 (04-15-24 @ 22:20), Max: 37.2 (04-15-24 @ 22:20)  T(F): 99 (04-15-24 @ 22:20), Max: 99 (04-15-24 @ 22:20)  HR: 70 (04-16-24 @ 01:00) (62 - 77)  BP: 166/67 (04-16-24 @ 01:00) (106/53 - 173/76)  BP(mean): 109 (04-15-24 @ 21:00) (76 - 109)  RR: 18 (04-16-24 @ 01:00) (18 - 33)  SpO2: 96% (04-16-24 @ 01:00) (93% - 100%)            I&O's Summary    14 Apr 2024 07:01  -  15 Apr 2024 07:00  --------------------------------------------------------  IN: 675 mL / OUT: 2305 mL / NET: -1630 mL    15 Apr 2024 07:01  -  16 Apr 2024 03:38  --------------------------------------------------------  IN: 750 mL / OUT: 970 mL / NET: -220 mL        PHYSICAL EXAM:  GENERAL: NAD  HEAD:  Atraumatic, Normocephalic  EYES: EOMI, conjunctiva and sclera clear  CHEST/LUNG: Clear to auscultation bilaterally; No rales, rhonchi, wheezing, or rubs  HEART: Regular rate and rhythm; No murmurs, rubs, or gallops  ABDOMEN: Soft, Nontender, Nondistended;   SKIN: No rashes or lesions  NERVOUS SYSTEM:  Alert & Oriented X3, no focal deficits    LABS:  04-15    143  |  109<H>  |  34<H>  ----------------------------<  144<H>  3.6   |  23  |  1.36<H>  04-15    144  |  109<H>  |  33<H>  ----------------------------<  115<H>  3.5   |  23  |  1.72<H>  04-14    139  |  106  |  32<H>  ----------------------------<  143<H>  3.5   |  21<L>  |  1.40<H>    Ca    9.8      15 Apr 2024 14:05  Ca    9.7      15 Apr 2024 00:21  Ca    8.7      14 Apr 2024 01:21  Phos  2.3     04-15  Mg     2.1     04-15    TPro  6.0  /  Alb  3.1<L>  /  TBili  1.0  /  DBili  x   /  AST  23  /  ALT  17  /  AlkPhos  62  04-15  TPro  6.3  /  Alb  3.3  /  TBili  1.0  /  DBili  x   /  AST  24  /  ALT  18  /  AlkPhos  62  04-15  TPro  5.6<L>  /  Alb  2.9<L>  /  TBili  0.4  /  DBili  x   /  AST  22  /  ALT  16  /  AlkPhos  49  04-14      PT/INR - ( 15 Apr 2024 00:21 )   PT: 11.9 sec;   INR: 1.14 ratio         PTT - ( 15 Apr 2024 00:21 )  PTT:19.4 sec              Urinalysis Basic - ( 15 Apr 2024 14:05 )    Color: x / Appearance: x / SG: x / pH: x  Gluc: 144 mg/dL / Ketone: x  / Bili: x / Urobili: x   Blood: x / Protein: x / Nitrite: x   Leuk Esterase: x / RBC: x / WBC x   Sq Epi: x / Non Sq Epi: x / Bacteria: x                              12.7   7.44  )-----------( 88       ( 15 Apr 2024 00:21 )             37.9                         12.1   7.03  )-----------( 68       ( 14 Apr 2024 01:21 )             35.7                         12.3   7.85  )-----------( 76       ( 13 Apr 2024 17:52 )             36.0     CAPILLARY BLOOD GLUCOSE      POCT Blood Glucose.: 130 mg/dL (15 Apr 2024 16:40)  POCT Blood Glucose.: 136 mg/dL (15 Apr 2024 13:12)  POCT Blood Glucose.: 109 mg/dL (15 Apr 2024 07:52)      RADIOLOGY & ADDITIONAL TESTS:    Imaging Personally Reviewed:  [x ] YES  [ ] NO    Consultants involved in case:   Consultant(s) Notes Reviewed:  [ x] YES  [ ] NO:   Care Discussed with Consultants/Other Providers [x ] YES  [ ] NO

## 2024-04-16 NOTE — PROGRESS NOTE ADULT - PROBLEM SELECTOR PLAN 5
- Admitted for HERBERT 1.48, fluctuating, most recently 1.36, had downtrended from 1.7  - CTM for now, likely in s/o sepsis - Admitted for HERBERT, fluctuating but improving overall  - CTM for now, likely in s/o sepsis

## 2024-04-16 NOTE — PROGRESS NOTE ADULT - PROBLEM SELECTOR PLAN 1
- LLL PNA, intubated 4/11-4/12 for AHRF.  - s/p CTX, Azithro 4/11-4/12, Zosyn 4/13-4/15 for 5-day course  - Now on 4L NC  - Monitor off abx, wean O2 as tolerated

## 2024-04-16 NOTE — PROGRESS NOTE ADULT - PROBLEM SELECTOR PLAN 11
DVT PPx: Heparin SubQ  Diet: DASH  Bowel Regimen: PRN  Code: Full  Dispo: Per PT, RON  Pharmacy:  PCP:   Communication:

## 2024-04-16 NOTE — PROGRESS NOTE ADULT - PROBLEM SELECTOR PLAN 6
- Pt w/ c/f urinary retention given HERBERT, started on Flomax to remove mckee.  - Start flomax  - TOV tonight - Pt w/ c/f urinary retention given HERBERT, started on Flomax to remove mckee.  - Continue flomax  - TOV successful

## 2024-04-16 NOTE — PROGRESS NOTE ADULT - PROBLEM SELECTOR PLAN 5
- Admitted for HERBERT 1.48, fluctuating, most recently 1.36, had downtrended from 1.7  - CTM for now, likely in s/o sepsis

## 2024-04-16 NOTE — PROGRESS NOTE ADULT - ATTENDING COMMENTS
89y M pmh HTN, BPV, p/w emesis and diarrheal episodes, subsequently found to be in AHRF 2/2 LLL PNA required MICU stay & intubation 4/11-4/12. CT imaging c/f neoplasm, left adrenal nodule, course c/b inferior MARY suspected to be 2/2 demand ischemia s/p heparin gtt, further cardiac intervention pending tx of acute process, now stabilized and downgraded to medicine for further eval     #AHRF 2/2  LLL PNA   -S/p Intubation 4/11- 4/12, now on 4L NC  -S/p abx course x5d   - Clinically improving, Monitor off abx   - Trend labs  - Wean O2 as tolerates, Duoneb, prn albuterol     #MARY , reduced LVEF   - MARY in inferiorlat leads + elevated troponin   - EKG changes suspected ISO infection, ?demand ischemia   - S/p heparin gtt x48hr, ASA, Plavix load  - C/w ASA, Plavix,  statin   - Further ischemic w/u pending optimization of acute process, f/u Cardio for further rec      #Thrombocytopenia    - Noted to have thrombocytopenia iso heparin gtt c/f HIT,   - HIT panel neg  - Trend labs      Rest per resident

## 2024-04-16 NOTE — PROGRESS NOTE ADULT - PROBLEM SELECTOR PLAN 2
- MARY in inferolateral leads, w/ reduced LVEF  - Troponin peaked at 4/14 in 1300s, treated w/ heparin for 48 hrs per cards recommendations and aspirin/plavix load  - Cardiology consulted, appreciate recommendations. Suspect demand. Pending resolution of acute illness before ischemic eval.  - Continue ASA/Plavix, Atorvastatin 80 mg daily  - Monitor for clinical chest pain

## 2024-04-16 NOTE — PROGRESS NOTE ADULT - PROBLEM SELECTOR PLAN 3
- Pt w/ new LVEF w/o documented hx of HF.   - TTE w/ abnormal apex, LVEF mildly decreased.  - Continue metoprolol and entresto  - Cardiology consulted, appreciate recommendations re: uptitration of GDMT

## 2024-04-16 NOTE — PROGRESS NOTE ADULT - PROBLEM SELECTOR PLAN 6
- Pt w/ c/f urinary retention given HERBERT, started on Flomax to remove mckee.  - Start flomax  - TOV - Pt w/ c/f urinary retention given HERBERT, started on Flomax to remove mckee.  - Start flomax  - TOV tonight

## 2024-04-16 NOTE — PROGRESS NOTE ADULT - PROBLEM SELECTOR PLAN 8
- CTAP w/ nodule in gastric body  - EGD eval for neoplasm however pt 89 yo and family wishes not to tell this to patient as would likely not accept chemo.  - Likely would defer further evaluation, if within [facility] by family.

## 2024-04-16 NOTE — PROGRESS NOTE ADULT - ATTENDING COMMENTS
89-yo M PMHx of HTN, BPPV, presenting for emesis and diarrhea, found to be in AHRF 2/2 LLL PNA s/p intubation 4/11-4/12, c/b MARY likely due to demand.     #Acute hypoxic respiratory failure   #gram negative PNA  #STEMI vs demand ischemia   #acute systolic heart failure with reduced EF   #EPEC colitis      - Pt s/p 5d course of antibiotics for PNA; currently on 4LNC - aggressive pulmonary toilet, wean O2 as tolerated  - also found with EPEC on GI PCR, on supportive measures.   - noted with MARY/elevated troponin, with TTE showing depressed EF, cardiology following - suspect likely 2/2 demand. s/p DAPT and 48hrs of heprain gtt. Cardiology is not currently planning for ischemic eval; repeat TTE pending   - urinary retention s/p mckee - undergoing TOV now   - thrombocytopenia likely 2/2 critical illness, now improving, c/t monitor   - HERBERT 2/2 sepsis, improving   - gastric nodule, adrenal nodule - family aware, further w/u as outpt if within [facility]

## 2024-04-16 NOTE — PROGRESS NOTE ADULT - PROBLEM SELECTOR PLAN 2
- MARY in inferolateral leads, w/ reduced LVEF  - Troponin peaked at 4/14 in 1300s, treated w/ heparin for 48 hrs per cards recommendations and aspirin/plavix load  - Cardiology consulted, appreciate recommendations. Suspect demand. Pending resolution of acute illness before ischemic eval.  - Continue ASA/Plavix, Atorvastatin 80 mg daily  - Monitor for clinical chest pain - MARY in inferolateral leads, w/ reduced LVEF  - Troponin peaked at 4/14 in 1300s, treated w/ heparin for 48 hrs per cards recommendations and aspirin/plavix load  - Cardiology consulted, appreciate recommendations. Suspect demand. Did have WMA on echo. Would only receive medical management regardless. Repeat TTE to assess resolution now that sepsis resolved  - Continue ASA/Plavix, Atorvastatin 80 mg daily  - Monitor for clinical chest pain

## 2024-04-16 NOTE — PROGRESS NOTE ADULT - PROBLEM SELECTOR PLAN 9
- Pt w/ adrenal nodule seen on CTAP   - Warrants dedicated Adrenal CT or MRI for further eval.   - Would discuss w/ family if work-up within [facility], as family did not wish for EGD for eval of Gastric Nodule

## 2024-04-17 ENCOUNTER — RESULT REVIEW (OUTPATIENT)
Age: 89
End: 2024-04-17

## 2024-04-17 LAB
ALBUMIN SERPL ELPH-MCNC: 3 G/DL — LOW (ref 3.3–5)
ALP SERPL-CCNC: 64 U/L — SIGNIFICANT CHANGE UP (ref 40–120)
ALT FLD-CCNC: 28 U/L — SIGNIFICANT CHANGE UP (ref 10–45)
ANION GAP SERPL CALC-SCNC: 11 MMOL/L — SIGNIFICANT CHANGE UP (ref 5–17)
AST SERPL-CCNC: 26 U/L — SIGNIFICANT CHANGE UP (ref 10–40)
BILIRUB SERPL-MCNC: 0.4 MG/DL — SIGNIFICANT CHANGE UP (ref 0.2–1.2)
BUN SERPL-MCNC: 32 MG/DL — HIGH (ref 7–23)
CALCIUM SERPL-MCNC: 9.2 MG/DL — SIGNIFICANT CHANGE UP (ref 8.4–10.5)
CHLORIDE SERPL-SCNC: 111 MMOL/L — HIGH (ref 96–108)
CO2 SERPL-SCNC: 23 MMOL/L — SIGNIFICANT CHANGE UP (ref 22–31)
CREAT SERPL-MCNC: 1.04 MG/DL — SIGNIFICANT CHANGE UP (ref 0.5–1.3)
EGFR: 68 ML/MIN/1.73M2 — SIGNIFICANT CHANGE UP
FLUAV AG NPH QL: SIGNIFICANT CHANGE UP
FLUBV AG NPH QL: SIGNIFICANT CHANGE UP
GLUCOSE BLDC GLUCOMTR-MCNC: 126 MG/DL — HIGH (ref 70–99)
GLUCOSE BLDC GLUCOMTR-MCNC: 131 MG/DL — HIGH (ref 70–99)
GLUCOSE BLDC GLUCOMTR-MCNC: 133 MG/DL — HIGH (ref 70–99)
GLUCOSE SERPL-MCNC: 115 MG/DL — HIGH (ref 70–99)
GLYCOPROTEIN IV ANTIBODY: NEGATIVE — SIGNIFICANT CHANGE UP
HCT VFR BLD CALC: 36.1 % — LOW (ref 39–50)
HGB BLD-MCNC: 12.1 G/DL — LOW (ref 13–17)
HLA AB SER QL IA: NEGATIVE — SIGNIFICANT CHANGE UP
MAGNESIUM SERPL-MCNC: 1.8 MG/DL — SIGNIFICANT CHANGE UP (ref 1.6–2.6)
MCHC RBC-ENTMCNC: 30.2 PG — SIGNIFICANT CHANGE UP (ref 27–34)
MCHC RBC-ENTMCNC: 33.5 GM/DL — SIGNIFICANT CHANGE UP (ref 32–36)
MCV RBC AUTO: 90 FL — SIGNIFICANT CHANGE UP (ref 80–100)
NRBC # BLD: 0 /100 WBCS — SIGNIFICANT CHANGE UP (ref 0–0)
PHOSPHATE SERPL-MCNC: 2.9 MG/DL — SIGNIFICANT CHANGE UP (ref 2.5–4.5)
PLAT GP IA/IIA AB SER QL IA: NEGATIVE — SIGNIFICANT CHANGE UP
PLAT GP IB/IX AB SER QL IA: NEGATIVE — SIGNIFICANT CHANGE UP
PLAT GP IIB/IIIA AB SER QL IA: NEGATIVE — SIGNIFICANT CHANGE UP
PLATELET # BLD AUTO: 104 K/UL — LOW (ref 150–400)
POTASSIUM SERPL-MCNC: 3.5 MMOL/L — SIGNIFICANT CHANGE UP (ref 3.5–5.3)
POTASSIUM SERPL-SCNC: 3.5 MMOL/L — SIGNIFICANT CHANGE UP (ref 3.5–5.3)
PROT SERPL-MCNC: 5.7 G/DL — LOW (ref 6–8.3)
RBC # BLD: 4.01 M/UL — LOW (ref 4.2–5.8)
RBC # FLD: 13.6 % — SIGNIFICANT CHANGE UP (ref 10.3–14.5)
RSV RNA NPH QL NAA+NON-PROBE: SIGNIFICANT CHANGE UP
SARS-COV-2 RNA SPEC QL NAA+PROBE: SIGNIFICANT CHANGE UP
SODIUM SERPL-SCNC: 145 MMOL/L — SIGNIFICANT CHANGE UP (ref 135–145)
UNFRACTIONATED HEPARIN INTERPRETATION: SIGNIFICANT CHANGE UP
UNFRACTIONATED HEPARIN RESULT: NEGATIVE — SIGNIFICANT CHANGE UP
UNFRACTIONATED HEPARIN-HIGH DOSE: 0 % — SIGNIFICANT CHANGE UP
UNFRACTIONATED HEPARIN-LOW DOSE: 0 % — SIGNIFICANT CHANGE UP
WBC # BLD: 6.7 K/UL — SIGNIFICANT CHANGE UP (ref 3.8–10.5)
WBC # FLD AUTO: 6.7 K/UL — SIGNIFICANT CHANGE UP (ref 3.8–10.5)

## 2024-04-17 PROCEDURE — 93308 TTE F-UP OR LMTD: CPT | Mod: 26

## 2024-04-17 PROCEDURE — 93321 DOPPLER ECHO F-UP/LMTD STD: CPT | Mod: 26

## 2024-04-17 PROCEDURE — 99232 SBSQ HOSP IP/OBS MODERATE 35: CPT | Mod: GC

## 2024-04-17 RX ORDER — SODIUM CHLORIDE 9 MG/ML
4 INJECTION INTRAMUSCULAR; INTRAVENOUS; SUBCUTANEOUS EVERY 12 HOURS
Refills: 0 | Status: DISCONTINUED | OUTPATIENT
Start: 2024-04-17 | End: 2024-04-22

## 2024-04-17 RX ORDER — CARVEDILOL PHOSPHATE 80 MG/1
12.5 CAPSULE, EXTENDED RELEASE ORAL EVERY 12 HOURS
Refills: 0 | Status: DISCONTINUED | OUTPATIENT
Start: 2024-04-17 | End: 2024-04-22

## 2024-04-17 RX ADMIN — Medication 3 MILLIGRAM(S): at 21:02

## 2024-04-17 RX ADMIN — SACUBITRIL AND VALSARTAN 1 TABLET(S): 24; 26 TABLET, FILM COATED ORAL at 05:14

## 2024-04-17 RX ADMIN — SODIUM CHLORIDE 4 MILLILITER(S): 9 INJECTION INTRAMUSCULAR; INTRAVENOUS; SUBCUTANEOUS at 17:21

## 2024-04-17 RX ADMIN — CHLORHEXIDINE GLUCONATE 1 APPLICATION(S): 213 SOLUTION TOPICAL at 07:41

## 2024-04-17 RX ADMIN — Medication 600 MILLIGRAM(S): at 05:14

## 2024-04-17 RX ADMIN — CLOPIDOGREL BISULFATE 75 MILLIGRAM(S): 75 TABLET, FILM COATED ORAL at 11:33

## 2024-04-17 RX ADMIN — Medication 81 MILLIGRAM(S): at 05:14

## 2024-04-17 RX ADMIN — POLYETHYLENE GLYCOL 3350 17 GRAM(S): 17 POWDER, FOR SOLUTION ORAL at 11:39

## 2024-04-17 RX ADMIN — AMLODIPINE BESYLATE 5 MILLIGRAM(S): 2.5 TABLET ORAL at 05:14

## 2024-04-17 RX ADMIN — Medication 50 MILLIGRAM(S): at 05:14

## 2024-04-17 RX ADMIN — Medication 1 TABLET(S): at 11:34

## 2024-04-17 RX ADMIN — HEPARIN SODIUM 5000 UNIT(S): 5000 INJECTION INTRAVENOUS; SUBCUTANEOUS at 13:22

## 2024-04-17 RX ADMIN — ATORVASTATIN CALCIUM 80 MILLIGRAM(S): 80 TABLET, FILM COATED ORAL at 11:33

## 2024-04-17 RX ADMIN — TAMSULOSIN HYDROCHLORIDE 0.4 MILLIGRAM(S): 0.4 CAPSULE ORAL at 21:01

## 2024-04-17 RX ADMIN — Medication 3 MILLILITER(S): at 00:03

## 2024-04-17 RX ADMIN — Medication 3 MILLILITER(S): at 11:33

## 2024-04-17 RX ADMIN — Medication 1 TABLET(S): at 08:36

## 2024-04-17 RX ADMIN — Medication 1 SPRAY(S): at 05:34

## 2024-04-17 RX ADMIN — Medication 3 MILLILITER(S): at 05:13

## 2024-04-17 RX ADMIN — Medication 1 SPRAY(S): at 05:42

## 2024-04-17 RX ADMIN — Medication 1 SPRAY(S): at 17:23

## 2024-04-17 RX ADMIN — Medication 3 MILLILITER(S): at 17:21

## 2024-04-17 RX ADMIN — HEPARIN SODIUM 5000 UNIT(S): 5000 INJECTION INTRAVENOUS; SUBCUTANEOUS at 05:14

## 2024-04-17 RX ADMIN — Medication 600 MILLIGRAM(S): at 17:22

## 2024-04-17 RX ADMIN — Medication 3 MILLILITER(S): at 23:23

## 2024-04-17 RX ADMIN — CARVEDILOL PHOSPHATE 12.5 MILLIGRAM(S): 80 CAPSULE, EXTENDED RELEASE ORAL at 17:22

## 2024-04-17 RX ADMIN — SACUBITRIL AND VALSARTAN 1 TABLET(S): 24; 26 TABLET, FILM COATED ORAL at 17:21

## 2024-04-17 RX ADMIN — SENNA PLUS 1 TABLET(S): 8.6 TABLET ORAL at 11:33

## 2024-04-17 RX ADMIN — HEPARIN SODIUM 5000 UNIT(S): 5000 INJECTION INTRAVENOUS; SUBCUTANEOUS at 21:01

## 2024-04-17 NOTE — PROGRESS NOTE ADULT - PROBLEM SELECTOR PLAN 2
- MARY in inferolateral leads, w/ reduced LVEF  - Troponin peaked at 4/14 in 1300s, treated w/ heparin for 48 hrs per cards recommendations and aspirin/plavix load  - Cardiology consulted, appreciate recommendations. Suspect demand. Did have WMA on echo. Would only receive medical management regardless. Repeat TTE to assess resolution now that sepsis resolved  - Continue ASA/Plavix, Atorvastatin 80 mg daily  - Monitor for clinical chest pain

## 2024-04-17 NOTE — PROGRESS NOTE ADULT - PROBLEM SELECTOR PLAN 8
- CTAP w/ nodule in gastric body  - EGD eval for neoplasm however pt 89 yo and family wishes not to tell this to patient as would likely not accept chemo.  - Likely would defer further evaluation, if within [facility] by family. - CTAP w/ nodule in gastric body  - EGD eval for neoplasm however pt 89 yo and family wishes not to tell this to patient as would likely not accept chemo.  - Likely would defer further evaluation, if within [facility] of patient

## 2024-04-17 NOTE — PROGRESS NOTE ADULT - ASSESSMENT
89-yo M PMHx of HTN, BPPV, presenting for emesis and diarrhea, found to be in AHRF 2/2 LLL PNA req. intubation 4/11-4/12, c/b MARY likely due to demand, now on 4L NC and downgraded to floors for monitoring, uptitration of GDMT, and physical therapy.

## 2024-04-17 NOTE — PROGRESS NOTE ADULT - PROBLEM SELECTOR PLAN 9
- Pt w/ adrenal nodule seen on CTAP   - Warrants dedicated Adrenal CT or MRI for further eval.   - Would discuss w/ family if work-up within [facility], as family did not wish for EGD for eval of Gastric Nodule - Pt w/ adrenal nodule seen on CTAP   - Warrants dedicated Adrenal CT or MRI for further eval.   - To be discussed with family and patient together

## 2024-04-17 NOTE — PROGRESS NOTE ADULT - PROBLEM SELECTOR PLAN 7
- Pt w/ course c/b thrombocytopenia, HIT panel negative  - Off heparin drip for STEs, x48 hours.   - Suspect sepsis related  - CTM, improving

## 2024-04-17 NOTE — PROGRESS NOTE ADULT - SUBJECTIVE AND OBJECTIVE BOX
PATIENT: MERNA CAZARES, MRN: [id number]    CHIEF COMPLAINT: Patient is a 89y old  Male who presents with a chief complaint of Hypoxia requiring intubation (16 Apr 2024 10:03)      INTERVAL HISTORY/OVERNIGHT EVENTS: No overnight events.       MEDICATIONS:  MEDICATIONS  (STANDING):  albuterol/ipratropium for Nebulization 3 milliLiter(s) Nebulizer every 6 hours  amLODIPine   Tablet 5 milliGRAM(s) Oral daily  aspirin  chewable 81 milliGRAM(s) Oral daily  atorvastatin 80 milliGRAM(s) Oral every 24 hours  bisacodyl Suppository 10 milliGRAM(s) Rectal daily  chlorhexidine 4% Liquid 1 Application(s) Topical <User Schedule>  clopidogrel Tablet 75 milliGRAM(s) Enteral Tube daily  fluticasone propionate 50 MICROgram(s)/spray Nasal Spray 1 Spray(s) Both Nostrils two times a day  guaiFENesin  milliGRAM(s) Oral every 12 hours  heparin   Injectable 5000 Unit(s) SubCutaneous every 8 hours  melatonin 3 milliGRAM(s) Oral at bedtime  metoprolol succinate ER 50 milliGRAM(s) Oral daily  polyethylene glycol 3350 17 Gram(s) Oral daily  potassium phosphate / sodium phosphate Tablet (K-PHOS No. 2) 1 Tablet(s) Oral four times a day with meals  sacubitril 49 mG/valsartan 51 mG 1 Tablet(s) Oral two times a day  senna 1 Tablet(s) Oral daily  sodium chloride 0.65% Nasal 1 Spray(s) Both Nostrils two times a day  tamsulosin 0.4 milliGRAM(s) Oral at bedtime    MEDICATIONS  (PRN):      ALLERGIES: Allergies    iodine (Other)    Intolerances        OBJECTIVE:  ICU Vital Signs Last 24 Hrs  T(C): 36.6 (17 Apr 2024 01:00), Max: 36.6 (16 Apr 2024 14:00)  T(F): 97.9 (17 Apr 2024 01:00), Max: 97.9 (16 Apr 2024 14:00)  HR: 75 (17 Apr 2024 05:00) (64 - 75)  BP: 173/86 (17 Apr 2024 05:00) (134/73 - 173/86)  BP(mean): --  ABP: --  ABP(mean): --  RR: 18 (17 Apr 2024 05:00) (16 - 18)  SpO2: 96% (17 Apr 2024 05:00) (93% - 96%)    O2 Parameters below as of 17 Apr 2024 05:00  Patient On (Oxygen Delivery Method): nasal cannula  O2 Flow (L/min): 3          CAPILLARY BLOOD GLUCOSE        CAPILLARY BLOOD GLUCOSE      POCT Blood Glucose.: 130 mg/dL (15 Apr 2024 16:40)    I&O's Summary    16 Apr 2024 07:01  -  17 Apr 2024 07:00  --------------------------------------------------------  IN: 0 mL / OUT: 500 mL / NET: -500 mL      Daily     Daily     PHYSICAL EXAMINATION:  General: Comfortable, no acute distress, cooperative with exam.  HEENT: PERRLA  Respiratory: CTAB, normal respiratory effort, no coughing, wheezes, crackles, or rales.  CV: RRR, S1S2, no murmurs, rubs or gallops. No JVD. Distal pulses intact.  Abdominal: Soft, nontender, nondistended, no rebound or guarding, normal bowel sounds.  Neurology: AOx3, no focal neuro defects, LUNDBERG x 4.  Extremities: No pitting edema, + Peripheral pulses.      LABS:                          12.8   7.06  )-----------( 92       ( 16 Apr 2024 07:00 )             39.5     04-17    145  |  111<H>  |  32<H>  ----------------------------<  115<H>  3.5   |  23  |  1.04    Ca    9.2      17 Apr 2024 06:39  Phos  2.9     04-17  Mg     1.8     04-17    TPro  5.7<L>  /  Alb  3.0<L>  /  TBili  0.4  /  DBili  x   /  AST  26  /  ALT  28  /  AlkPhos  64  04-17    LIVER FUNCTIONS - ( 17 Apr 2024 06:39 )  Alb: 3.0 g/dL / Pro: 5.7 g/dL / ALK PHOS: 64 U/L / ALT: 28 U/L / AST: 26 U/L / GGT: x                   Urinalysis Basic - ( 17 Apr 2024 06:39 )    Color: x / Appearance: x / SG: x / pH: x  Gluc: 115 mg/dL / Ketone: x  / Bili: x / Urobili: x   Blood: x / Protein: x / Nitrite: x   Leuk Esterase: x / RBC: x / WBC x   Sq Epi: x / Non Sq Epi: x / Bacteria: x       PATIENT: MERNA CAZARES, MRN: [id number]    CHIEF COMPLAINT: Patient is a 89y old  Male who presents with a chief complaint of Hypoxia requiring intubation (16 Apr 2024 10:03)      INTERVAL HISTORY/OVERNIGHT EVENTS: No overnight events. Patient states that his sputum and congestion may be slightly improved. Has not had increased secretion mobilization with chest vest and chest PT. Not SOB but unable to complete sentences at times due to his phlegm. Is bedridden and is disappointed.      MEDICATIONS:  MEDICATIONS  (STANDING):  albuterol/ipratropium for Nebulization 3 milliLiter(s) Nebulizer every 6 hours  amLODIPine   Tablet 5 milliGRAM(s) Oral daily  aspirin  chewable 81 milliGRAM(s) Oral daily  atorvastatin 80 milliGRAM(s) Oral every 24 hours  bisacodyl Suppository 10 milliGRAM(s) Rectal daily  chlorhexidine 4% Liquid 1 Application(s) Topical <User Schedule>  clopidogrel Tablet 75 milliGRAM(s) Enteral Tube daily  fluticasone propionate 50 MICROgram(s)/spray Nasal Spray 1 Spray(s) Both Nostrils two times a day  guaiFENesin  milliGRAM(s) Oral every 12 hours  heparin   Injectable 5000 Unit(s) SubCutaneous every 8 hours  melatonin 3 milliGRAM(s) Oral at bedtime  metoprolol succinate ER 50 milliGRAM(s) Oral daily  polyethylene glycol 3350 17 Gram(s) Oral daily  potassium phosphate / sodium phosphate Tablet (K-PHOS No. 2) 1 Tablet(s) Oral four times a day with meals  sacubitril 49 mG/valsartan 51 mG 1 Tablet(s) Oral two times a day  senna 1 Tablet(s) Oral daily  sodium chloride 0.65% Nasal 1 Spray(s) Both Nostrils two times a day  tamsulosin 0.4 milliGRAM(s) Oral at bedtime    MEDICATIONS  (PRN):      ALLERGIES: Allergies    iodine (Other)    Intolerances        OBJECTIVE:  ICU Vital Signs Last 24 Hrs  T(C): 36.6 (17 Apr 2024 01:00), Max: 36.6 (16 Apr 2024 14:00)  T(F): 97.9 (17 Apr 2024 01:00), Max: 97.9 (16 Apr 2024 14:00)  HR: 75 (17 Apr 2024 05:00) (64 - 75)  BP: 173/86 (17 Apr 2024 05:00) (134/73 - 173/86)  BP(mean): --  ABP: --  ABP(mean): --  RR: 18 (17 Apr 2024 05:00) (16 - 18)  SpO2: 96% (17 Apr 2024 05:00) (93% - 96%)    O2 Parameters below as of 17 Apr 2024 05:00  Patient On (Oxygen Delivery Method): nasal cannula  O2 Flow (L/min): 3          CAPILLARY BLOOD GLUCOSE        CAPILLARY BLOOD GLUCOSE      POCT Blood Glucose.: 130 mg/dL (15 Apr 2024 16:40)    I&O's Summary    16 Apr 2024 07:01  -  17 Apr 2024 07:00  --------------------------------------------------------  IN: 0 mL / OUT: 500 mL / NET: -500 mL      Daily     Daily     PHYSICAL EXAMINATION:  General: Comfortable, no acute distress, cooperative with exam. Does have upper airway sounds  Respiratory: Rhonchi, heard throughout, no crackles, no wheezing  CV: RRR, S1S2, no murmurs, rubs or gallops.   Abdominal: Soft, nontender, nondistended, no rebound or guarding  Neurology: No focal neuro defects, LUNDBERG x 4.  Extremities: No pitting edema      LABS:                          12.8   7.06  )-----------( 92       ( 16 Apr 2024 07:00 )             39.5     04-17    145  |  111<H>  |  32<H>  ----------------------------<  115<H>  3.5   |  23  |  1.04    Ca    9.2      17 Apr 2024 06:39  Phos  2.9     04-17  Mg     1.8     04-17    TPro  5.7<L>  /  Alb  3.0<L>  /  TBili  0.4  /  DBili  x   /  AST  26  /  ALT  28  /  AlkPhos  64  04-17    LIVER FUNCTIONS - ( 17 Apr 2024 06:39 )  Alb: 3.0 g/dL / Pro: 5.7 g/dL / ALK PHOS: 64 U/L / ALT: 28 U/L / AST: 26 U/L / GGT: x                   Urinalysis Basic - ( 17 Apr 2024 06:39 )    Color: x / Appearance: x / SG: x / pH: x  Gluc: 115 mg/dL / Ketone: x  / Bili: x / Urobili: x   Blood: x / Protein: x / Nitrite: x   Leuk Esterase: x / RBC: x / WBC x   Sq Epi: x / Non Sq Epi: x / Bacteria: x

## 2024-04-17 NOTE — PROGRESS NOTE ADULT - PROBLEM SELECTOR PLAN 3
- Pt w/ new LVEF w/o documented hx of HF.   - TTE w/ abnormal apex, LVEF mildly decreased.  - Continue metoprolol and entresto. Uptitrate GDMT as tolerated  - Cardiology consulted, appreciate recs - Pt w/ new LVEF w/o documented hx of HF.   - TTE w/ abnormal apex, LVEF mildly decreased.  - Uptitrating entresto, switching from metoprolol to coreg given high BP and uptitrating GDMT  - Cardiology consulted, appreciate recs

## 2024-04-17 NOTE — PROGRESS NOTE ADULT - PROBLEM SELECTOR PLAN 5
- Admitted for HERBERT, fluctuating but improving overall  - CTM for now, likely in s/o sepsis - Admitted for HERBERT, improving with resolution of sepsis

## 2024-04-17 NOTE — PROGRESS NOTE ADULT - ATTENDING COMMENTS
89-yo M PMHx of HTN, BPPV, presenting for emesis and diarrhea, found to be in AHRF 2/2 LLL PNA s/p intubation 4/11-4/12, c/b MARY likely due to demand.     #Acute hypoxic respiratory failure   #gram negative PNA  #STEMI vs demand ischemia   #acute systolic heart failure with reduced EF   #EPEC colitis    - Pt s/p 5d course of antibiotics for PNA; currently on 4LNC - aggressive pulmonary toilet, wean O2 as tolerated  - also found with EPEC on GI PCR, on supportive measures.   - noted with MARY/elevated troponin, with TTE showing depressed EF, cardiology following - suspect likely 2/2 demand. s/p DAPT and 48hrs of heparin gtt. Cardiology is not currently planning for ischemic eval; repeat TTE pending   - urinary retention s/p mckee - passed TOV  - thrombocytopenia likely 2/2 critical illness, now improving, c/t monitor   - HERBERT 2/2 sepsis, improving   - gastric nodule, adrenal nodule - family aware, further w/u as outpt if within [facility] .

## 2024-04-17 NOTE — PROGRESS NOTE ADULT - PROBLEM SELECTOR PLAN 6
- Pt w/ c/f urinary retention given HERBERT, started on Flomax to remove mckee.  - Continue flomax  - TOV successful

## 2024-04-17 NOTE — PROGRESS NOTE ADULT - PROBLEM SELECTOR PLAN 1
- LLL PNA, intubated 4/11-4/12 for AHRF.  - s/p CTX, Azithro 4/11-4/12, Zosyn 4/13-4/15 for 5-day course  - Now on 4L NC  - Monitor off abx, wean O2 as tolerated  - Chest vest, Incetive spirometer, pulmonary toilet, PT, OOB to chair - LLL PNA, intubated 4/11-4/12 for AHRF.  - s/p CTX, Azithro 4/11-4/12, Zosyn 4/13-4/15 for 5-day course  - Now on 4L NC  - Monitor off abx, wean O2 as tolerated  - Chest vest, Incetive spirometer, pulmonary toilet, PT, OOB to chair  - Duonebs and HTS given secretions. Likely iso recent PNA and intubation

## 2024-04-18 ENCOUNTER — TRANSCRIPTION ENCOUNTER (OUTPATIENT)
Age: 89
End: 2024-04-18

## 2024-04-18 LAB
ALBUMIN SERPL ELPH-MCNC: 3.1 G/DL — LOW (ref 3.3–5)
ALP SERPL-CCNC: 66 U/L — SIGNIFICANT CHANGE UP (ref 40–120)
ALT FLD-CCNC: 32 U/L — SIGNIFICANT CHANGE UP (ref 10–45)
ANION GAP SERPL CALC-SCNC: 10 MMOL/L — SIGNIFICANT CHANGE UP (ref 5–17)
AST SERPL-CCNC: 23 U/L — SIGNIFICANT CHANGE UP (ref 10–40)
BILIRUB SERPL-MCNC: 0.5 MG/DL — SIGNIFICANT CHANGE UP (ref 0.2–1.2)
BUN SERPL-MCNC: 29 MG/DL — HIGH (ref 7–23)
CALCIUM SERPL-MCNC: 9.5 MG/DL — SIGNIFICANT CHANGE UP (ref 8.4–10.5)
CHLORIDE SERPL-SCNC: 110 MMOL/L — HIGH (ref 96–108)
CO2 SERPL-SCNC: 24 MMOL/L — SIGNIFICANT CHANGE UP (ref 22–31)
CREAT SERPL-MCNC: 0.94 MG/DL — SIGNIFICANT CHANGE UP (ref 0.5–1.3)
CULTURE RESULTS: SIGNIFICANT CHANGE UP
CULTURE RESULTS: SIGNIFICANT CHANGE UP
EGFR: 77 ML/MIN/1.73M2 — SIGNIFICANT CHANGE UP
GLUCOSE BLDC GLUCOMTR-MCNC: 137 MG/DL — HIGH (ref 70–99)
GLUCOSE BLDC GLUCOMTR-MCNC: 146 MG/DL — HIGH (ref 70–99)
GLUCOSE SERPL-MCNC: 126 MG/DL — HIGH (ref 70–99)
HCT VFR BLD CALC: 38.6 % — LOW (ref 39–50)
HGB BLD-MCNC: 12.8 G/DL — LOW (ref 13–17)
MAGNESIUM SERPL-MCNC: 1.8 MG/DL — SIGNIFICANT CHANGE UP (ref 1.6–2.6)
MCHC RBC-ENTMCNC: 30 PG — SIGNIFICANT CHANGE UP (ref 27–34)
MCHC RBC-ENTMCNC: 33.2 GM/DL — SIGNIFICANT CHANGE UP (ref 32–36)
MCV RBC AUTO: 90.4 FL — SIGNIFICANT CHANGE UP (ref 80–100)
NRBC # BLD: 0 /100 WBCS — SIGNIFICANT CHANGE UP (ref 0–0)
PHOSPHATE SERPL-MCNC: 2.8 MG/DL — SIGNIFICANT CHANGE UP (ref 2.5–4.5)
PLATELET # BLD AUTO: 124 K/UL — LOW (ref 150–400)
POTASSIUM SERPL-MCNC: 3.6 MMOL/L — SIGNIFICANT CHANGE UP (ref 3.5–5.3)
POTASSIUM SERPL-SCNC: 3.6 MMOL/L — SIGNIFICANT CHANGE UP (ref 3.5–5.3)
PROT SERPL-MCNC: 5.8 G/DL — LOW (ref 6–8.3)
RBC # BLD: 4.27 M/UL — SIGNIFICANT CHANGE UP (ref 4.2–5.8)
RBC # FLD: 13.7 % — SIGNIFICANT CHANGE UP (ref 10.3–14.5)
SODIUM SERPL-SCNC: 144 MMOL/L — SIGNIFICANT CHANGE UP (ref 135–145)
SPECIMEN SOURCE: SIGNIFICANT CHANGE UP
SPECIMEN SOURCE: SIGNIFICANT CHANGE UP
WBC # BLD: 7.14 K/UL — SIGNIFICANT CHANGE UP (ref 3.8–10.5)
WBC # FLD AUTO: 7.14 K/UL — SIGNIFICANT CHANGE UP (ref 3.8–10.5)

## 2024-04-18 PROCEDURE — 99232 SBSQ HOSP IP/OBS MODERATE 35: CPT | Mod: GC

## 2024-04-18 RX ORDER — AMLODIPINE BESYLATE 2.5 MG/1
1 TABLET ORAL
Qty: 0 | Refills: 0 | DISCHARGE
Start: 2024-04-18

## 2024-04-18 RX ORDER — ACEBUTOLOL HCL 200 MG
1 CAPSULE ORAL
Refills: 0 | DISCHARGE

## 2024-04-18 RX ORDER — TAMSULOSIN HYDROCHLORIDE 0.4 MG/1
1 CAPSULE ORAL
Qty: 0 | Refills: 0 | DISCHARGE
Start: 2024-04-18

## 2024-04-18 RX ORDER — AMLODIPINE BESYLATE 2.5 MG/1
1 TABLET ORAL
Refills: 0 | DISCHARGE

## 2024-04-18 RX ORDER — ATORVASTATIN CALCIUM 80 MG/1
1 TABLET, FILM COATED ORAL
Qty: 0 | Refills: 0 | DISCHARGE
Start: 2024-04-18

## 2024-04-18 RX ORDER — FLUTICASONE PROPIONATE 50 MCG
1 SPRAY, SUSPENSION NASAL
Qty: 0 | Refills: 0 | DISCHARGE
Start: 2024-04-18

## 2024-04-18 RX ORDER — POLYETHYLENE GLYCOL 3350 17 G/17G
17 POWDER, FOR SOLUTION ORAL
Qty: 0 | Refills: 0 | DISCHARGE
Start: 2024-04-18

## 2024-04-18 RX ORDER — VALSARTAN 80 MG/1
1 TABLET ORAL
Qty: 0 | Refills: 0 | DISCHARGE
Start: 2024-04-18

## 2024-04-18 RX ORDER — LISINOPRIL 2.5 MG/1
1 TABLET ORAL
Refills: 0 | DISCHARGE

## 2024-04-18 RX ORDER — SENNA PLUS 8.6 MG/1
1 TABLET ORAL
Qty: 0 | Refills: 0 | DISCHARGE
Start: 2024-04-18

## 2024-04-18 RX ORDER — CARVEDILOL PHOSPHATE 80 MG/1
1 CAPSULE, EXTENDED RELEASE ORAL
Qty: 0 | Refills: 0 | DISCHARGE
Start: 2024-04-18

## 2024-04-18 RX ORDER — ASPIRIN/CALCIUM CARB/MAGNESIUM 324 MG
1 TABLET ORAL
Qty: 0 | Refills: 0 | DISCHARGE
Start: 2024-04-18

## 2024-04-18 RX ORDER — IPRATROPIUM/ALBUTEROL SULFATE 18-103MCG
3 AEROSOL WITH ADAPTER (GRAM) INHALATION
Qty: 0 | Refills: 0 | DISCHARGE
Start: 2024-04-18

## 2024-04-18 RX ORDER — VALSARTAN 80 MG/1
80 TABLET ORAL DAILY
Refills: 0 | Status: DISCONTINUED | OUTPATIENT
Start: 2024-04-18 | End: 2024-04-22

## 2024-04-18 RX ORDER — SODIUM CHLORIDE 9 MG/ML
4 INJECTION INTRAMUSCULAR; INTRAVENOUS; SUBCUTANEOUS
Qty: 0 | Refills: 0 | DISCHARGE
Start: 2024-04-18

## 2024-04-18 RX ADMIN — Medication 3 MILLILITER(S): at 12:00

## 2024-04-18 RX ADMIN — SODIUM CHLORIDE 4 MILLILITER(S): 9 INJECTION INTRAMUSCULAR; INTRAVENOUS; SUBCUTANEOUS at 05:28

## 2024-04-18 RX ADMIN — Medication 1 SPRAY(S): at 05:26

## 2024-04-18 RX ADMIN — Medication 1 SPRAY(S): at 17:35

## 2024-04-18 RX ADMIN — TAMSULOSIN HYDROCHLORIDE 0.4 MILLIGRAM(S): 0.4 CAPSULE ORAL at 22:03

## 2024-04-18 RX ADMIN — Medication 10 MILLIGRAM(S): at 12:00

## 2024-04-18 RX ADMIN — Medication 81 MILLIGRAM(S): at 05:27

## 2024-04-18 RX ADMIN — SACUBITRIL AND VALSARTAN 1 TABLET(S): 24; 26 TABLET, FILM COATED ORAL at 05:28

## 2024-04-18 RX ADMIN — HEPARIN SODIUM 5000 UNIT(S): 5000 INJECTION INTRAVENOUS; SUBCUTANEOUS at 15:06

## 2024-04-18 RX ADMIN — CARVEDILOL PHOSPHATE 12.5 MILLIGRAM(S): 80 CAPSULE, EXTENDED RELEASE ORAL at 17:32

## 2024-04-18 RX ADMIN — Medication 1 SPRAY(S): at 05:27

## 2024-04-18 RX ADMIN — POLYETHYLENE GLYCOL 3350 17 GRAM(S): 17 POWDER, FOR SOLUTION ORAL at 12:00

## 2024-04-18 RX ADMIN — Medication 3 MILLILITER(S): at 05:26

## 2024-04-18 RX ADMIN — HEPARIN SODIUM 5000 UNIT(S): 5000 INJECTION INTRAVENOUS; SUBCUTANEOUS at 22:03

## 2024-04-18 RX ADMIN — Medication 600 MILLIGRAM(S): at 05:27

## 2024-04-18 RX ADMIN — CHLORHEXIDINE GLUCONATE 1 APPLICATION(S): 213 SOLUTION TOPICAL at 05:32

## 2024-04-18 RX ADMIN — HEPARIN SODIUM 5000 UNIT(S): 5000 INJECTION INTRAVENOUS; SUBCUTANEOUS at 05:27

## 2024-04-18 RX ADMIN — SODIUM CHLORIDE 4 MILLILITER(S): 9 INJECTION INTRAMUSCULAR; INTRAVENOUS; SUBCUTANEOUS at 17:32

## 2024-04-18 RX ADMIN — Medication 3 MILLIGRAM(S): at 22:03

## 2024-04-18 RX ADMIN — Medication 600 MILLIGRAM(S): at 17:32

## 2024-04-18 RX ADMIN — SENNA PLUS 1 TABLET(S): 8.6 TABLET ORAL at 12:00

## 2024-04-18 RX ADMIN — ATORVASTATIN CALCIUM 80 MILLIGRAM(S): 80 TABLET, FILM COATED ORAL at 22:03

## 2024-04-18 RX ADMIN — AMLODIPINE BESYLATE 5 MILLIGRAM(S): 2.5 TABLET ORAL at 05:27

## 2024-04-18 RX ADMIN — Medication 3 MILLILITER(S): at 23:47

## 2024-04-18 RX ADMIN — CARVEDILOL PHOSPHATE 12.5 MILLIGRAM(S): 80 CAPSULE, EXTENDED RELEASE ORAL at 05:27

## 2024-04-18 RX ADMIN — Medication 3 MILLILITER(S): at 17:32

## 2024-04-18 NOTE — PROGRESS NOTE ADULT - SUBJECTIVE AND OBJECTIVE BOX
PATIENT: MERNA CAZARES, MRN: [id number]    CHIEF COMPLAINT: Patient is a 89y old  Male who presents with a chief complaint of Hypoxia requiring intubation (17 Apr 2024 07:50)      INTERVAL HISTORY/OVERNIGHT EVENTS: No overnight events.       MEDICATIONS:  MEDICATIONS  (STANDING):  albuterol/ipratropium for Nebulization 3 milliLiter(s) Nebulizer every 6 hours  amLODIPine   Tablet 5 milliGRAM(s) Oral daily  aspirin  chewable 81 milliGRAM(s) Oral daily  atorvastatin 80 milliGRAM(s) Oral every 24 hours  bisacodyl Suppository 10 milliGRAM(s) Rectal daily  carvedilol 12.5 milliGRAM(s) Oral every 12 hours  chlorhexidine 4% Liquid 1 Application(s) Topical <User Schedule>  clopidogrel Tablet 75 milliGRAM(s) Enteral Tube daily  fluticasone propionate 50 MICROgram(s)/spray Nasal Spray 1 Spray(s) Both Nostrils two times a day  guaiFENesin  milliGRAM(s) Oral every 12 hours  heparin   Injectable 5000 Unit(s) SubCutaneous every 8 hours  melatonin 3 milliGRAM(s) Oral at bedtime  polyethylene glycol 3350 17 Gram(s) Oral daily  sacubitril 49 mG/valsartan 51 mG 1 Tablet(s) Oral two times a day  senna 1 Tablet(s) Oral daily  sodium chloride 0.65% Nasal 1 Spray(s) Both Nostrils two times a day  sodium chloride 3%  Inhalation 4 milliLiter(s) Inhalation every 12 hours  tamsulosin 0.4 milliGRAM(s) Oral at bedtime    MEDICATIONS  (PRN):      ALLERGIES: Allergies    iodine (Other)    Intolerances        OBJECTIVE:  ICU Vital Signs Last 24 Hrs  T(C): 36.6 (18 Apr 2024 01:00), Max: 36.7 (17 Apr 2024 09:50)  T(F): 97.9 (18 Apr 2024 01:00), Max: 98.1 (17 Apr 2024 09:50)  HR: 89 (18 Apr 2024 05:26) (54 - 89)  BP: 149/76 (18 Apr 2024 05:26) (134/76 - 149/76)  BP(mean): --  ABP: --  ABP(mean): --  RR: 18 (18 Apr 2024 01:00) (18 - 18)  SpO2: 96% (18 Apr 2024 01:00) (93% - 96%)    O2 Parameters below as of 18 Apr 2024 01:00  Patient On (Oxygen Delivery Method): nasal cannula  O2 Flow (L/min): 2          CAPILLARY BLOOD GLUCOSE      POCT Blood Glucose.: 131 mg/dL (17 Apr 2024 21:35)  POCT Blood Glucose.: 126 mg/dL (17 Apr 2024 16:55)  POCT Blood Glucose.: 133 mg/dL (17 Apr 2024 12:05)    CAPILLARY BLOOD GLUCOSE      POCT Blood Glucose.: 131 mg/dL (17 Apr 2024 21:35)    I&O's Summary    17 Apr 2024 07:01  -  18 Apr 2024 07:00  --------------------------------------------------------  IN: 400 mL / OUT: 400 mL / NET: 0 mL      Daily     Daily     PHYSICAL EXAMINATION:  General: Comfortable, no acute distress, cooperative with exam.  HEENT: PERRLA  Respiratory: CTAB, normal respiratory effort, no coughing, wheezes, crackles, or rales.  CV: RRR, S1S2, no murmurs, rubs or gallops. No JVD. Distal pulses intact.  Abdominal: Soft, nontender, nondistended, no rebound or guarding, normal bowel sounds.  Neurology: AOx3, no focal neuro defects, LUNDBERG x 4.  Extremities: No pitting edema, + Peripheral pulses.      LABS:                          12.1   6.70  )-----------( 104      ( 17 Apr 2024 06:37 )             36.1     04-18    144  |  110<H>  |  29<H>  ----------------------------<  126<H>  3.6   |  24  |  0.94    Ca    9.5      18 Apr 2024 06:57  Phos  2.8     04-18  Mg     1.8     04-18    TPro  5.8<L>  /  Alb  3.1<L>  /  TBili  0.5  /  DBili  x   /  AST  23  /  ALT  32  /  AlkPhos  66  04-18    LIVER FUNCTIONS - ( 18 Apr 2024 06:57 )  Alb: 3.1 g/dL / Pro: 5.8 g/dL / ALK PHOS: 66 U/L / ALT: 32 U/L / AST: 23 U/L / GGT: x                   Urinalysis Basic - ( 18 Apr 2024 06:57 )    Color: x / Appearance: x / SG: x / pH: x  Gluc: 126 mg/dL / Ketone: x  / Bili: x / Urobili: x   Blood: x / Protein: x / Nitrite: x   Leuk Esterase: x / RBC: x / WBC x   Sq Epi: x / Non Sq Epi: x / Bacteria: x       PATIENT: MERNA CAZARES, MRN: [id number]    CHIEF COMPLAINT: Patient is a 89y old  Male who presents with a chief complaint of Hypoxia requiring intubation (17 Apr 2024 07:50)      INTERVAL HISTORY/OVERNIGHT EVENTS: No overnight events. Had chest vest, improved secretions. No SOB. No chest pain. Walked with PT      MEDICATIONS:  MEDICATIONS  (STANDING):  albuterol/ipratropium for Nebulization 3 milliLiter(s) Nebulizer every 6 hours  amLODIPine   Tablet 5 milliGRAM(s) Oral daily  aspirin  chewable 81 milliGRAM(s) Oral daily  atorvastatin 80 milliGRAM(s) Oral every 24 hours  bisacodyl Suppository 10 milliGRAM(s) Rectal daily  carvedilol 12.5 milliGRAM(s) Oral every 12 hours  chlorhexidine 4% Liquid 1 Application(s) Topical <User Schedule>  clopidogrel Tablet 75 milliGRAM(s) Enteral Tube daily  fluticasone propionate 50 MICROgram(s)/spray Nasal Spray 1 Spray(s) Both Nostrils two times a day  guaiFENesin  milliGRAM(s) Oral every 12 hours  heparin   Injectable 5000 Unit(s) SubCutaneous every 8 hours  melatonin 3 milliGRAM(s) Oral at bedtime  polyethylene glycol 3350 17 Gram(s) Oral daily  sacubitril 49 mG/valsartan 51 mG 1 Tablet(s) Oral two times a day  senna 1 Tablet(s) Oral daily  sodium chloride 0.65% Nasal 1 Spray(s) Both Nostrils two times a day  sodium chloride 3%  Inhalation 4 milliLiter(s) Inhalation every 12 hours  tamsulosin 0.4 milliGRAM(s) Oral at bedtime    MEDICATIONS  (PRN):      ALLERGIES: Allergies    iodine (Other)    Intolerances        OBJECTIVE:  ICU Vital Signs Last 24 Hrs  T(C): 36.6 (18 Apr 2024 01:00), Max: 36.7 (17 Apr 2024 09:50)  T(F): 97.9 (18 Apr 2024 01:00), Max: 98.1 (17 Apr 2024 09:50)  HR: 89 (18 Apr 2024 05:26) (54 - 89)  BP: 149/76 (18 Apr 2024 05:26) (134/76 - 149/76)  BP(mean): --  ABP: --  ABP(mean): --  RR: 18 (18 Apr 2024 01:00) (18 - 18)  SpO2: 96% (18 Apr 2024 01:00) (93% - 96%)    O2 Parameters below as of 18 Apr 2024 01:00  Patient On (Oxygen Delivery Method): nasal cannula  O2 Flow (L/min): 2          CAPILLARY BLOOD GLUCOSE      POCT Blood Glucose.: 131 mg/dL (17 Apr 2024 21:35)  POCT Blood Glucose.: 126 mg/dL (17 Apr 2024 16:55)  POCT Blood Glucose.: 133 mg/dL (17 Apr 2024 12:05)    CAPILLARY BLOOD GLUCOSE      POCT Blood Glucose.: 131 mg/dL (17 Apr 2024 21:35)    I&O's Summary    17 Apr 2024 07:01  -  18 Apr 2024 07:00  --------------------------------------------------------  IN: 400 mL / OUT: 400 mL / NET: 0 mL      Daily     Daily     PHYSICAL EXAMINATION:  General: Comfortable, no acute distress, cooperative with exam.  Respiratory: Rhonchi heard throughout, slightly improved, no crackles  CV: RRR, S1S2, no murmurs, rubs or gallops. No JVD. Distal pulses intact.  Abdominal: Soft, nontender, nondistended, no rebound or guarding  Neurology: AOx3, no focal neuro defects, LUNDBERG x 4.  Extremities: No pitting edema      LABS:                          12.1   6.70  )-----------( 104      ( 17 Apr 2024 06:37 )             36.1     04-18    144  |  110<H>  |  29<H>  ----------------------------<  126<H>  3.6   |  24  |  0.94    Ca    9.5      18 Apr 2024 06:57  Phos  2.8     04-18  Mg     1.8     04-18    TPro  5.8<L>  /  Alb  3.1<L>  /  TBili  0.5  /  DBili  x   /  AST  23  /  ALT  32  /  AlkPhos  66  04-18    LIVER FUNCTIONS - ( 18 Apr 2024 06:57 )  Alb: 3.1 g/dL / Pro: 5.8 g/dL / ALK PHOS: 66 U/L / ALT: 32 U/L / AST: 23 U/L / GGT: x                   Urinalysis Basic - ( 18 Apr 2024 06:57 )    Color: x / Appearance: x / SG: x / pH: x  Gluc: 126 mg/dL / Ketone: x  / Bili: x / Urobili: x   Blood: x / Protein: x / Nitrite: x   Leuk Esterase: x / RBC: x / WBC x   Sq Epi: x / Non Sq Epi: x / Bacteria: x

## 2024-04-18 NOTE — PROGRESS NOTE ADULT - PROBLEM SELECTOR PLAN 1
- LLL PNA, intubated 4/11-4/12 for AHRF.  - s/p CTX, Azithro 4/11-4/12, Zosyn 4/13-4/15 for 5-day course  - Now on 4L NC  - Monitor off abx, wean O2 as tolerated  - Chest vest, Incetive spirometer, pulmonary toilet, PT, OOB to chair  - Duonebs and HTS given secretions. Likely iso recent PNA and intubation - LLL PNA, intubated 4/11-4/12 for AHRF.  - s/p CTX, Azithro 4/11-4/12, Zosyn 4/13-4/15 for 5-day course  - Weaning O2  - Monitor off abx, wean O2 as tolerated  - Incentive spirometer, pulmonary toilet, PT, OOB to chair  - Duonebs and HTS given secretions. Likely iso recent PNA and intubation

## 2024-04-18 NOTE — DISCHARGE NOTE PROVIDER - NSFOLLOWUPCLINICS_GEN_ALL_ED_FT
[facility] General Internal Medicine  General Internal Medicine  [address] [id number]  Phone: [phone]  Fax:   Follow Up Time: 1 week     [facility] General Internal Medicine  General Internal Medicine  [address] [place] [address]  Phone: [phone]  Fax:   Follow Up Time: 1 week    Cardiology at [facility]  Cardiology  [address] [place] [address]  Phone: [phone]  Fax:   Follow Up Time: 2 weeks

## 2024-04-18 NOTE — DISCHARGE NOTE PROVIDER - NSDCFUADDAPPT_GEN_ALL_CORE_FT
APPTS ARE READY TO BE MADE: [x] YES    Best Family or Patient Contact (if needed):    Additional Information about above appointments (if needed):    1: PCP for f/u of nodules, HFU  2: Cardiology in the next 2 weeks  3:     Other comments or requests:    APPTS ARE READY TO BE MADE: [x] YES    Best Family or Patient Contact (if needed):    Additional Information about above appointments (if needed):    1: PCP for f/u of nodules, HFU  2: Cardiology in the next 2 weeks  3:     Other comments or requests:       Patient is being discharged to rehab. Caregiver will arrange follow up.

## 2024-04-18 NOTE — DISCHARGE NOTE PROVIDER - NSDCCPCAREPLAN_GEN_ALL_CORE_FT
PRINCIPAL DISCHARGE DIAGNOSIS  Diagnosis: Acute hypoxic respiratory failure  Assessment and Plan of Treatment:       SECONDARY DISCHARGE DIAGNOSES  Diagnosis: Depressed left ventricular ejection fraction  Assessment and Plan of Treatment:     Diagnosis: Gastric nodule  Assessment and Plan of Treatment:     Diagnosis: Thrombocytopenia  Assessment and Plan of Treatment:     Diagnosis: HERBERT (acute kidney injury)  Assessment and Plan of Treatment:      PRINCIPAL DISCHARGE DIAGNOSIS  Diagnosis: Acute hypoxic respiratory failure  Assessment and Plan of Treatment: You came in with pneumonia and had to be intubated due to low oxygen saturations. You were able to be quickly extubated but remained on antibiotics and IV vasopressors to keep your blood pressure high enough. You finished your course of IV antibiotics and came off the IV vasopressors given that your sepsis has now resolved. You will continue to have a cough and mucus production for some time due to your pneumonia and recent intubation. Please return to the hospital if you become very short of breath or develop a significantly worsening cough.      SECONDARY DISCHARGE DIAGNOSES  Diagnosis: Depressed left ventricular ejection fraction  Assessment and Plan of Treatment:     Diagnosis: Gastric nodule  Assessment and Plan of Treatment:     Diagnosis: Thrombocytopenia  Assessment and Plan of Treatment:     Diagnosis: HERBERT (acute kidney injury)  Assessment and Plan of Treatment:      PRINCIPAL DISCHARGE DIAGNOSIS  Diagnosis: Acute hypoxic respiratory failure  Assessment and Plan of Treatment: You came in with pneumonia and had to be intubated due to low oxygen saturations. You were able to be quickly extubated but remained on antibiotics and IV vasopressors to keep your blood pressure high enough. You finished your course of IV antibiotics and came off the IV vasopressors given that your sepsis has now resolved. You will continue to have a cough and mucus production for some time due to your pneumonia and recent intubation. Please return to the hospital if you become very short of breath or develop a significantly worsening cough.      SECONDARY DISCHARGE DIAGNOSES  Diagnosis: Depressed left ventricular ejection fraction  Assessment and Plan of Treatment: Your heart function was decreased when you initially had your infection. This is likely because the stress of the infection caused your heart to work too hard. However, after the infection resolved, your heart function returned to normal.  Please follow-up with a cardiologist on discharge. Return to the hospital if you develop chest pain or shortness of breath.    Diagnosis: Gastric nodule  Assessment and Plan of Treatment: You were found to have an incidental gastric nodule on your CT scan. You would need to have an endoscopy and biopsy to determine what this nodule is. You were also found to have an adrenal nodule. You would need to get more advanced imaging to better characterize this nodule. Please make sure to follow-up with your PCP regarding these issues.    Diagnosis: Thrombocytopenia  Assessment and Plan of Treatment: When you were first admitted, your platelet count was dropping, but it has since recovered. It is likely that this dropped due to your infection, and now that your infection has resolved, your platelets are returning to normal.    Diagnosis: HERBERT (acute kidney injury)  Assessment and Plan of Treatment: You initially had a kidney injury from your infection, but this gradually improved as your sepsis and infection resolved. Please make sure to call your PCP if you are unable to urinate or if you stop making urine altogether.     PRINCIPAL DISCHARGE DIAGNOSIS  Diagnosis: Acute hypoxic respiratory failure  Assessment and Plan of Treatment: You came in with pneumonia and had to be intubated due to low oxygen saturations. You were able to be quickly extubated but remained on antibiotics and IV vasopressors to keep your blood pressure high enough. You finished your course of IV antibiotics and came off the IV vasopressors given that your sepsis has now resolved. You will continue to have a cough and mucus production for some time due to your pneumonia and recent intubation. Please return to the hospital if you become very short of breath or develop a significantly worsening cough.      SECONDARY DISCHARGE DIAGNOSES  Diagnosis: Depressed left ventricular ejection fraction  Assessment and Plan of Treatment: Your heart function was decreased when you initially had your infection. This is likely because the stress of the infection caused your heart to work too hard. However, after the infection resolved, your heart function returned to normal.  Please follow-up with a cardiologist on discharge. Return to the hospital if you develop chest pain or shortness of breath.    Diagnosis: Gastric nodule  Assessment and Plan of Treatment: You were found to have an incidental gastric nodule on your CT scan. You would need to have an endoscopy and biopsy to determine what this nodule is. You were also found to have an adrenal nodule. You would need to get more advanced imaging to better characterize this nodule. Please make sure to follow-up with your PCP regarding these issues.    Diagnosis: Thrombocytopenia  Assessment and Plan of Treatment: When you were first admitted, your platelet count was dropping, but it has since recovered. It is likely that this dropped due to your infection, and now that your infection has resolved, your platelets are returning to normal.    Diagnosis: HERBERT (acute kidney injury)  Assessment and Plan of Treatment: You initially had a kidney injury from your infection, but this gradually improved as your sepsis and infection resolved. Please make sure to call your PCP if you are unable to urinate or if you stop making urine altogether.    Diagnosis: DVT of leg (deep venous thrombosis)  Assessment and Plan of Treatment: You were found to have a DVT of the left leg, likely due to your recent infection and stay in the ICU. You will start on Eliquis. Please make sure to take the higher dose of Eliquis (10mg twice a day) for 1 week, then continue on 5mg twice a day for the next 3 months. Make sure to follow-up with your PCP regarding this medication. If you begin to feel more short of breath, develop worsening swelling, or begin to develop significant bleeding while on Eliquis, please return to the hospital.

## 2024-04-18 NOTE — DISCHARGE NOTE PROVIDER - CARE PROVIDER_API CALL
Marco Cervantes  Nephrology  [address] [place] [id number]  Phone: [phone]  Fax: [phone]  Follow Up Time:

## 2024-04-18 NOTE — DISCHARGE NOTE PROVIDER - NSDCMRMEDTOKEN_GEN_ALL_CORE_FT
acebutolol 400 mg oral capsule: 1 cap(s) orally once a day  amLODIPine 10 mg oral tablet: 1 tab(s) orally once a day  lisinopril 10 mg oral tablet: 1 tab(s) orally once a day   amLODIPine 5 mg oral tablet: 1 tab(s) orally once a day  aspirin 81 mg oral tablet, chewable: 1 tab(s) orally once a day  atorvastatin 80 mg oral tablet: 1 tab(s) orally every 24 hours  bisacodyl 10 mg rectal suppository: 1 suppository(ies) rectal once a day  carvedilol 12.5 mg oral tablet: 1 tab(s) orally every 12 hours  fluticasone 50 mcg/inh nasal spray: 1 spray(s) nasal 2 times a day  guaiFENesin 600 mg oral tablet, extended release: 1 tab(s) orally every 12 hours  ipratropium-albuterol 0.5 mg-2.5 mg/3 mL inhalation solution: 3 milliliter(s) inhaled every 6 hours  polyethylene glycol 3350 oral powder for reconstitution: 17 gram(s) orally once a day  senna leaf extract oral tablet: 1 tab(s) orally once a day  sodium chloride 3% inhalation solution: 4 milliliter(s) inhaled every 12 hours  tamsulosin 0.4 mg oral capsule: 1 cap(s) orally once a day (at bedtime)  valsartan 80 mg oral tablet: 1 tab(s) orally once a day   amLODIPine 5 mg oral tablet: 1 tab(s) orally once a day  aspirin 81 mg oral tablet, chewable: 1 tab(s) orally once a day  atorvastatin 80 mg oral tablet: 1 tab(s) orally every 24 hours  carvedilol 12.5 mg oral tablet: 1 tab(s) orally every 12 hours  fluticasone 50 mcg/inh nasal spray: 1 spray(s) nasal 2 times a day  guaiFENesin 600 mg oral tablet, extended release: 1 tab(s) orally every 12 hours  ipratropium-albuterol 0.5 mg-2.5 mg/3 mL inhalation solution: 3 milliliter(s) inhaled every 6 hours  polyethylene glycol 3350 oral powder for reconstitution: 17 gram(s) orally once a day  sodium chloride 3% inhalation solution: 4 milliliter(s) inhaled every 12 hours  tamsulosin 0.4 mg oral capsule: 1 cap(s) orally once a day (at bedtime)  valsartan 80 mg oral tablet: 1 tab(s) orally once a day

## 2024-04-18 NOTE — CHART NOTE - NSCHARTNOTEFT_GEN_A_CORE
Spoke to patient and family regarding GOC: patient would like to remain full code. Regarding his adrenal nodule and gastric nodule, he would be amenable to workup of adrenal nodule with MRI as an outpatient, but does not feel the need to undergo workup for the gastric nodule given that this would require endoscopy.     For Hernandez acceptance: patient having partially formed BMs. Workup of nodules can be done outpatient. Does not have to happen at rehab or inpatient.

## 2024-04-18 NOTE — PROGRESS NOTE ADULT - ATTENDING COMMENTS
89-yo M PMHx of HTN, BPPV, presenting for emesis and diarrhea, found to be in AHRF 2/2 LLL PNA s/p intubation 4/11-4/12, c/b MARY likely due to demand.     #Acute hypoxic respiratory failure   #gram negative PNA  #STEMI vs demand ischemia   #acute systolic heart failure with reduced EF   #EPEC colitis    - Pt s/p 5d course of antibiotics for PNA; currently on 1-2NC - aggressive pulmonary toilet, wean O2 as tolerated  - also found with EPEC on GI PCR, not having any diarrhea currently, no abd pain, tolerating diet; resolved.   - noted with MARY/elevated troponin, with TTE showing depressed EF, cardiology following - suspect likely 2/2 demand. s/p DAPT and 48hrs of heparin gtt.   - repeat TTE (4/17) showing normal EF, no WMA - likely prior changes were 2/2 acute illness/sepsis. Will d/c plavix, c/w ASA, statin, c/w coreg, switch entresto to valsartan   - urinary retention s/p mckee - passed TOV  - thrombocytopenia likely 2/2 critical illness, now improving, c/t monitor   - HERBERT 2/2 sepsis, improving   - gastric nodule, adrenal nodule - family aware, further w/u as outpt if within GOC    d/c planning - pt debating between RON vs home with aides

## 2024-04-18 NOTE — PROGRESS NOTE ADULT - ASSESSMENT
89-yo M PMHx of HTN, BPPV, presenting for emesis and diarrhea, found to be in AHRF 2/2 LLL PNA req. intubation 4/11-4/12, c/b MARY likely due to demand, now on 4L NC and downgraded to floors for monitoring, uptitration of GDMT, and physical therapy.  89-yo M PMHx of HTN, BPPV, presenting for emesis and diarrhea, found to be in AHRF 2/2 LLL PNA req. intubation 4/11-4/12 now completed IV abx. Course c/b MARY likely due to demand, WMAs resolved on repeat TTE. Now downtitrating oxygen and downgraded to floors for monitoring.

## 2024-04-18 NOTE — DISCHARGE NOTE PROVIDER - NSDCCPTREATMENT_GEN_ALL_CORE_FT
PRINCIPAL PROCEDURE  Procedure: CT chest, abdomen and pelvis  Findings and Treatment: IMPRESSION:  Scattered bilateral groundglass opacities and left lower lobe   consolidation, concerning for pneumonia.  Debris within the bilateral lower lobe bronchi and obliteration of the   left lower lobe bronchus.  No bowel obstruction. Colitis involving the ascending, transverse and   proximal descending colon.  Partially calcified nodule along the posterior gastric wall, concerning   for neoplasm. Endoscopy suggested for further evaluation.  Left adrenal 9 mm hypodense nodule. Consider nonemergent dedicated   adrenal CT or MRI for further evaluation.        SECONDARY PROCEDURE  Procedure: Transthoracic echocardiography (TTE)  Findings and Treatment: CONCLUSIONS:      1. Left ventricular systolic function is normal with an ejection fraction of 54 % by Sevilla's method of disks.   2. The right ventricle is not well visualized.   3. No pericardial effusion seen.   4. Compared to the transthoracic echocardiogram performed on 4/12/2024, no wall motion abnormality noted.   5. There is no evidence of a left ventricular thrombus.      Procedure: CT head  Findings and Treatment: IMPRESSION: Age-appropriate involutional and ischemic gliotic changes. No   hemorrhage. Right anterior temporal arachnoid cyst. No change since 6:32   AM.

## 2024-04-18 NOTE — PROGRESS NOTE ADULT - PROBLEM SELECTOR PLAN 9
- Pt w/ adrenal nodule seen on CTAP   - Warrants dedicated Adrenal CT or MRI for further eval.   - To be discussed with family and patient together

## 2024-04-18 NOTE — DISCHARGE NOTE PROVIDER - HOSPITAL COURSE
HPI:  89yo male with pasty medical history of HTN, benign postural vertigo, presenting with diffuse watery diarrhea x 1 day duration. Diarrheal episodes and non-bloody, non-bilious emesis started after having vegetable soup and turkey sandwich for dinner. Patient reports multiple episodes of loose watery diarrhea, unable to control, and had an episode of watery stool in ED. Patient was noted to be hypoxic with crackles and wheezing, hypoxic to 80% on room air, requiring non-rebreather 10L, patient denies any food or drug allergies, no recent medication changes, no antibiotics, fevers, or chills, no recent travels, no sick contacts, no rashes. Patient subsequently continued to be hypoxic while on NRB, and subsequently intubated while in emergency room. Patient currently admitted to MICU in the setting of acute hypoxic respiratory failure requiring intubation.  (11 Apr 2024 04:55)    Hospital Course:    Patient was intubated and started on propofol in the ICU in the setting of AHRF from his PNA. CT Chest was consistent with PNA. Epinephrine and levophed were started, although these were able to be weaned. Initially started on CTX/azithromycin, but due to persistent fevers, the patient was broadened to zosyn to complete a 5 day course of abx. Around this time, patient developed ST elevations on telemetry and 12 lead EKG. Thought to be demand related ischemia, with echocardiogram showing regional wall motion abnormalities and reduced EF. Medically managed with heparin drip, although this was discontinued soon after initiation. Echo was reperformed and showed improvement in EF with resolution of WMAs after sepsis was resolved.    Patient's course was complicated by thrombocytopenia, which coincidentally resolved after discontinuation of heparin drip, although HIT panel was unremarkable. Thought to be related to critical illness and bone marrow suppression. Course also complicated by HERBERT 2/2 septic shock, although this recovered gradually. Mckee was initially placed due to concerns for retention, but mckee was soon removed and patient passed TOV.    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 A hypodense adrenal nodule was also found.    Patient is now hemodynamically stable and medically ready for discharge.    Important Medication Changes and Reason:    Continue entresto  Continue coreg    Active or Pending Issues Requiring Follow-up:  Follow-up with your PCP regarding your gastric mass and adrenal nodule    Advanced Directives:   [ x ] Full code  [ ] DNR  [ ] Hospice    Discharge Diagnoses:  Septic Shock  Acute hypoxic respiratory failure  pneumonia  HERBERT  Thrombocytopenia         HPI:  89yo male with pasty medical history of HTN, benign postural vertigo, presenting with diffuse watery diarrhea x 1 day duration. Diarrheal episodes and non-bloody, non-bilious emesis started after having vegetable soup and turkey sandwich for dinner. Patient reports multiple episodes of loose watery diarrhea, unable to control, and had an episode of watery stool in ED. Patient was noted to be hypoxic with crackles and wheezing, hypoxic to 80% on room air, requiring non-rebreather 10L, patient denies any food or drug allergies, no recent medication changes, no antibiotics, fevers, or chills, no recent travels, no sick contacts, no rashes. Patient subsequently continued to be hypoxic while on NRB, and subsequently intubated while in emergency room. Patient currently admitted to MICU in the setting of acute hypoxic respiratory failure requiring intubation.  (11 Apr 2024 04:55)    Hospital Course:    Patient was intubated and started on propofol in the ICU in the setting of AHRF from his PNA. CT Chest was consistent with PNA. Epinephrine and levophed were started, although these were able to be weaned. Initially started on CTX/azithromycin, but due to persistent fevers, the patient was broadened to zosyn to complete a 5 day course of abx. Around this time, patient developed ST elevations on telemetry and 12 lead EKG. Thought to be demand related ischemia, with echocardiogram showing regional wall motion abnormalities and reduced EF. Medically managed with heparin drip, although this was discontinued soon after initiation. Echo was reperformed and showed improvement in EF with resolution of WMAs after sepsis was resolved.    Patient's course was complicated by thrombocytopenia, which coincidentally resolved after discontinuation of heparin drip, although HIT panel was unremarkable. Thought to be related to critical illness and bone marrow suppression. Course also complicated by HERBERT 2/2 septic shock, although this recovered gradually. Mckee was initially placed due to concerns for retention, but mckee was soon removed and patient passed TOV.    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 A hypodense adrenal nodule was also found.    Patient is now hemodynamically stable and medically ready for discharge.    Important Medication Changes and Reason:    Start coreg and valsartan    Active or Pending Issues Requiring Follow-up:  Follow-up with your PCP regarding your gastric mass and adrenal nodule    Advanced Directives:   [ x ] Full code  [ ] DNR  [ ] Hospice    Discharge Diagnoses:  Septic Shock  Acute hypoxic respiratory failure  pneumonia  HERBERT  Thrombocytopenia         HPI:  89yo male with pasty medical history of HTN, benign postural vertigo, presenting with diffuse watery diarrhea x 1 day duration. Diarrheal episodes and non-bloody, non-bilious emesis started after having vegetable soup and turkey sandwich for dinner. Patient reports multiple episodes of loose watery diarrhea, unable to control, and had an episode of watery stool in ED. Patient was noted to be hypoxic with crackles and wheezing, hypoxic to 80% on room air, requiring non-rebreather 10L, patient denies any food or drug allergies, no recent medication changes, no antibiotics, fevers, or chills, no recent travels, no sick contacts, no rashes. Patient subsequently continued to be hypoxic while on NRB, and subsequently intubated while in emergency room. Patient currently admitted to MICU in the setting of acute hypoxic respiratory failure requiring intubation.  (11 Apr 2024 04:55)    Hospital Course:    Patient was intubated and started on propofol in the ICU in the setting of AHRF from his PNA. CT Chest was consistent with PNA. Epinephrine and levophed were started, although these were able to be weaned. Initially started on CTX/azithromycin, but due to persistent fevers, the patient was broadened to zosyn to complete a 5 day course of abx. Around this time, patient developed ST elevations on telemetry and 12 lead EKG. Thought to be demand related ischemia, with echocardiogram showing regional wall motion abnormalities and reduced EF. Medically managed with heparin drip, although this was discontinued soon after initiation. Echo was reperformed and showed improvement in EF with resolution of WMAs after sepsis was resolved.    Patient's course was complicated by thrombocytopenia, which coincidentally resolved after discontinuation of heparin drip, although HIT panel was unremarkable. Thought to be related to critical illness and bone marrow suppression. Course also complicated by HERBERT 2/2 septic shock, although this recovered gradually. Mckee was initially placed due to concerns for retention, but mckee was soon removed and patient passed TOV.    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 A hypodense adrenal nodule was also found.    Patient developed L soleal DVT, and was started on Eliquis on discharge.    Patient is now hemodynamically stable and medically ready for discharge.    Important Medication Changes and Reason:    Start coreg and valsartan  Start Eliquis    Active or Pending Issues Requiring Follow-up:  Follow-up with your PCP regarding your gastric mass and adrenal nodule. Please also follow-up with your PCP regarding your Eliquis for your DVT.  Please follow-up with cardiology given your recent myocardial injury    Advanced Directives:   [ x ] Full code  [ ] DNR  [ ] Hospice    Discharge Diagnoses:  Septic Shock  Acute hypoxic respiratory failure  pneumonia  HERBERT  Thrombocytopenia  DVT         HPI:  89yo male with pasty medical history of HTN, benign postural vertigo, presenting with diffuse watery diarrhea x 1 day duration. Diarrheal episodes and non-bloody, non-bilious emesis started after having vegetable soup and turkey sandwich for dinner. Patient reports multiple episodes of loose watery diarrhea, unable to control, and had an episode of watery stool in ED. Patient was noted to be hypoxic with crackles and wheezing, hypoxic to 80% on room air, requiring non-rebreather 10L, patient denies any food or drug allergies, no recent medication changes, no antibiotics, fevers, or chills, no recent travels, no sick contacts, no rashes. Patient subsequently continued to be hypoxic while on NRB, and subsequently intubated while in emergency room. Patient currently admitted to MICU in the setting of acute hypoxic respiratory failure requiring intubation.  (11 Apr 2024 04:55)    Hospital Course:    Patient was intubated and started on propofol in the ICU in the setting of AHRF from his PNA. CT Chest was consistent with PNA. Epinephrine and levophed were started, although these were able to be weaned. Initially started on CTX/azithromycin, but due to persistent fevers, the patient was broadened to zosyn to complete a 5 day course of abx. Around this time, patient developed ST elevations on telemetry and 12 lead EKG. Thought to be demand related ischemia, with echocardiogram showing regional wall motion abnormalities and reduced EF. Medically managed with heparin drip, although this was discontinued soon after initiation. Echo was reperformed and showed improvement in EF with resolution of WMAs after sepsis was resolved.    Patient's course was complicated by thrombocytopenia, which coincidentally resolved after discontinuation of heparin drip, although HIT panel was unremarkable. Thought to be related to critical illness and bone marrow suppression. Course also complicated by HERBERT 2/2 septic shock, although this recovered gradually. Mckee was initially placed due to concerns for retention, but mckee was soon removed and patient passed TOV.    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 A hypodense adrenal nodule was also found. This was discussed with pt and family, who opted for outpt work-up as pt wishes.     Patient developed L soleal DVT, and was started on Eliquis on discharge.    Patient is now hemodynamically stable and medically ready for discharge.    Important Medication Changes and Reason:    Start coreg and valsartan  Start Eliquis    Active or Pending Issues Requiring Follow-up:  Follow-up with your PCP regarding your gastric mass and adrenal nodule. Please also follow-up with your PCP regarding your Eliquis for your DVT.  Please follow-up with cardiology given your recent myocardial injury    Advanced Directives:   [ x ] Full code  [ ] DNR  [ ] Hospice    Discharge Diagnoses:  Septic Shock  Acute hypoxic respiratory failure  pneumonia  HERBERT  Thrombocytopenia  DVT

## 2024-04-18 NOTE — PROGRESS NOTE ADULT - PROBLEM SELECTOR PLAN 8
- CTAP w/ nodule in gastric body  - EGD eval for neoplasm however pt 89 yo and family wishes not to tell this to patient as would likely not accept chemo.  - Likely would defer further evaluation, if within GOC of patient

## 2024-04-18 NOTE — PROGRESS NOTE ADULT - PROBLEM SELECTOR PLAN 3
- Pt w/ new LVEF w/o documented hx of HF.   - TTE w/ abnormal apex, LVEF mildly decreased.  - Uptitrating entresto, switching from metoprolol to coreg given high BP and uptitrating GDMT  - Cardiology consulted, appreciate recs - Pt w/ depressed LVEF after septic shock, now recovered, likely demand related changes.  - Changing entresto to valsartan given no need for HFrEF medication, HF recovered after acute septic shock. Valsartan for BP  - Cardiology consulted, appreciate recs

## 2024-04-18 NOTE — PROGRESS NOTE ADULT - PROBLEM SELECTOR PLAN 10
- Continue amlodipine, entresto, metoprolol - Continue amlodipine  - Transitioned to coreg and valsartan

## 2024-04-18 NOTE — PROGRESS NOTE ADULT - PROBLEM SELECTOR PLAN 2
- MARY in inferolateral leads, w/ reduced LVEF  - Troponin peaked at 4/14 in 1300s, treated w/ heparin for 48 hrs per cards recommendations and aspirin/plavix load  - Cardiology consulted, appreciate recommendations. Suspect demand. Did have WMA on echo. Would only receive medical management regardless. Repeat TTE to assess resolution now that sepsis resolved  - Continue ASA/Plavix, Atorvastatin 80 mg daily  - Monitor for clinical chest pain - MARY in inferolateral leads, w/ reduced LVEF. Now recovered on repeat TTE. Likely demand related iso septic shock  - Cardiology consulted, appreciate recommendations. D/C plavix, continue aspirin and statin

## 2024-04-19 LAB
ALBUMIN SERPL ELPH-MCNC: 2.9 G/DL — LOW (ref 3.3–5)
ALP SERPL-CCNC: 72 U/L — SIGNIFICANT CHANGE UP (ref 40–120)
ALT FLD-CCNC: 50 U/L — HIGH (ref 10–45)
ANION GAP SERPL CALC-SCNC: 12 MMOL/L — SIGNIFICANT CHANGE UP (ref 5–17)
AST SERPL-CCNC: 35 U/L — SIGNIFICANT CHANGE UP (ref 10–40)
BILIRUB SERPL-MCNC: 0.4 MG/DL — SIGNIFICANT CHANGE UP (ref 0.2–1.2)
BUN SERPL-MCNC: 32 MG/DL — HIGH (ref 7–23)
CALCIUM SERPL-MCNC: 9.7 MG/DL — SIGNIFICANT CHANGE UP (ref 8.4–10.5)
CHLORIDE SERPL-SCNC: 110 MMOL/L — HIGH (ref 96–108)
CO2 SERPL-SCNC: 22 MMOL/L — SIGNIFICANT CHANGE UP (ref 22–31)
CREAT SERPL-MCNC: 1 MG/DL — SIGNIFICANT CHANGE UP (ref 0.5–1.3)
EGFR: 71 ML/MIN/1.73M2 — SIGNIFICANT CHANGE UP
GLUCOSE SERPL-MCNC: 107 MG/DL — HIGH (ref 70–99)
HCT VFR BLD CALC: 38.8 % — LOW (ref 39–50)
HGB BLD-MCNC: 12.8 G/DL — LOW (ref 13–17)
MAGNESIUM SERPL-MCNC: 2 MG/DL — SIGNIFICANT CHANGE UP (ref 1.6–2.6)
MCHC RBC-ENTMCNC: 30 PG — SIGNIFICANT CHANGE UP (ref 27–34)
MCHC RBC-ENTMCNC: 33 GM/DL — SIGNIFICANT CHANGE UP (ref 32–36)
MCV RBC AUTO: 91.1 FL — SIGNIFICANT CHANGE UP (ref 80–100)
NRBC # BLD: 0 /100 WBCS — SIGNIFICANT CHANGE UP (ref 0–0)
PHOSPHATE SERPL-MCNC: 3.1 MG/DL — SIGNIFICANT CHANGE UP (ref 2.5–4.5)
PLATELET # BLD AUTO: 130 K/UL — LOW (ref 150–400)
POTASSIUM SERPL-MCNC: 3.8 MMOL/L — SIGNIFICANT CHANGE UP (ref 3.5–5.3)
POTASSIUM SERPL-SCNC: 3.8 MMOL/L — SIGNIFICANT CHANGE UP (ref 3.5–5.3)
PROT SERPL-MCNC: 6 G/DL — SIGNIFICANT CHANGE UP (ref 6–8.3)
RBC # BLD: 4.26 M/UL — SIGNIFICANT CHANGE UP (ref 4.2–5.8)
RBC # FLD: 13.7 % — SIGNIFICANT CHANGE UP (ref 10.3–14.5)
SODIUM SERPL-SCNC: 144 MMOL/L — SIGNIFICANT CHANGE UP (ref 135–145)
WBC # BLD: 8.81 K/UL — SIGNIFICANT CHANGE UP (ref 3.8–10.5)
WBC # FLD AUTO: 8.81 K/UL — SIGNIFICANT CHANGE UP (ref 3.8–10.5)

## 2024-04-19 PROCEDURE — 99232 SBSQ HOSP IP/OBS MODERATE 35: CPT | Mod: GC

## 2024-04-19 RX ADMIN — VALSARTAN 80 MILLIGRAM(S): 80 TABLET ORAL at 05:53

## 2024-04-19 RX ADMIN — AMLODIPINE BESYLATE 5 MILLIGRAM(S): 2.5 TABLET ORAL at 05:53

## 2024-04-19 RX ADMIN — Medication 1 SPRAY(S): at 05:54

## 2024-04-19 RX ADMIN — Medication 81 MILLIGRAM(S): at 05:53

## 2024-04-19 RX ADMIN — SODIUM CHLORIDE 4 MILLILITER(S): 9 INJECTION INTRAMUSCULAR; INTRAVENOUS; SUBCUTANEOUS at 17:10

## 2024-04-19 RX ADMIN — TAMSULOSIN HYDROCHLORIDE 0.4 MILLIGRAM(S): 0.4 CAPSULE ORAL at 21:48

## 2024-04-19 RX ADMIN — CARVEDILOL PHOSPHATE 12.5 MILLIGRAM(S): 80 CAPSULE, EXTENDED RELEASE ORAL at 17:16

## 2024-04-19 RX ADMIN — Medication 600 MILLIGRAM(S): at 05:53

## 2024-04-19 RX ADMIN — Medication 600 MILLIGRAM(S): at 17:10

## 2024-04-19 RX ADMIN — Medication 1 SPRAY(S): at 17:11

## 2024-04-19 RX ADMIN — SODIUM CHLORIDE 4 MILLILITER(S): 9 INJECTION INTRAMUSCULAR; INTRAVENOUS; SUBCUTANEOUS at 05:52

## 2024-04-19 RX ADMIN — HEPARIN SODIUM 5000 UNIT(S): 5000 INJECTION INTRAVENOUS; SUBCUTANEOUS at 05:53

## 2024-04-19 RX ADMIN — Medication 3 MILLILITER(S): at 17:10

## 2024-04-19 RX ADMIN — Medication 3 MILLILITER(S): at 05:51

## 2024-04-19 RX ADMIN — POLYETHYLENE GLYCOL 3350 17 GRAM(S): 17 POWDER, FOR SOLUTION ORAL at 11:29

## 2024-04-19 RX ADMIN — HEPARIN SODIUM 5000 UNIT(S): 5000 INJECTION INTRAVENOUS; SUBCUTANEOUS at 21:48

## 2024-04-19 RX ADMIN — Medication 3 MILLIGRAM(S): at 21:48

## 2024-04-19 RX ADMIN — HEPARIN SODIUM 5000 UNIT(S): 5000 INJECTION INTRAVENOUS; SUBCUTANEOUS at 13:30

## 2024-04-19 RX ADMIN — CHLORHEXIDINE GLUCONATE 1 APPLICATION(S): 213 SOLUTION TOPICAL at 06:01

## 2024-04-19 RX ADMIN — ATORVASTATIN CALCIUM 80 MILLIGRAM(S): 80 TABLET, FILM COATED ORAL at 21:48

## 2024-04-19 RX ADMIN — Medication 3 MILLILITER(S): at 11:28

## 2024-04-19 NOTE — PROGRESS NOTE ADULT - PROBLEM SELECTOR PLAN 1
- LLL PNA, intubated 4/11-4/12 for AHRF.  - s/p CTX, Azithro 4/11-4/12, Zosyn 4/13-4/15 for 5-day course  - Weaning O2  - Monitor off abx, wean O2 as tolerated  - Incentive spirometer, pulmonary toilet, PT, OOB to chair  - Duonebs and HTS given secretions. Likely iso recent PNA and intubation

## 2024-04-19 NOTE — PROGRESS NOTE ADULT - PROBLEM SELECTOR PLAN 8
- CTAP w/ nodule in gastric body  - EGD eval for neoplasm however pt 89 yo and family wishes not to tell this to patient as would likely not accept chemo.  - Likely would defer further evaluation, if within GOC of patient - CTAP w/ nodule in gastric body  - EGD eval for neoplasm, but patient deferring workup at this time. Would like to think more about this

## 2024-04-19 NOTE — PROGRESS NOTE ADULT - PROBLEM SELECTOR PLAN 9
- Pt w/ adrenal nodule seen on CTAP   - Warrants dedicated Adrenal CT or MRI for further eval.   - To be discussed with family and patient together - Pt w/ adrenal nodule seen on CTAP   - Warrants dedicated Adrenal CT or MRI for further eval.   - Patient would potentially want workup for this. He would not need this until outpatient and discharged from rehab.

## 2024-04-19 NOTE — PROGRESS NOTE ADULT - ASSESSMENT
89-yo M PMHx of HTN, BPPV, presenting for emesis and diarrhea, found to be in AHRF 2/2 LLL PNA req. intubation 4/11-4/12 now completed IV abx. Course c/b MARY likely due to demand, WMAs resolved on repeat TTE. Now downtitrating oxygen and downgraded to floors for monitoring.

## 2024-04-19 NOTE — PROGRESS NOTE ADULT - PROBLEM SELECTOR PLAN 3
- Pt w/ depressed LVEF after septic shock, now recovered, likely demand related changes.  - Changing entresto to valsartan given no need for HFrEF medication, HF recovered after acute septic shock. Valsartan for BP  - Cardiology consulted, appreciate recs

## 2024-04-19 NOTE — PROGRESS NOTE ADULT - ATTENDING COMMENTS
89-yo M PMHx of HTN, BPPV, presenting for emesis and diarrhea, found to be in AHRF 2/2 LLL PNA s/p intubation 4/11-4/12, c/b MARY likely due to demand.     #Acute hypoxic respiratory failure   #gram negative PNA  #STEMI vs demand ischemia   #acute systolic heart failure with reduced EF   #EPEC colitis    - Pt s/p 5d course of antibiotics for PNA; currently on 1-2LNC - aggressive pulmonary toilet, wean O2 as tolerated  - also found with EPEC on GI PCR, not having any diarrhea currently, no abd pain, tolerating diet; resolved.   - noted with MARY/elevated troponin, with TTE showing depressed EF, cardiology following - suspect likely 2/2 demand. s/p DAPT and 48hrs of heparin gtt.   - repeat TTE (4/17) showing normal EF, no WMA - likely prior changes were 2/2 acute illness/sepsis. Will d/c plavix, c/w ASA, statin, c/w coreg, switch entresto to valsartan   - urinary retention s/p mckee - passed TOV  - thrombocytopenia likely 2/2 critical illness, now improving, c/t monitor   - HERBERT 2/2 sepsis, improving   - gastric nodule, adrenal nodule - pt and family aware, further w/u as outpt if within GOC    d/c planning

## 2024-04-19 NOTE — PROGRESS NOTE ADULT - SUBJECTIVE AND OBJECTIVE BOX
PATIENT: MERNA CAZARES, MRN: [id number]    CHIEF COMPLAINT: Patient is a 89y old  Male who presents with a chief complaint of Hypoxia requiring intubation (18 Apr 2024 07:49)      INTERVAL HISTORY/OVERNIGHT EVENTS: No overnight events.       MEDICATIONS:  MEDICATIONS  (STANDING):  albuterol/ipratropium for Nebulization 3 milliLiter(s) Nebulizer every 6 hours  amLODIPine   Tablet 5 milliGRAM(s) Oral daily  aspirin  chewable 81 milliGRAM(s) Oral daily  atorvastatin 80 milliGRAM(s) Oral every 24 hours  bisacodyl Suppository 10 milliGRAM(s) Rectal daily  carvedilol 12.5 milliGRAM(s) Oral every 12 hours  chlorhexidine 4% Liquid 1 Application(s) Topical <User Schedule>  fluticasone propionate 50 MICROgram(s)/spray Nasal Spray 1 Spray(s) Both Nostrils two times a day  guaiFENesin  milliGRAM(s) Oral every 12 hours  heparin   Injectable 5000 Unit(s) SubCutaneous every 8 hours  melatonin 3 milliGRAM(s) Oral at bedtime  polyethylene glycol 3350 17 Gram(s) Oral daily  senna 1 Tablet(s) Oral daily  sodium chloride 0.65% Nasal 1 Spray(s) Both Nostrils two times a day  sodium chloride 3%  Inhalation 4 milliLiter(s) Inhalation every 12 hours  tamsulosin 0.4 milliGRAM(s) Oral at bedtime  valsartan 80 milliGRAM(s) Oral daily    MEDICATIONS  (PRN):      ALLERGIES: Allergies    iodine (Other)    Intolerances        OBJECTIVE:  ICU Vital Signs Last 24 Hrs  T(C): 36.6 (19 Apr 2024 08:38), Max: 36.8 (18 Apr 2024 16:00)  T(F): 97.8 (19 Apr 2024 08:38), Max: 98.2 (18 Apr 2024 16:00)  HR: 65 (19 Apr 2024 08:38) (58 - 74)  BP: 132/76 (19 Apr 2024 08:38) (115/71 - 145/76)  BP(mean): --  ABP: --  ABP(mean): --  RR: 18 (19 Apr 2024 08:38) (16 - 18)  SpO2: 95% (19 Apr 2024 08:38) (95% - 95%)    O2 Parameters below as of 19 Apr 2024 08:38  Patient On (Oxygen Delivery Method): nasal cannula  O2 Flow (L/min): 2          CAPILLARY BLOOD GLUCOSE      POCT Blood Glucose.: 137 mg/dL (18 Apr 2024 21:29)  POCT Blood Glucose.: 146 mg/dL (18 Apr 2024 16:48)    CAPILLARY BLOOD GLUCOSE      POCT Blood Glucose.: 137 mg/dL (18 Apr 2024 21:29)    I&O's Summary    18 Apr 2024 07:01  -  19 Apr 2024 07:00  --------------------------------------------------------  IN: 490 mL / OUT: 500 mL / NET: -10 mL      Daily     Daily     PHYSICAL EXAMINATION:  General: Comfortable, no acute distress, cooperative with exam.  HEENT: PERRLA  Respiratory: CTAB, normal respiratory effort, no coughing, wheezes, crackles, or rales.  CV: RRR, S1S2, no murmurs, rubs or gallops. No JVD. Distal pulses intact.  Abdominal: Soft, nontender, nondistended, no rebound or guarding, normal bowel sounds.  Neurology: AOx3, no focal neuro defects, LUNDBERG x 4.  Extremities: No pitting edema, + Peripheral pulses.      LABS:                          12.8   8.81  )-----------( 130      ( 19 Apr 2024 07:15 )             38.8     04-19    144  |  110<H>  |  32<H>  ----------------------------<  107<H>  3.8   |  22  |  1.00    Ca    9.7      19 Apr 2024 07:16  Phos  3.1     04-19  Mg     2.0     04-19    TPro  6.0  /  Alb  2.9<L>  /  TBili  0.4  /  DBili  x   /  AST  35  /  ALT  50<H>  /  AlkPhos  72  04-19    LIVER FUNCTIONS - ( 19 Apr 2024 07:16 )  Alb: 2.9 g/dL / Pro: 6.0 g/dL / ALK PHOS: 72 U/L / ALT: 50 U/L / AST: 35 U/L / GGT: x                   Urinalysis Basic - ( 19 Apr 2024 07:16 )    Color: x / Appearance: x / SG: x / pH: x  Gluc: 107 mg/dL / Ketone: x  / Bili: x / Urobili: x   Blood: x / Protein: x / Nitrite: x   Leuk Esterase: x / RBC: x / WBC x   Sq Epi: x / Non Sq Epi: x / Bacteria: x       PATIENT: MERNA CAZARES, MRN: [id number]    CHIEF COMPLAINT: Patient is a 89y old  Male who presents with a chief complaint of Hypoxia requiring intubation (18 Apr 2024 07:49)      INTERVAL HISTORY/OVERNIGHT EVENTS: No overnight events. Patient without SOB, did have semi-formed BM this AM. Still significant amount of phlegm. Leaning towards rehab.      MEDICATIONS:  MEDICATIONS  (STANDING):  albuterol/ipratropium for Nebulization 3 milliLiter(s) Nebulizer every 6 hours  amLODIPine   Tablet 5 milliGRAM(s) Oral daily  aspirin  chewable 81 milliGRAM(s) Oral daily  atorvastatin 80 milliGRAM(s) Oral every 24 hours  bisacodyl Suppository 10 milliGRAM(s) Rectal daily  carvedilol 12.5 milliGRAM(s) Oral every 12 hours  chlorhexidine 4% Liquid 1 Application(s) Topical <User Schedule>  fluticasone propionate 50 MICROgram(s)/spray Nasal Spray 1 Spray(s) Both Nostrils two times a day  guaiFENesin  milliGRAM(s) Oral every 12 hours  heparin   Injectable 5000 Unit(s) SubCutaneous every 8 hours  melatonin 3 milliGRAM(s) Oral at bedtime  polyethylene glycol 3350 17 Gram(s) Oral daily  senna 1 Tablet(s) Oral daily  sodium chloride 0.65% Nasal 1 Spray(s) Both Nostrils two times a day  sodium chloride 3%  Inhalation 4 milliLiter(s) Inhalation every 12 hours  tamsulosin 0.4 milliGRAM(s) Oral at bedtime  valsartan 80 milliGRAM(s) Oral daily    MEDICATIONS  (PRN):      ALLERGIES: Allergies    iodine (Other)    Intolerances        OBJECTIVE:  ICU Vital Signs Last 24 Hrs  T(C): 36.6 (19 Apr 2024 08:38), Max: 36.8 (18 Apr 2024 16:00)  T(F): 97.8 (19 Apr 2024 08:38), Max: 98.2 (18 Apr 2024 16:00)  HR: 65 (19 Apr 2024 08:38) (58 - 74)  BP: 132/76 (19 Apr 2024 08:38) (115/71 - 145/76)  BP(mean): --  ABP: --  ABP(mean): --  RR: 18 (19 Apr 2024 08:38) (16 - 18)  SpO2: 95% (19 Apr 2024 08:38) (95% - 95%)    O2 Parameters below as of 19 Apr 2024 08:38  Patient On (Oxygen Delivery Method): nasal cannula  O2 Flow (L/min): 2          CAPILLARY BLOOD GLUCOSE      POCT Blood Glucose.: 137 mg/dL (18 Apr 2024 21:29)  POCT Blood Glucose.: 146 mg/dL (18 Apr 2024 16:48)    CAPILLARY BLOOD GLUCOSE      POCT Blood Glucose.: 137 mg/dL (18 Apr 2024 21:29)    I&O's Summary    18 Apr 2024 07:01  -  19 Apr 2024 07:00  --------------------------------------------------------  IN: 490 mL / OUT: 500 mL / NET: -10 mL      Daily     Daily     PHYSICAL EXAMINATION:  General: Comfortable, no acute distress, cooperative with exam.  Respiratory: Rhonchi heard throughout, upper airway sounds heard throughout, sputum production  CV: RRR, S1S2, no murmurs, rubs or gallops.  Abdominal: Soft, nontender, nondistended, no rebound or guarding  Neurology: no focal neuro defects, LUNDBERG x 4.  Extremities: No pitting edema      LABS:                          12.8   8.81  )-----------( 130      ( 19 Apr 2024 07:15 )             38.8     04-19    144  |  110<H>  |  32<H>  ----------------------------<  107<H>  3.8   |  22  |  1.00    Ca    9.7      19 Apr 2024 07:16  Phos  3.1     04-19  Mg     2.0     04-19    TPro  6.0  /  Alb  2.9<L>  /  TBili  0.4  /  DBili  x   /  AST  35  /  ALT  50<H>  /  AlkPhos  72  04-19    LIVER FUNCTIONS - ( 19 Apr 2024 07:16 )  Alb: 2.9 g/dL / Pro: 6.0 g/dL / ALK PHOS: 72 U/L / ALT: 50 U/L / AST: 35 U/L / GGT: x                   Urinalysis Basic - ( 19 Apr 2024 07:16 )    Color: x / Appearance: x / SG: x / pH: x  Gluc: 107 mg/dL / Ketone: x  / Bili: x / Urobili: x   Blood: x / Protein: x / Nitrite: x   Leuk Esterase: x / RBC: x / WBC x   Sq Epi: x / Non Sq Epi: x / Bacteria: x

## 2024-04-19 NOTE — PROGRESS NOTE ADULT - PROBLEM SELECTOR PLAN 2
- MARY in inferolateral leads, w/ reduced LVEF. Now recovered on repeat TTE. Likely demand related iso septic shock  - Cardiology consulted, appreciate recommendations. D/C plavix, continue aspirin and statin

## 2024-04-20 PROCEDURE — 99232 SBSQ HOSP IP/OBS MODERATE 35: CPT | Mod: GC

## 2024-04-20 RX ORDER — FAMOTIDINE 10 MG/ML
20 INJECTION INTRAVENOUS
Refills: 0 | Status: DISCONTINUED | OUTPATIENT
Start: 2024-04-20 | End: 2024-04-22

## 2024-04-20 RX ADMIN — HEPARIN SODIUM 5000 UNIT(S): 5000 INJECTION INTRAVENOUS; SUBCUTANEOUS at 13:12

## 2024-04-20 RX ADMIN — Medication 3 MILLILITER(S): at 17:43

## 2024-04-20 RX ADMIN — TAMSULOSIN HYDROCHLORIDE 0.4 MILLIGRAM(S): 0.4 CAPSULE ORAL at 21:08

## 2024-04-20 RX ADMIN — SODIUM CHLORIDE 4 MILLILITER(S): 9 INJECTION INTRAMUSCULAR; INTRAVENOUS; SUBCUTANEOUS at 05:34

## 2024-04-20 RX ADMIN — Medication 1 SPRAY(S): at 05:33

## 2024-04-20 RX ADMIN — Medication 3 MILLILITER(S): at 11:10

## 2024-04-20 RX ADMIN — AMLODIPINE BESYLATE 5 MILLIGRAM(S): 2.5 TABLET ORAL at 05:32

## 2024-04-20 RX ADMIN — HEPARIN SODIUM 5000 UNIT(S): 5000 INJECTION INTRAVENOUS; SUBCUTANEOUS at 05:33

## 2024-04-20 RX ADMIN — CHLORHEXIDINE GLUCONATE 1 APPLICATION(S): 213 SOLUTION TOPICAL at 05:41

## 2024-04-20 RX ADMIN — Medication 1 SPRAY(S): at 05:34

## 2024-04-20 RX ADMIN — Medication 3 MILLIGRAM(S): at 21:08

## 2024-04-20 RX ADMIN — Medication 81 MILLIGRAM(S): at 05:32

## 2024-04-20 RX ADMIN — Medication 600 MILLIGRAM(S): at 05:32

## 2024-04-20 RX ADMIN — CARVEDILOL PHOSPHATE 12.5 MILLIGRAM(S): 80 CAPSULE, EXTENDED RELEASE ORAL at 17:38

## 2024-04-20 RX ADMIN — Medication 600 MILLIGRAM(S): at 17:38

## 2024-04-20 RX ADMIN — Medication 3 MILLILITER(S): at 05:32

## 2024-04-20 RX ADMIN — VALSARTAN 80 MILLIGRAM(S): 80 TABLET ORAL at 05:32

## 2024-04-20 RX ADMIN — Medication 1 SPRAY(S): at 17:40

## 2024-04-20 RX ADMIN — SODIUM CHLORIDE 4 MILLILITER(S): 9 INJECTION INTRAMUSCULAR; INTRAVENOUS; SUBCUTANEOUS at 17:43

## 2024-04-20 RX ADMIN — HEPARIN SODIUM 5000 UNIT(S): 5000 INJECTION INTRAVENOUS; SUBCUTANEOUS at 21:08

## 2024-04-20 RX ADMIN — CARVEDILOL PHOSPHATE 12.5 MILLIGRAM(S): 80 CAPSULE, EXTENDED RELEASE ORAL at 05:32

## 2024-04-20 NOTE — PROGRESS NOTE ADULT - PROBLEM SELECTOR PLAN 4
- Presentation w/ diarrhea, GI PCR positive for EPEC  - Supportive care - Presentation w/ diarrhea, GI PCR positive for EPEC  - Supportive care. Now with loose stools iso laxatives. Scale  back laxatives

## 2024-04-20 NOTE — PROGRESS NOTE ADULT - PROBLEM SELECTOR PLAN 8
- CTAP w/ nodule in gastric body  - EGD eval for neoplasm, but patient deferring workup at this time. Would like to think more about this

## 2024-04-20 NOTE — PROGRESS NOTE ADULT - ATTENDING COMMENTS
89-yo M PMHx of HTN, BPPV, presenting for emesis and diarrhea, found to be in AHRF 2/2 LLL PNA s/p intubation 4/11-4/12, c/b MARY likely due to demand.     #Acute hypoxic respiratory failure   #gram negative PNA  #STEMI vs demand ischemia   #acute systolic heart failure with reduced EF   #EPEC colitis    - Pt s/p 5d course of antibiotics for PNA; currently on 1-2LNC - aggressive pulmonary toilet, wean O2 as tolerated  - also found with EPEC on GI PCR, not having any diarrhea currently, no abd pain, tolerating diet; resolved.   - noted with MARY/elevated troponin, with TTE showing depressed EF, cardiology following - suspect likely 2/2 demand. s/p DAPT and 48hrs of heparin gtt.   - repeat TTE (4/17) showing normal EF, no WMA - likely prior changes were 2/2 acute illness/sepsis. Will d/c plavix, c/w ASA, statin, c/w coreg, switch entresto to valsartan   - urinary retention s/p mckee - passed TOV  - thrombocytopenia likely 2/2 critical illness, now improving, c/t monitor   - HERBERT 2/2 sepsis, improving   - gastric nodule, adrenal nodule - pt and family aware, further w/u as outpt if within GOC  - d/c planning - RON pending bed

## 2024-04-20 NOTE — PROGRESS NOTE ADULT - PROBLEM SELECTOR PLAN 9
- Pt w/ adrenal nodule seen on CTAP   - Warrants dedicated Adrenal CT or MRI for further eval.   - Patient would potentially want workup for this. He would not need this until outpatient and discharged from rehab.

## 2024-04-20 NOTE — PROGRESS NOTE ADULT - PROBLEM SELECTOR PLAN 1
- LLL PNA, intubated 4/11-4/12 for AHRF.  - s/p CTX, Azithro 4/11-4/12, Zosyn 4/13-4/15 for 5-day course  - Weaning O2  - Monitor off abx, wean O2 as tolerated  - Incentive spirometer, pulmonary toilet, PT, OOB to chair  - Duonebs and HTS given secretions. Likely iso recent PNA and intubation - LLL PNA, intubated 4/11-4/12 for AHRF.  - s/p CTX, Azithro 4/11-4/12, Zosyn 4/13-4/15 for 5-day course  - Weaning O2  - Monitor off abx, wean O2 as tolerated  - Incentive spirometer, pulmonary toilet, PT, OOB to chair  - Duonebs and HTS given secretions. Likely iso recent PNA and intubation. Improving

## 2024-04-20 NOTE — PROGRESS NOTE ADULT - SUBJECTIVE AND OBJECTIVE BOX
PATIENT: MERNA CAZARES, MRN: [id number]    CHIEF COMPLAINT: Patient is a 89y old  Male who presents with a chief complaint of Hypoxia requiring intubation (19 Apr 2024 09:25)      INTERVAL HISTORY/OVERNIGHT EVENTS: No overnight events.       MEDICATIONS:  MEDICATIONS  (STANDING):  albuterol/ipratropium for Nebulization 3 milliLiter(s) Nebulizer every 6 hours  amLODIPine   Tablet 5 milliGRAM(s) Oral daily  aspirin  chewable 81 milliGRAM(s) Oral daily  atorvastatin 80 milliGRAM(s) Oral every 24 hours  bisacodyl Suppository 10 milliGRAM(s) Rectal daily  carvedilol 12.5 milliGRAM(s) Oral every 12 hours  chlorhexidine 4% Liquid 1 Application(s) Topical <User Schedule>  fluticasone propionate 50 MICROgram(s)/spray Nasal Spray 1 Spray(s) Both Nostrils two times a day  guaiFENesin  milliGRAM(s) Oral every 12 hours  heparin   Injectable 5000 Unit(s) SubCutaneous every 8 hours  melatonin 3 milliGRAM(s) Oral at bedtime  polyethylene glycol 3350 17 Gram(s) Oral daily  senna 1 Tablet(s) Oral daily  sodium chloride 0.65% Nasal 1 Spray(s) Both Nostrils two times a day  sodium chloride 3%  Inhalation 4 milliLiter(s) Inhalation every 12 hours  tamsulosin 0.4 milliGRAM(s) Oral at bedtime  valsartan 80 milliGRAM(s) Oral daily    MEDICATIONS  (PRN):      ALLERGIES: Allergies    iodine (Other)    Intolerances        OBJECTIVE:  ICU Vital Signs Last 24 Hrs  T(C): 36.7 (19 Apr 2024 23:06), Max: 36.8 (19 Apr 2024 17:13)  T(F): 98.1 (19 Apr 2024 23:06), Max: 98.3 (19 Apr 2024 17:13)  HR: 69 (20 Apr 2024 05:31) (59 - 73)  BP: 132/71 (20 Apr 2024 05:31) (121/67 - 146/74)  BP(mean): --  ABP: --  ABP(mean): --  RR: 18 (19 Apr 2024 23:06) (18 - 18)  SpO2: 94% (19 Apr 2024 23:06) (94% - 95%)    O2 Parameters below as of 19 Apr 2024 23:06  Patient On (Oxygen Delivery Method): nasal cannula  O2 Flow (L/min): 1          CAPILLARY BLOOD GLUCOSE        CAPILLARY BLOOD GLUCOSE      POCT Blood Glucose.: 137 mg/dL (18 Apr 2024 21:29)    I&O's Summary    19 Apr 2024 07:01  -  20 Apr 2024 07:00  --------------------------------------------------------  IN: 0 mL / OUT: 200 mL / NET: -200 mL      Daily     Daily     PHYSICAL EXAMINATION:  General: Comfortable, no acute distress, cooperative with exam.  HEENT: PERRLA  Respiratory: CTAB, normal respiratory effort, no coughing, wheezes, crackles, or rales.  CV: RRR, S1S2, no murmurs, rubs or gallops. No JVD. Distal pulses intact.  Abdominal: Soft, nontender, nondistended, no rebound or guarding, normal bowel sounds.  Neurology: AOx3, no focal neuro defects, LUNDBERG x 4.  Extremities: No pitting edema, + Peripheral pulses.      LABS:                          12.8   8.81  )-----------( 130      ( 19 Apr 2024 07:15 )             38.8     04-19    144  |  110<H>  |  32<H>  ----------------------------<  107<H>  3.8   |  22  |  1.00    Ca    9.7      19 Apr 2024 07:16  Phos  3.1     04-19  Mg     2.0     04-19    TPro  6.0  /  Alb  2.9<L>  /  TBili  0.4  /  DBili  x   /  AST  35  /  ALT  50<H>  /  AlkPhos  72  04-19    LIVER FUNCTIONS - ( 19 Apr 2024 07:16 )  Alb: 2.9 g/dL / Pro: 6.0 g/dL / ALK PHOS: 72 U/L / ALT: 50 U/L / AST: 35 U/L / GGT: x                   Urinalysis Basic - ( 19 Apr 2024 07:16 )    Color: x / Appearance: x / SG: x / pH: x  Gluc: 107 mg/dL / Ketone: x  / Bili: x / Urobili: x   Blood: x / Protein: x / Nitrite: x   Leuk Esterase: x / RBC: x / WBC x   Sq Epi: x / Non Sq Epi: x / Bacteria: x       PATIENT: MERNA CAZARES, MRN: [id number]    CHIEF COMPLAINT: Patient is a 89y old  Male who presents with a chief complaint of Hypoxia requiring intubation (19 Apr 2024 09:25)      INTERVAL HISTORY/OVERNIGHT EVENTS: No overnight events. Mucus production slightly improved, no SOB, walked well with PT. However, loose BM after getting laxatives. Does have some reflux.      MEDICATIONS:  MEDICATIONS  (STANDING):  albuterol/ipratropium for Nebulization 3 milliLiter(s) Nebulizer every 6 hours  amLODIPine   Tablet 5 milliGRAM(s) Oral daily  aspirin  chewable 81 milliGRAM(s) Oral daily  atorvastatin 80 milliGRAM(s) Oral every 24 hours  bisacodyl Suppository 10 milliGRAM(s) Rectal daily  carvedilol 12.5 milliGRAM(s) Oral every 12 hours  chlorhexidine 4% Liquid 1 Application(s) Topical <User Schedule>  fluticasone propionate 50 MICROgram(s)/spray Nasal Spray 1 Spray(s) Both Nostrils two times a day  guaiFENesin  milliGRAM(s) Oral every 12 hours  heparin   Injectable 5000 Unit(s) SubCutaneous every 8 hours  melatonin 3 milliGRAM(s) Oral at bedtime  polyethylene glycol 3350 17 Gram(s) Oral daily  senna 1 Tablet(s) Oral daily  sodium chloride 0.65% Nasal 1 Spray(s) Both Nostrils two times a day  sodium chloride 3%  Inhalation 4 milliLiter(s) Inhalation every 12 hours  tamsulosin 0.4 milliGRAM(s) Oral at bedtime  valsartan 80 milliGRAM(s) Oral daily    MEDICATIONS  (PRN):      ALLERGIES: Allergies    iodine (Other)    Intolerances        OBJECTIVE:  ICU Vital Signs Last 24 Hrs  T(C): 36.7 (19 Apr 2024 23:06), Max: 36.8 (19 Apr 2024 17:13)  T(F): 98.1 (19 Apr 2024 23:06), Max: 98.3 (19 Apr 2024 17:13)  HR: 69 (20 Apr 2024 05:31) (59 - 73)  BP: 132/71 (20 Apr 2024 05:31) (121/67 - 146/74)  BP(mean): --  ABP: --  ABP(mean): --  RR: 18 (19 Apr 2024 23:06) (18 - 18)  SpO2: 94% (19 Apr 2024 23:06) (94% - 95%)    O2 Parameters below as of 19 Apr 2024 23:06  Patient On (Oxygen Delivery Method): nasal cannula  O2 Flow (L/min): 1          CAPILLARY BLOOD GLUCOSE        CAPILLARY BLOOD GLUCOSE      POCT Blood Glucose.: 137 mg/dL (18 Apr 2024 21:29)    I&O's Summary    19 Apr 2024 07:01  -  20 Apr 2024 07:00  --------------------------------------------------------  IN: 0 mL / OUT: 200 mL / NET: -200 mL      Daily     Daily     PHYSICAL EXAMINATION:  General: Comfortable, no acute distress, cooperative with exam.  Respiratory: rhonchi heard throughout, similar to prior  CV: RRR, S1S2, no murmurs, rubs or gallops.  Abdominal: Soft, nontender, nondistended, no rebound or guarding  Neurology: no focal neuro defects, LUNDBERG x 4.  Extremities: No pitting edema      LABS:                          12.8   8.81  )-----------( 130      ( 19 Apr 2024 07:15 )             38.8     04-19    144  |  110<H>  |  32<H>  ----------------------------<  107<H>  3.8   |  22  |  1.00    Ca    9.7      19 Apr 2024 07:16  Phos  3.1     04-19  Mg     2.0     04-19    TPro  6.0  /  Alb  2.9<L>  /  TBili  0.4  /  DBili  x   /  AST  35  /  ALT  50<H>  /  AlkPhos  72  04-19    LIVER FUNCTIONS - ( 19 Apr 2024 07:16 )  Alb: 2.9 g/dL / Pro: 6.0 g/dL / ALK PHOS: 72 U/L / ALT: 50 U/L / AST: 35 U/L / GGT: x                   Urinalysis Basic - ( 19 Apr 2024 07:16 )    Color: x / Appearance: x / SG: x / pH: x  Gluc: 107 mg/dL / Ketone: x  / Bili: x / Urobili: x   Blood: x / Protein: x / Nitrite: x   Leuk Esterase: x / RBC: x / WBC x   Sq Epi: x / Non Sq Epi: x / Bacteria: x

## 2024-04-20 NOTE — PROGRESS NOTE ADULT - PROBLEM SELECTOR PLAN 11
DVT PPx: Heparin SubQ  Diet: DASH  Bowel Regimen: PRN  Code: Full  Dispo: Per PT, RON  Pharmacy:  PCP:   Communication: DVT PPx: Heparin SubQ  Diet: DASH, maalox for reflux  Bowel Regimen: PRN  Code: Full  Dispo: Per PT, RON  Pharmacy:  PCP:   Communication:

## 2024-04-21 LAB
ALBUMIN SERPL ELPH-MCNC: 3.1 G/DL — LOW (ref 3.3–5)
ALP SERPL-CCNC: 79 U/L — SIGNIFICANT CHANGE UP (ref 40–120)
ALT FLD-CCNC: 44 U/L — SIGNIFICANT CHANGE UP (ref 10–45)
ANION GAP SERPL CALC-SCNC: 10 MMOL/L — SIGNIFICANT CHANGE UP (ref 5–17)
AST SERPL-CCNC: 25 U/L — SIGNIFICANT CHANGE UP (ref 10–40)
BILIRUB SERPL-MCNC: 0.2 MG/DL — SIGNIFICANT CHANGE UP (ref 0.2–1.2)
BUN SERPL-MCNC: 34 MG/DL — HIGH (ref 7–23)
CALCIUM SERPL-MCNC: 9.5 MG/DL — SIGNIFICANT CHANGE UP (ref 8.4–10.5)
CHLORIDE SERPL-SCNC: 109 MMOL/L — HIGH (ref 96–108)
CO2 SERPL-SCNC: 25 MMOL/L — SIGNIFICANT CHANGE UP (ref 22–31)
CREAT SERPL-MCNC: 1.01 MG/DL — SIGNIFICANT CHANGE UP (ref 0.5–1.3)
EGFR: 70 ML/MIN/1.73M2 — SIGNIFICANT CHANGE UP
GLUCOSE SERPL-MCNC: 112 MG/DL — HIGH (ref 70–99)
HCT VFR BLD CALC: 37.1 % — LOW (ref 39–50)
HGB BLD-MCNC: 12.1 G/DL — LOW (ref 13–17)
MAGNESIUM SERPL-MCNC: 2 MG/DL — SIGNIFICANT CHANGE UP (ref 1.6–2.6)
MCHC RBC-ENTMCNC: 30.3 PG — SIGNIFICANT CHANGE UP (ref 27–34)
MCHC RBC-ENTMCNC: 32.6 GM/DL — SIGNIFICANT CHANGE UP (ref 32–36)
MCV RBC AUTO: 93 FL — SIGNIFICANT CHANGE UP (ref 80–100)
NRBC # BLD: 0 /100 WBCS — SIGNIFICANT CHANGE UP (ref 0–0)
PHOSPHATE SERPL-MCNC: 3.1 MG/DL — SIGNIFICANT CHANGE UP (ref 2.5–4.5)
PLATELET # BLD AUTO: 166 K/UL — SIGNIFICANT CHANGE UP (ref 150–400)
POTASSIUM SERPL-MCNC: 3.8 MMOL/L — SIGNIFICANT CHANGE UP (ref 3.5–5.3)
POTASSIUM SERPL-SCNC: 3.8 MMOL/L — SIGNIFICANT CHANGE UP (ref 3.5–5.3)
PROT SERPL-MCNC: 5.9 G/DL — LOW (ref 6–8.3)
RBC # BLD: 3.99 M/UL — LOW (ref 4.2–5.8)
RBC # FLD: 14 % — SIGNIFICANT CHANGE UP (ref 10.3–14.5)
SARS-COV-2 RNA SPEC QL NAA+PROBE: SIGNIFICANT CHANGE UP
SODIUM SERPL-SCNC: 144 MMOL/L — SIGNIFICANT CHANGE UP (ref 135–145)
WBC # BLD: 7.83 K/UL — SIGNIFICANT CHANGE UP (ref 3.8–10.5)
WBC # FLD AUTO: 7.83 K/UL — SIGNIFICANT CHANGE UP (ref 3.8–10.5)

## 2024-04-21 PROCEDURE — 99232 SBSQ HOSP IP/OBS MODERATE 35: CPT | Mod: FS

## 2024-04-21 RX ADMIN — Medication 1 SPRAY(S): at 05:05

## 2024-04-21 RX ADMIN — VALSARTAN 80 MILLIGRAM(S): 80 TABLET ORAL at 05:05

## 2024-04-21 RX ADMIN — Medication 1 SPRAY(S): at 17:26

## 2024-04-21 RX ADMIN — ATORVASTATIN CALCIUM 80 MILLIGRAM(S): 80 TABLET, FILM COATED ORAL at 22:31

## 2024-04-21 RX ADMIN — AMLODIPINE BESYLATE 5 MILLIGRAM(S): 2.5 TABLET ORAL at 05:04

## 2024-04-21 RX ADMIN — Medication 1 SPRAY(S): at 17:25

## 2024-04-21 RX ADMIN — Medication 3 MILLILITER(S): at 05:03

## 2024-04-21 RX ADMIN — CARVEDILOL PHOSPHATE 12.5 MILLIGRAM(S): 80 CAPSULE, EXTENDED RELEASE ORAL at 05:04

## 2024-04-21 RX ADMIN — SODIUM CHLORIDE 4 MILLILITER(S): 9 INJECTION INTRAMUSCULAR; INTRAVENOUS; SUBCUTANEOUS at 05:03

## 2024-04-21 RX ADMIN — Medication 3 MILLILITER(S): at 00:14

## 2024-04-21 RX ADMIN — CARVEDILOL PHOSPHATE 12.5 MILLIGRAM(S): 80 CAPSULE, EXTENDED RELEASE ORAL at 17:24

## 2024-04-21 RX ADMIN — CHLORHEXIDINE GLUCONATE 1 APPLICATION(S): 213 SOLUTION TOPICAL at 05:06

## 2024-04-21 RX ADMIN — HEPARIN SODIUM 5000 UNIT(S): 5000 INJECTION INTRAVENOUS; SUBCUTANEOUS at 05:04

## 2024-04-21 RX ADMIN — Medication 3 MILLILITER(S): at 11:13

## 2024-04-21 RX ADMIN — Medication 3 MILLILITER(S): at 17:25

## 2024-04-21 RX ADMIN — ATORVASTATIN CALCIUM 80 MILLIGRAM(S): 80 TABLET, FILM COATED ORAL at 00:14

## 2024-04-21 RX ADMIN — Medication 81 MILLIGRAM(S): at 05:03

## 2024-04-21 RX ADMIN — Medication 600 MILLIGRAM(S): at 17:24

## 2024-04-21 RX ADMIN — TAMSULOSIN HYDROCHLORIDE 0.4 MILLIGRAM(S): 0.4 CAPSULE ORAL at 21:36

## 2024-04-21 RX ADMIN — HEPARIN SODIUM 5000 UNIT(S): 5000 INJECTION INTRAVENOUS; SUBCUTANEOUS at 13:27

## 2024-04-21 RX ADMIN — Medication 3 MILLIGRAM(S): at 21:34

## 2024-04-21 RX ADMIN — SODIUM CHLORIDE 4 MILLILITER(S): 9 INJECTION INTRAMUSCULAR; INTRAVENOUS; SUBCUTANEOUS at 17:25

## 2024-04-21 RX ADMIN — HEPARIN SODIUM 5000 UNIT(S): 5000 INJECTION INTRAVENOUS; SUBCUTANEOUS at 21:36

## 2024-04-21 RX ADMIN — Medication 600 MILLIGRAM(S): at 05:04

## 2024-04-21 NOTE — PROGRESS NOTE ADULT - ATTENDING COMMENTS
89-yo M PMHx of HTN, BPPV, presenting for emesis and diarrhea, found to be in AHRF 2/2 LLL PNA s/p intubation 4/11-4/12, c/b MARY likely due to demand.     #Acute hypoxic respiratory failure   #gram negative PNA  #STEMI vs demand ischemia   #acute systolic heart failure with reduced EF   #EPEC colitis    - Pt s/p 5d course of antibiotics for PNA; currently on 1-2LNC - aggressive pulmonary toilet, wean O2 as tolerated  - also found with EPEC on GI PCR, not having any diarrhea currently, no abd pain, tolerating diet; resolved.   - noted with MARY/elevated troponin, with TTE showing depressed EF, cardiology following - suspect likely 2/2 demand. s/p DAPT and 48hrs of heparin gtt.   - repeat TTE (4/17) showing normal EF, no WMA - likely prior changes were 2/2 acute illness/sepsis. Will d/c plavix, c/w ASA, statin, c/w coreg, switch entresto to valsartan   - urinary retention s/p mckee - passed TOV  - thrombocytopenia likely 2/2 critical illness, now improving, c/t monitor   - HERBERT 2/2 sepsis, improving   - gastric nodule, adrenal nodule - pt and family aware, further w/u as outpt if within GOC  - d/c planning - RON pending bed .  -spoke to SLP at bedside, they were concerned of aspiration, f/u MBS

## 2024-04-21 NOTE — PROGRESS NOTE ADULT - PROBLEM SELECTOR PLAN 4
- Presentation w/ diarrhea, GI PCR positive for EPEC  - Supportive care. Now with loose stools iso laxatives. Scale  back laxatives

## 2024-04-21 NOTE — PROGRESS NOTE ADULT - SUBJECTIVE AND OBJECTIVE BOX
An Matos | PGY-3  Internal Medicine    OVERNIGHT EVENTS: no overnight events      SUBJECTIVE: Patient was examined at bedside this morning. Appeared comfortable. Overnight vitals and monitoring results were reviewed. Reporting no complaints. Denied chest pain, SOB, abdominal pain, vomiting, diarrhea, constipation.       MEDICATIONS  (STANDING):  albuterol/ipratropium for Nebulization 3 milliLiter(s) Nebulizer every 6 hours  amLODIPine   Tablet 5 milliGRAM(s) Oral daily  aspirin  chewable 81 milliGRAM(s) Oral daily  atorvastatin 80 milliGRAM(s) Oral every 24 hours  carvedilol 12.5 milliGRAM(s) Oral every 12 hours  chlorhexidine 4% Liquid 1 Application(s) Topical <User Schedule>  fluticasone propionate 50 MICROgram(s)/spray Nasal Spray 1 Spray(s) Both Nostrils two times a day  guaiFENesin  milliGRAM(s) Oral every 12 hours  heparin   Injectable 5000 Unit(s) SubCutaneous every 8 hours  melatonin 3 milliGRAM(s) Oral at bedtime  polyethylene glycol 3350 17 Gram(s) Oral daily  sodium chloride 0.65% Nasal 1 Spray(s) Both Nostrils two times a day  sodium chloride 3%  Inhalation 4 milliLiter(s) Inhalation every 12 hours  tamsulosin 0.4 milliGRAM(s) Oral at bedtime  valsartan 80 milliGRAM(s) Oral daily    MEDICATIONS  (PRN):  aluminum hydroxide/magnesium hydroxide/simethicone Suspension 30 milliLiter(s) Oral every 6 hours PRN Dyspepsia  famotidine    Tablet 20 milliGRAM(s) Oral two times a day PRN dyspepsia        T(F): 97.7 (04-21-24 @ 09:00), Max: 98.4 (04-20-24 @ 17:34)  HR: 81 (04-21-24 @ 09:00) (54 - 81)  BP: 120/69 (04-21-24 @ 09:00) (120/69 - 138/74)  BP(mean): --  RR: 18 (04-21-24 @ 09:00) (18 - 18)  SpO2: 96% (04-21-24 @ 09:00) (94% - 96%)    PHYSICAL EXAM:     General: Comfortable, no acute distress, cooperative with exam.  Respiratory: rhonchi heard throughout, similar to prior  CV: RRR, S1S2, no murmurs, rubs or gallops.  Abdominal: Soft, nontender, nondistended, no rebound or guarding  Neurology: no focal neuro defects, LUNDBERG x 4.  Extremities: No pitting edema    TELEMETRY:    LABS:                        12.1   7.83  )-----------( 166      ( 21 Apr 2024 06:55 )             37.1     04-21    144  |  109<H>  |  34<H>  ----------------------------<  112<H>  3.8   |  25  |  1.01    Ca    9.5      21 Apr 2024 06:54  Phos  3.1     04-21  Mg     2.0     04-21    TPro  5.9<L>  /  Alb  3.1<L>  /  TBili  0.2  /  DBili  x   /  AST  25  /  ALT  44  /  AlkPhos  79  04-21            Creatinine Trend: 1.01<--, 1.00<--, 0.94<--, 1.04<--, 1.20<--, 1.36<--  I&O's Summary    20 Apr 2024 07:01  -  21 Apr 2024 07:00  --------------------------------------------------------  IN: 240 mL / OUT: 300 mL / NET: -60 mL      BNP    RADIOLOGY & ADDITIONAL STUDIES:

## 2024-04-21 NOTE — CHART NOTE - NSCHARTNOTEFT_GEN_A_CORE
89 yo admitted for diarrhea/vomiting, pneumonia and AHRF requiring intubation x1day; failed RN screen on large volume cup sips post extubation; c/o hoarse vocal quality, now resolved; has since been tolerating medications whole with sips of water; SpO2 96% on RA.     Seen for initial bedside swallow evaluation 4/15 with overtly functional oropharyngeal swallow. O2 sats at that time were 96% on RA. No dysphonia. No cough. No shortness of breath during eating/speaking. Recommended for regular/thin.     Pt seen today for Speech/Swallow follow up. AA+Ox4. Continues on regular/thin diet consistency. Satting 97% on 2L O2 NC. Per KEREN Herndon, unable to wean. Increased shortness of breath noted when speaking and eating. Persistent congested cough at baseline; delayed/inconsistent with po intake. Given persistence of cough/congestion, consideration of MBS to r/o aspiration as contributing factor d/w MD Diego; MBS scheduled for 4/22 to objectively assess pharyngeal swallow. Safe swallow guidelines/aspiration precautions reviewed with patient. Pt verbalized understanding. Purposeful proactive rounding reinforced and 5 Ps addressed. Pt left in no distress. Family present at bedside.    RECOMMENDATIONS:  1) regular/thin  2) aspiration precautions  3) small single sips/bites  4) MBS, planned for 4/22  5) dc to rehab    d/w MD Johnathon House, patient, son    Re CRISTHIAN Jeffries, CCC-SLP  Teams or [phone]

## 2024-04-21 NOTE — CHART NOTE - NSCHARTNOTESELECT_GEN_ALL_CORE
From MICU/Transfer Note
MICU POCUS Note/Event Note
MICU POCUS note/Event Note
Cardiology/Event Note
GOC/Event Note
Speech/Swallow

## 2024-04-21 NOTE — PROGRESS NOTE ADULT - PROBLEM SELECTOR PLAN 1
- LLL PNA, intubated 4/11-4/12 for AHRF.  - s/p CTX, Azithro 4/11-4/12, Zosyn 4/13-4/15 for 5-day course  - Weaning O2  - Monitor off abx, wean O2 as tolerated  - Incentive spirometer, pulmonary toilet, PT, OOB to chair  - Duonebs and HTS given secretions. Likely iso recent PNA and intubation. Improving

## 2024-04-21 NOTE — PROGRESS NOTE ADULT - TIME BILLING
MARY  septic shock  medical mgt
chart and data review, clinical assessment, and coordination of care. This excludes any time spent on separate procedures or teaching.
- Reviewing, and interpreting labs and testing.  - Independently obtaining a review of systems and performing a physical exam  - Reviewing consultant documentation/recommendations in addition to discussing plan of care with consultants.  - Counselling and educating patient and family regarding interpretation of aforementioned items and plan of care.
- Ordering, reviewing, and interpreting labs, testing, and imaging  - Independently obtaining a review of systems and performing a physical exam  - Reviewing consultant documentation/recommendations in addition to discussing plan of care with consultants  - Counselling and educating patient and family regarding interpretation of aforementioned items and plan of care

## 2024-04-21 NOTE — PROGRESS NOTE ADULT - PROBLEM SELECTOR PLAN 11
DVT PPx: Heparin SubQ  Diet: DASH, maalox for reflux  Bowel Regimen: PRN  Code: Full  Dispo: Per PT, RNO  Pharmacy:  PCP:   Communication:

## 2024-04-22 ENCOUNTER — TRANSCRIPTION ENCOUNTER (OUTPATIENT)
Age: 89
End: 2024-04-22

## 2024-04-22 PROBLEM — I10 ESSENTIAL (PRIMARY) HYPERTENSION: Chronic | Status: ACTIVE | Noted: 2024-04-11

## 2024-04-22 LAB — GLUCOSE BLDC GLUCOMTR-MCNC: 144 MG/DL — HIGH (ref 70–99)

## 2024-04-22 PROCEDURE — 74176 CT ABD & PELVIS W/O CONTRAST: CPT | Mod: MC

## 2024-04-22 PROCEDURE — 85049 AUTOMATED PLATELET COUNT: CPT

## 2024-04-22 PROCEDURE — 94640 AIRWAY INHALATION TREATMENT: CPT

## 2024-04-22 PROCEDURE — 0225U NFCT DS DNA&RNA 21 SARSCOV2: CPT

## 2024-04-22 PROCEDURE — 87040 BLOOD CULTURE FOR BACTERIA: CPT

## 2024-04-22 PROCEDURE — 87070 CULTURE OTHR SPECIMN AEROBIC: CPT

## 2024-04-22 PROCEDURE — 87507 IADNA-DNA/RNA PROBE TQ 12-25: CPT

## 2024-04-22 PROCEDURE — 74230 X-RAY XM SWLNG FUNCJ C+: CPT | Mod: 26

## 2024-04-22 PROCEDURE — 97116 GAIT TRAINING THERAPY: CPT

## 2024-04-22 PROCEDURE — 36415 COLL VENOUS BLD VENIPUNCTURE: CPT

## 2024-04-22 PROCEDURE — 96372 THER/PROPH/DIAG INJ SC/IM: CPT

## 2024-04-22 PROCEDURE — 83036 HEMOGLOBIN GLYCOSYLATED A1C: CPT

## 2024-04-22 PROCEDURE — 85730 THROMBOPLASTIN TIME PARTIAL: CPT

## 2024-04-22 PROCEDURE — 92610 EVALUATE SWALLOWING FUNCTION: CPT

## 2024-04-22 PROCEDURE — 85025 COMPLETE CBC W/AUTO DIFF WBC: CPT

## 2024-04-22 PROCEDURE — 92526 ORAL FUNCTION THERAPY: CPT

## 2024-04-22 PROCEDURE — 87899 AGENT NOS ASSAY W/OPTIC: CPT

## 2024-04-22 PROCEDURE — 87641 MR-STAPH DNA AMP PROBE: CPT

## 2024-04-22 PROCEDURE — 84295 ASSAY OF SERUM SODIUM: CPT

## 2024-04-22 PROCEDURE — 85014 HEMATOCRIT: CPT

## 2024-04-22 PROCEDURE — 71045 X-RAY EXAM CHEST 1 VIEW: CPT

## 2024-04-22 PROCEDURE — 85018 HEMOGLOBIN: CPT

## 2024-04-22 PROCEDURE — 96374 THER/PROPH/DIAG INJ IV PUSH: CPT

## 2024-04-22 PROCEDURE — 82553 CREATINE MB FRACTION: CPT

## 2024-04-22 PROCEDURE — 83735 ASSAY OF MAGNESIUM: CPT

## 2024-04-22 PROCEDURE — 82550 ASSAY OF CK (CPK): CPT

## 2024-04-22 PROCEDURE — 84484 ASSAY OF TROPONIN QUANT: CPT

## 2024-04-22 PROCEDURE — 94660 CPAP INITIATION&MGMT: CPT

## 2024-04-22 PROCEDURE — 82435 ASSAY OF BLOOD CHLORIDE: CPT

## 2024-04-22 PROCEDURE — 87637 SARSCOV2&INF A&B&RSV AMP PRB: CPT

## 2024-04-22 PROCEDURE — 93971 EXTREMITY STUDY: CPT | Mod: 26,LT

## 2024-04-22 PROCEDURE — 87640 STAPH A DNA AMP PROBE: CPT

## 2024-04-22 PROCEDURE — 74230 X-RAY XM SWLNG FUNCJ C+: CPT

## 2024-04-22 PROCEDURE — 81001 URINALYSIS AUTO W/SCOPE: CPT

## 2024-04-22 PROCEDURE — 87324 CLOSTRIDIUM AG IA: CPT

## 2024-04-22 PROCEDURE — 83690 ASSAY OF LIPASE: CPT

## 2024-04-22 PROCEDURE — 97162 PT EVAL MOD COMPLEX 30 MIN: CPT

## 2024-04-22 PROCEDURE — 96375 TX/PRO/DX INJ NEW DRUG ADDON: CPT

## 2024-04-22 PROCEDURE — 87449 NOS EACH ORGANISM AG IA: CPT

## 2024-04-22 PROCEDURE — 94002 VENT MGMT INPAT INIT DAY: CPT

## 2024-04-22 PROCEDURE — 83880 ASSAY OF NATRIURETIC PEPTIDE: CPT

## 2024-04-22 PROCEDURE — 80053 COMPREHEN METABOLIC PANEL: CPT

## 2024-04-22 PROCEDURE — 85610 PROTHROMBIN TIME: CPT

## 2024-04-22 PROCEDURE — 86022 PLATELET ANTIBODIES: CPT

## 2024-04-22 PROCEDURE — 87635 SARS-COV-2 COVID-19 AMP PRB: CPT

## 2024-04-22 PROCEDURE — C8929: CPT

## 2024-04-22 PROCEDURE — 85027 COMPLETE CBC AUTOMATED: CPT

## 2024-04-22 PROCEDURE — 99285 EMERGENCY DEPT VISIT HI MDM: CPT | Mod: 25

## 2024-04-22 PROCEDURE — 93971 EXTREMITY STUDY: CPT

## 2024-04-22 PROCEDURE — 93321 DOPPLER ECHO F-UP/LMTD STD: CPT

## 2024-04-22 PROCEDURE — 71250 CT THORAX DX C-: CPT | Mod: MC

## 2024-04-22 PROCEDURE — 83605 ASSAY OF LACTIC ACID: CPT

## 2024-04-22 PROCEDURE — 82330 ASSAY OF CALCIUM: CPT

## 2024-04-22 PROCEDURE — 80061 LIPID PANEL: CPT

## 2024-04-22 PROCEDURE — 82962 GLUCOSE BLOOD TEST: CPT

## 2024-04-22 PROCEDURE — 92611 MOTION FLUOROSCOPY/SWALLOW: CPT

## 2024-04-22 PROCEDURE — 97110 THERAPEUTIC EXERCISES: CPT

## 2024-04-22 PROCEDURE — 99239 HOSP IP/OBS DSCHRG MGMT >30: CPT

## 2024-04-22 PROCEDURE — 70450 CT HEAD/BRAIN W/O DYE: CPT | Mod: MC

## 2024-04-22 PROCEDURE — 82947 ASSAY GLUCOSE BLOOD QUANT: CPT

## 2024-04-22 PROCEDURE — 93005 ELECTROCARDIOGRAM TRACING: CPT

## 2024-04-22 PROCEDURE — 31500 INSERT EMERGENCY AIRWAY: CPT

## 2024-04-22 PROCEDURE — 87086 URINE CULTURE/COLONY COUNT: CPT

## 2024-04-22 PROCEDURE — C8924: CPT

## 2024-04-22 PROCEDURE — 84439 ASSAY OF FREE THYROXINE: CPT

## 2024-04-22 PROCEDURE — 84132 ASSAY OF SERUM POTASSIUM: CPT

## 2024-04-22 PROCEDURE — 94003 VENT MGMT INPAT SUBQ DAY: CPT

## 2024-04-22 PROCEDURE — 84443 ASSAY THYROID STIM HORMONE: CPT

## 2024-04-22 PROCEDURE — 84100 ASSAY OF PHOSPHORUS: CPT

## 2024-04-22 PROCEDURE — 82803 BLOOD GASES ANY COMBINATION: CPT

## 2024-04-22 PROCEDURE — 93308 TTE F-UP OR LMTD: CPT

## 2024-04-22 RX ORDER — APIXABAN 2.5 MG/1
2 TABLET, FILM COATED ORAL
Qty: 28 | Refills: 0
Start: 2024-04-22 | End: 2024-04-28

## 2024-04-22 RX ORDER — APIXABAN 2.5 MG/1
10 TABLET, FILM COATED ORAL EVERY 12 HOURS
Refills: 0 | Status: DISCONTINUED | OUTPATIENT
Start: 2024-04-22 | End: 2024-04-22

## 2024-04-22 RX ORDER — APIXABAN 2.5 MG/1
1 TABLET, FILM COATED ORAL
Qty: 180 | Refills: 0
Start: 2024-04-22 | End: 2024-07-20

## 2024-04-22 RX ADMIN — Medication 600 MILLIGRAM(S): at 05:47

## 2024-04-22 RX ADMIN — Medication 81 MILLIGRAM(S): at 05:47

## 2024-04-22 RX ADMIN — Medication 600 MILLIGRAM(S): at 17:19

## 2024-04-22 RX ADMIN — AMLODIPINE BESYLATE 5 MILLIGRAM(S): 2.5 TABLET ORAL at 05:47

## 2024-04-22 RX ADMIN — Medication 1 SPRAY(S): at 17:20

## 2024-04-22 RX ADMIN — SODIUM CHLORIDE 4 MILLILITER(S): 9 INJECTION INTRAMUSCULAR; INTRAVENOUS; SUBCUTANEOUS at 05:49

## 2024-04-22 RX ADMIN — CARVEDILOL PHOSPHATE 12.5 MILLIGRAM(S): 80 CAPSULE, EXTENDED RELEASE ORAL at 17:19

## 2024-04-22 RX ADMIN — SODIUM CHLORIDE 4 MILLILITER(S): 9 INJECTION INTRAMUSCULAR; INTRAVENOUS; SUBCUTANEOUS at 17:20

## 2024-04-22 RX ADMIN — HEPARIN SODIUM 5000 UNIT(S): 5000 INJECTION INTRAVENOUS; SUBCUTANEOUS at 05:47

## 2024-04-22 RX ADMIN — Medication 1 SPRAY(S): at 05:50

## 2024-04-22 RX ADMIN — CARVEDILOL PHOSPHATE 12.5 MILLIGRAM(S): 80 CAPSULE, EXTENDED RELEASE ORAL at 05:47

## 2024-04-22 RX ADMIN — Medication 3 MILLILITER(S): at 11:42

## 2024-04-22 RX ADMIN — VALSARTAN 80 MILLIGRAM(S): 80 TABLET ORAL at 05:49

## 2024-04-22 RX ADMIN — Medication 3 MILLILITER(S): at 17:20

## 2024-04-22 RX ADMIN — APIXABAN 10 MILLIGRAM(S): 2.5 TABLET, FILM COATED ORAL at 14:01

## 2024-04-22 RX ADMIN — Medication 3 MILLILITER(S): at 05:49

## 2024-04-22 NOTE — PROGRESS NOTE ADULT - REASON FOR ADMISSION
Hypoxia requiring intubation

## 2024-04-22 NOTE — PROGRESS NOTE ADULT - PROBLEM SELECTOR PROBLEM 2
ST elevation

## 2024-04-22 NOTE — SWALLOW VFSS/MBS ASSESSMENT ADULT - NS SWALLOW VFSS REC ASPIR MON
Monitor for s/s aspiration/laryngeal penetration. If noted:  D/C p.o. intake, provide non-oral nutrition/hydration/meds, and contact this service @ [phone]/change of breathing pattern/gurgly voice/fever/pneumonia/throat clearing/upper respiratory infection

## 2024-04-22 NOTE — PROGRESS NOTE ADULT - PROBLEM SELECTOR PROBLEM 9
Adrenal nodule

## 2024-04-22 NOTE — SWALLOW VFSS/MBS ASSESSMENT ADULT - DIAGNOSTIC IMPRESSIONS
Patient presents with normal oropharyngeal swallow function. Intact oral management, pharyngeal clearance and airway protection across all consistencies trialed. There is occasional congested cough without evidence of laryngeal penetration or aspiration.

## 2024-04-22 NOTE — PROGRESS NOTE ADULT - PROVIDER SPECIALTY LIST ADULT
Cardiology
MICU
Internal Medicine

## 2024-04-22 NOTE — DISCHARGE NOTE NURSING/CASE MANAGEMENT/SOCIAL WORK - NSDCPEFALRISK_GEN_ALL_CORE
For information on Fall & Injury Prevention, visit: [email or web address] OR  [email or web address] OR  https://www.cdc.gov/steadi/patient.html

## 2024-04-22 NOTE — DISCHARGE NOTE NURSING/CASE MANAGEMENT/SOCIAL WORK - NSDCFUADDAPPT_GEN_ALL_CORE_FT
APPTS ARE READY TO BE MADE: [x] YES    Best Family or Patient Contact (if needed):    Additional Information about above appointments (if needed):    1: PCP for f/u of nodules, HFU  2: Cardiology in the next 2 weeks  3:     Other comments or requests:

## 2024-04-22 NOTE — PROGRESS NOTE ADULT - SUBJECTIVE AND OBJECTIVE BOX
PATIENT: MERNA CAZARES, MRN: [id number]    CHIEF COMPLAINT: Patient is a 89y old  Male who presents with a chief complaint of Hypoxia requiring intubation (21 Apr 2024 10:05)      INTERVAL HISTORY/OVERNIGHT EVENTS: No overnight events.       MEDICATIONS:  MEDICATIONS  (STANDING):  albuterol/ipratropium for Nebulization 3 milliLiter(s) Nebulizer every 6 hours  amLODIPine   Tablet 5 milliGRAM(s) Oral daily  aspirin  chewable 81 milliGRAM(s) Oral daily  atorvastatin 80 milliGRAM(s) Oral every 24 hours  carvedilol 12.5 milliGRAM(s) Oral every 12 hours  chlorhexidine 4% Liquid 1 Application(s) Topical <User Schedule>  fluticasone propionate 50 MICROgram(s)/spray Nasal Spray 1 Spray(s) Both Nostrils two times a day  guaiFENesin  milliGRAM(s) Oral every 12 hours  heparin   Injectable 5000 Unit(s) SubCutaneous every 8 hours  melatonin 3 milliGRAM(s) Oral at bedtime  polyethylene glycol 3350 17 Gram(s) Oral daily  sodium chloride 0.65% Nasal 1 Spray(s) Both Nostrils two times a day  sodium chloride 3%  Inhalation 4 milliLiter(s) Inhalation every 12 hours  tamsulosin 0.4 milliGRAM(s) Oral at bedtime  valsartan 80 milliGRAM(s) Oral daily    MEDICATIONS  (PRN):  aluminum hydroxide/magnesium hydroxide/simethicone Suspension 30 milliLiter(s) Oral every 6 hours PRN Dyspepsia  famotidine    Tablet 20 milliGRAM(s) Oral two times a day PRN dyspepsia      ALLERGIES: Allergies    iodine (Other)    Intolerances        OBJECTIVE:  ICU Vital Signs Last 24 Hrs  T(C): 36.5 (21 Apr 2024 23:11), Max: 36.5 (21 Apr 2024 09:00)  T(F): 97.7 (21 Apr 2024 23:11), Max: 97.7 (21 Apr 2024 09:00)  HR: 54 (21 Apr 2024 23:11) (54 - 81)  BP: 123/69 (21 Apr 2024 23:11) (120/69 - 148/77)  BP(mean): --  ABP: --  ABP(mean): --  RR: 18 (21 Apr 2024 23:11) (18 - 18)  SpO2: 94% (21 Apr 2024 23:11) (94% - 96%)    O2 Parameters below as of 21 Apr 2024 23:11  Patient On (Oxygen Delivery Method): room air            CAPILLARY BLOOD GLUCOSE        CAPILLARY BLOOD GLUCOSE        I&O's Summary    21 Apr 2024 07:01  -  22 Apr 2024 07:00  --------------------------------------------------------  IN: 350 mL / OUT: 0 mL / NET: 350 mL      Daily     Daily     PHYSICAL EXAMINATION:  General: Comfortable, no acute distress, cooperative with exam.  Respiratory: Rhonchi heard throughout, no crackles  CV: RRR, S1S2, no murmurs, rubs or gallops.   Abdominal: Soft, nontender, nondistended, no rebound or guarding  Neurology: no focal neuro defects, LUNDBERG x 4.  Extremities: No pitting edema      LABS:                          12.1   7.83  )-----------( 166      ( 21 Apr 2024 06:55 )             37.1     04-21    144  |  109<H>  |  34<H>  ----------------------------<  112<H>  3.8   |  25  |  1.01    Ca    9.5      21 Apr 2024 06:54  Phos  3.1     04-21  Mg     2.0     04-21    TPro  5.9<L>  /  Alb  3.1<L>  /  TBili  0.2  /  DBili  x   /  AST  25  /  ALT  44  /  AlkPhos  79  04-21    LIVER FUNCTIONS - ( 21 Apr 2024 06:54 )  Alb: 3.1 g/dL / Pro: 5.9 g/dL / ALK PHOS: 79 U/L / ALT: 44 U/L / AST: 25 U/L / GGT: x                   Urinalysis Basic - ( 21 Apr 2024 06:54 )    Color: x / Appearance: x / SG: x / pH: x  Gluc: 112 mg/dL / Ketone: x  / Bili: x / Urobili: x   Blood: x / Protein: x / Nitrite: x   Leuk Esterase: x / RBC: x / WBC x   Sq Epi: x / Non Sq Epi: x / Bacteria: x       PATIENT: MERNA CAZARES, MRN: [id number]    CHIEF COMPLAINT: Patient is a 89y old  Male who presents with a chief complaint of Hypoxia requiring intubation (21 Apr 2024 10:05)      INTERVAL HISTORY/OVERNIGHT EVENTS: No overnight events. Overall breathing is improved, less phlegm production. Semiformed BMs.      MEDICATIONS:  MEDICATIONS  (STANDING):  albuterol/ipratropium for Nebulization 3 milliLiter(s) Nebulizer every 6 hours  amLODIPine   Tablet 5 milliGRAM(s) Oral daily  aspirin  chewable 81 milliGRAM(s) Oral daily  atorvastatin 80 milliGRAM(s) Oral every 24 hours  carvedilol 12.5 milliGRAM(s) Oral every 12 hours  chlorhexidine 4% Liquid 1 Application(s) Topical <User Schedule>  fluticasone propionate 50 MICROgram(s)/spray Nasal Spray 1 Spray(s) Both Nostrils two times a day  guaiFENesin  milliGRAM(s) Oral every 12 hours  heparin   Injectable 5000 Unit(s) SubCutaneous every 8 hours  melatonin 3 milliGRAM(s) Oral at bedtime  polyethylene glycol 3350 17 Gram(s) Oral daily  sodium chloride 0.65% Nasal 1 Spray(s) Both Nostrils two times a day  sodium chloride 3%  Inhalation 4 milliLiter(s) Inhalation every 12 hours  tamsulosin 0.4 milliGRAM(s) Oral at bedtime  valsartan 80 milliGRAM(s) Oral daily    MEDICATIONS  (PRN):  aluminum hydroxide/magnesium hydroxide/simethicone Suspension 30 milliLiter(s) Oral every 6 hours PRN Dyspepsia  famotidine    Tablet 20 milliGRAM(s) Oral two times a day PRN dyspepsia      ALLERGIES: Allergies    iodine (Other)    Intolerances        OBJECTIVE:  ICU Vital Signs Last 24 Hrs  T(C): 36.5 (21 Apr 2024 23:11), Max: 36.5 (21 Apr 2024 09:00)  T(F): 97.7 (21 Apr 2024 23:11), Max: 97.7 (21 Apr 2024 09:00)  HR: 54 (21 Apr 2024 23:11) (54 - 81)  BP: 123/69 (21 Apr 2024 23:11) (120/69 - 148/77)  BP(mean): --  ABP: --  ABP(mean): --  RR: 18 (21 Apr 2024 23:11) (18 - 18)  SpO2: 94% (21 Apr 2024 23:11) (94% - 96%)    O2 Parameters below as of 21 Apr 2024 23:11  Patient On (Oxygen Delivery Method): room air            CAPILLARY BLOOD GLUCOSE        CAPILLARY BLOOD GLUCOSE        I&O's Summary    21 Apr 2024 07:01  -  22 Apr 2024 07:00  --------------------------------------------------------  IN: 350 mL / OUT: 0 mL / NET: 350 mL      Daily     Daily     PHYSICAL EXAMINATION:  General: Comfortable, no acute distress, cooperative with exam.  Respiratory: Rhonchi heard throughout, no crackles, improved from 2 days prior  CV: RRR, S1S2, no murmurs, rubs or gallops.   Abdominal: Soft, nontender, nondistended, no rebound or guarding  Neurology: no focal neuro defects, LUNDBERG x 4.  Extremities: 1+ pitting edema of LLE, no TTP, no difference in overall circumference between BLE, no erythema of BLE      LABS:                          12.1   7.83  )-----------( 166      ( 21 Apr 2024 06:55 )             37.1     04-21    144  |  109<H>  |  34<H>  ----------------------------<  112<H>  3.8   |  25  |  1.01    Ca    9.5      21 Apr 2024 06:54  Phos  3.1     04-21  Mg     2.0     04-21    TPro  5.9<L>  /  Alb  3.1<L>  /  TBili  0.2  /  DBili  x   /  AST  25  /  ALT  44  /  AlkPhos  79  04-21    LIVER FUNCTIONS - ( 21 Apr 2024 06:54 )  Alb: 3.1 g/dL / Pro: 5.9 g/dL / ALK PHOS: 79 U/L / ALT: 44 U/L / AST: 25 U/L / GGT: x                   Urinalysis Basic - ( 21 Apr 2024 06:54 )    Color: x / Appearance: x / SG: x / pH: x  Gluc: 112 mg/dL / Ketone: x  / Bili: x / Urobili: x   Blood: x / Protein: x / Nitrite: x   Leuk Esterase: x / RBC: x / WBC x   Sq Epi: x / Non Sq Epi: x / Bacteria: x

## 2024-04-22 NOTE — SWALLOW VFSS/MBS ASSESSMENT ADULT - COMMENTS
Seen for initial bedside swallow evaluation 4/15 with overtly functional oropharyngeal swallow. O2 sats at that time were 96% on RA. No dysphonia. No cough. No shortness of breath during eating/speaking. Recommended for regular/thin. Seen for dysphagia follow up 4/21. Satting 97% on 2L O2 NC. Per RN Yanick, unable to wean. Increased shortness of breath noted when speaking and eating. Persistent congested cough at baseline; delayed/inconsistent with po intake. Given persistence of cough/congestion, consideration of MBS to r/o aspiration as contributing factor d/w MD Diego; MBS scheduled for 4/22 to objectively assess pharyngeal swallow.

## 2024-04-22 NOTE — PROGRESS NOTE ADULT - ATTENDING COMMENTS
89-yo M PMHx of HTN, BPPV, presenting for emesis and diarrhea, found to be in AHRF 2/2 LLL PNA s/p intubation 4/11-4/12, c/b MARY likely due to demand.     #Acute hypoxic respiratory failure   #gram negative PNA  #STEMI vs demand ischemia   #acute systolic heart failure with reduced EF   #EPEC colitis    - Pt s/p 5d course of antibiotics for PNA; currently on 1-2LNC - aggressive pulmonary toilet, wean O2 as tolerated  - also found with EPEC on GI PCR, not having any diarrhea currently, no abd pain, tolerating diet; resolved.   - noted with MARY/elevated troponin, with TTE showing depressed EF, cardiology following - suspect likely 2/2 demand. s/p DAPT and 48hrs of heparin gtt.   - repeat TTE (4/17) showing normal EF, no WMA - likely prior changes were 2/2 acute illness/sepsis. Will d/c plavix, c/w ASA, statin, c/w coreg, switch entresto to valsartan   - urinary retention s/p mckee - passed TOV  - thrombocytopenia likely 2/2 critical illness, now improving, c/t monitor   - HERBERT 2/2 sepsis, improving   - gastric nodule, adrenal nodule - pt and family aware, further w/u as outpt if within GOC  - S/S with c/f dysphagia, will f/u esophagram today   - mild LLE edema, check duplex to r/o DVT   - pt pending RON 89-yo M PMHx of HTN, BPPV, presenting for emesis and diarrhea, found to be in AHRF 2/2 LLL PNA s/p intubation 4/11-4/12, c/b MARY likely due to demand.     #Acute hypoxic respiratory failure   #gram negative PNA  #STEMI vs demand ischemia   #acute systolic heart failure with reduced EF   #EPEC colitis    - Pt s/p 5d course of antibiotics for PNA; currently on 1-2LNC - aggressive pulmonary toilet, wean O2 as tolerated  - also found with EPEC on GI PCR, not having any diarrhea currently, no abd pain, tolerating diet; resolved.   - noted with MARY/elevated troponin, with TTE showing depressed EF, cardiology following - suspect likely 2/2 demand. s/p DAPT and 48hrs of heparin gtt.   - repeat TTE (4/17) showing normal EF, no WMA - likely prior changes were 2/2 acute illness/sepsis. Will d/c plavix, c/w ASA, statin, c/w coreg, switch entresto to valsartan   - urinary retention s/p mckee - passed TOV  - thrombocytopenia likely 2/2 critical illness, now improving, c/t monitor   - HERBERT 2/2 sepsis, improving   - gastric nodule, adrenal nodule - pt and family aware, further w/u as outpt if within GOC  - S/S with c/f dysphagia, will f/u esophagram today   - mild LLE edema note don exam - duplex +L soleal DVT - likely provoked from immobility from prolonged admission, ICU stay. Start on Eliquis with run-in; can f/u with PMD for ongoing serial exams and need for AC.   - pt pending RON

## 2024-04-22 NOTE — PROGRESS NOTE ADULT - PROBLEM SELECTOR PLAN 11
DVT PPx: Heparin SubQ  Diet: DASH, maalox for reflux  Bowel Regimen: PRN  Code: Full  Dispo: Per PT, RON  Pharmacy:  PCP:   Communication: DVT PPx: Heparin SubQ  Diet: DASH, maalox/pepcid for reflux  Bowel Regimen: PRN  Code: Full  Dispo: Per PT, RON  Pharmacy:  PCP:   Communication: DVT PPx: Heparin SubQ, Duplex LLE pending  Diet: DASH, maalox/pepcid for reflux  Bowel Regimen: PRN  Code: Full  Dispo: Per PT, RON  Pharmacy:  PCP:   Communication:

## 2024-04-22 NOTE — DISCHARGE NOTE NURSING/CASE MANAGEMENT/SOCIAL WORK - PATIENT PORTAL LINK FT
You can access the FollowMyHealth Patient Portal offered by [facility] by registering at the following website: [email or web address]. By joining [facility]’s FollowMyHealth portal, you will also be able to view your health information using other applications (apps) compatible with our system.

## 2024-04-22 NOTE — PROGRESS NOTE ADULT - PROBLEM SELECTOR PROBLEM 5
HERBERT (acute kidney injury)

## 2024-04-22 NOTE — SWALLOW VFSS/MBS ASSESSMENT ADULT - SLP PERTINENT HISTORY OF CURRENT PROBLEM
89 yo admitted for diarrhea/vomiting, pneumonia and AHRF requiring intubation x1day; failed RN screen on large volume cup sips post extubation; c/o hoarse vocal quality, now resolved; has since been tolerating medications whole with sips of water; SpO2 96% on RA.

## 2024-04-23 VITALS
OXYGEN SATURATION: 96 % | SYSTOLIC BLOOD PRESSURE: 117 MMHG | HEART RATE: 86 BPM | RESPIRATION RATE: 18 BRPM | TEMPERATURE: 99 F | DIASTOLIC BLOOD PRESSURE: 77 MMHG

## 2024-04-26 NOTE — PHYSICAL THERAPY INITIAL EVALUATION ADULT - PLANNED THERAPY INTERVENTIONS, PT EVAL
HX OF SVT/ ABLATION balance training/bed mobility training/gait training/strengthening/transfer training

## 2024-05-17 RX ORDER — ASPIRIN 81 MG
81 TABLET, DELAYED RELEASE (ENTERIC COATED) ORAL
Refills: 0 | Status: DISCONTINUED | COMMUNITY
End: 2024-05-17

## 2024-06-07 ENCOUNTER — APPOINTMENT (OUTPATIENT)
Dept: NEPHROLOGY | Facility: CLINIC | Age: 89
End: 2024-06-07
Payer: MEDICARE

## 2024-06-07 VITALS
HEART RATE: 56 BPM | SYSTOLIC BLOOD PRESSURE: 132 MMHG | DIASTOLIC BLOOD PRESSURE: 64 MMHG | RESPIRATION RATE: 16 BRPM | HEIGHT: 68 IN | OXYGEN SATURATION: 97 %

## 2024-06-07 DIAGNOSIS — R00.1 BRADYCARDIA, UNSPECIFIED: ICD-10-CM

## 2024-06-07 DIAGNOSIS — I10 ESSENTIAL (PRIMARY) HYPERTENSION: ICD-10-CM

## 2024-06-07 DIAGNOSIS — E27.8 OTHER SPECIFIED DISORDERS OF ADRENAL GLAND: ICD-10-CM

## 2024-06-07 DIAGNOSIS — K31.89 OTHER DISEASES OF STOMACH AND DUODENUM: ICD-10-CM

## 2024-06-07 DIAGNOSIS — Z86.718 PERSONAL HISTORY OF OTHER VENOUS THROMBOSIS AND EMBOLISM: ICD-10-CM

## 2024-06-07 DIAGNOSIS — R32 UNSPECIFIED URINARY INCONTINENCE: ICD-10-CM

## 2024-06-07 PROCEDURE — 99213 OFFICE O/P EST LOW 20 MIN: CPT

## 2024-06-07 RX ORDER — VALSARTAN 80 MG/1
80 TABLET, COATED ORAL DAILY
Qty: 90 | Refills: 3 | Status: DISCONTINUED | COMMUNITY
Start: 2024-05-17 | End: 2024-06-07

## 2024-06-07 RX ORDER — VALSARTAN 40 MG/1
40 TABLET, COATED ORAL DAILY
Qty: 90 | Refills: 3 | Status: ACTIVE | COMMUNITY
Start: 2024-06-07 | End: 1900-01-01

## 2024-06-07 NOTE — ASSESSMENT
[FreeTextEntry1] : The patient has recovered well from his recent bout of sepsis and respiratory failure. He experience occasional dizziness. The dose of Valsartan was decreased to 40 mg per day. He would benefit from a cardiological evaluation. Will refer to Dr. Eldon Gupta. 1. Hypertension: well controlled.  2. Urinary Incontinence. Continue w/ incontinence brief. 3. Bradycardia. No syncope. Dizziness. Referred to Cardiology. 4 Recent DVT. Continue Eliquis. Repeat venous Doppler in September.  Follow up in August at which time will address two incidental findings during his hospitalization. Adrenal nodule and gastric nodule.

## 2024-06-07 NOTE — PHYSICAL EXAM
[General Appearance - Alert] : alert [General Appearance - In No Acute Distress] : in no acute distress [Sclera] : the sclera and conjunctiva were normal [Jugular Venous Distention Increased] : there was no jugular-venous distention [Auscultation Breath Sounds / Voice Sounds] : lungs were clear to auscultation bilaterally [Heart Rate And Rhythm] : heart rate was normal and rhythm regular [Heart Sounds] : normal S1 and S2 [Systolic grade ___/6] : A grade [unfilled]/6 systolic murmur was heard. [Edema] : there was no peripheral edema [Abdomen Tenderness] : non-tender [No Spinal Tenderness] : no spinal tenderness [Abnormal Walk] : normal gait [] : no rash [No Focal Deficits] : no focal deficits [Oriented To Time, Place, And Person] : oriented to person, place, and time

## 2024-06-07 NOTE — REASON FOR VISIT
[Follow-Up] : a follow-up visit [FreeTextEntry1] : Follow up after prolonged hospitalization at [facility]. Accompanied by Caregiver.

## 2024-06-07 NOTE — HISTORY OF PRESENT ILLNESS
[FreeTextEntry1] : The patient was hospitalized to [facility] over 2 months ago w/ sepsi and aspiration pneumonia. Required mechanical ventilation for about 2 weeks. Complications related to the sepsis included transient elevation of serum creatinine, fluid overload and DVT. He recovered impressively well and underwent rehab at [facility]. He was discharge home about two weeks ago.  The patient states he feels well. At times he still feel dizzy when he stands up suddenly from the chair. Appetite is normal. Has urinary incontinence and wears incontinence pads. Normal bowel movements. His current medication list was reconciled.  Labs were done on April 30th and were reviewed. His renal function was normal and his Hematocrit was 37%. Upon discharge he was asked to follow up w/ me and w/ a cardiologist.

## 2024-06-11 RX ORDER — APIXABAN 5 MG/1
5 TABLET, FILM COATED ORAL
Qty: 180 | Refills: 2 | Status: ACTIVE | COMMUNITY
Start: 2024-06-07 | End: 1900-01-01

## 2024-06-11 RX ORDER — CARVEDILOL 12.5 MG/1
12.5 TABLET, FILM COATED ORAL TWICE DAILY
Qty: 120 | Refills: 3 | Status: ACTIVE | COMMUNITY
Start: 2024-05-17 | End: 1900-01-01

## 2024-06-11 RX ORDER — TAMSULOSIN HYDROCHLORIDE 0.4 MG/1
0.4 CAPSULE ORAL
Qty: 90 | Refills: 2 | Status: ACTIVE | COMMUNITY
Start: 2024-06-07 | End: 1900-01-01

## 2024-07-19 ENCOUNTER — LABORATORY RESULT (OUTPATIENT)
Age: 89
End: 2024-07-19

## 2024-07-19 ENCOUNTER — APPOINTMENT (OUTPATIENT)
Dept: CARDIOLOGY | Facility: CLINIC | Age: 89
End: 2024-07-19
Payer: MEDICARE

## 2024-07-19 ENCOUNTER — NON-APPOINTMENT (OUTPATIENT)
Age: 89
End: 2024-07-19

## 2024-07-19 ENCOUNTER — APPOINTMENT (OUTPATIENT)
Dept: CARDIOLOGY | Facility: CLINIC | Age: 89
End: 2024-07-19

## 2024-07-19 VITALS
HEIGHT: 68 IN | BODY MASS INDEX: 24.55 KG/M2 | RESPIRATION RATE: 16 BRPM | SYSTOLIC BLOOD PRESSURE: 127 MMHG | DIASTOLIC BLOOD PRESSURE: 80 MMHG | OXYGEN SATURATION: 97 % | TEMPERATURE: 97.7 F | WEIGHT: 162 LBS | HEART RATE: 54 BPM

## 2024-07-19 DIAGNOSIS — R00.1 BRADYCARDIA, UNSPECIFIED: ICD-10-CM

## 2024-07-19 DIAGNOSIS — I10 ESSENTIAL (PRIMARY) HYPERTENSION: ICD-10-CM

## 2024-07-19 DIAGNOSIS — I34.0 NONRHEUMATIC MITRAL (VALVE) INSUFFICIENCY: ICD-10-CM

## 2024-07-19 DIAGNOSIS — R42 DIZZINESS AND GIDDINESS: ICD-10-CM

## 2024-07-19 DIAGNOSIS — R01.1 CARDIAC MURMUR, UNSPECIFIED: ICD-10-CM

## 2024-07-19 PROCEDURE — 99214 OFFICE O/P EST MOD 30 MIN: CPT

## 2024-07-19 PROCEDURE — 93000 ELECTROCARDIOGRAM COMPLETE: CPT

## 2024-07-19 PROCEDURE — 93246 EXT ECG>7D<15D RECORDING: CPT | Mod: 25

## 2024-07-19 PROCEDURE — G2211 COMPLEX E/M VISIT ADD ON: CPT

## 2024-07-22 PROBLEM — R42 DIZZINESS: Status: ACTIVE | Noted: 2024-07-22

## 2024-07-26 ENCOUNTER — NON-APPOINTMENT (OUTPATIENT)
Age: 89
End: 2024-07-26

## 2024-07-29 ENCOUNTER — APPOINTMENT (OUTPATIENT)
Dept: UROLOGY | Facility: CLINIC | Age: 89
End: 2024-07-29
Payer: MEDICARE

## 2024-07-29 VITALS — HEART RATE: 54 BPM | SYSTOLIC BLOOD PRESSURE: 174 MMHG | DIASTOLIC BLOOD PRESSURE: 74 MMHG

## 2024-07-29 DIAGNOSIS — R32 UNSPECIFIED URINARY INCONTINENCE: ICD-10-CM

## 2024-07-29 DIAGNOSIS — N13.8 BENIGN PROSTATIC HYPERPLASIA WITH LOWER URINARY TRACT SYMPMS: ICD-10-CM

## 2024-07-29 DIAGNOSIS — N40.1 BENIGN PROSTATIC HYPERPLASIA WITH LOWER URINARY TRACT SYMPMS: ICD-10-CM

## 2024-07-29 PROCEDURE — 99213 OFFICE O/P EST LOW 20 MIN: CPT

## 2024-07-29 NOTE — HISTORY OF PRESENT ILLNESS
[FreeTextEntry1] : MERNA CAZARES returns to the office today. He is a 89 year-old man with a history of BPH with lower urinary tract symptoms. He was admitted to the hospital 2-3months ago for hypoxia requiring intubation. He had a catheter in the hospital and had a successful trial of void. He was started on Tamsulosin and was experiencing urinary frequency. He stopped the Tamsulosin a few days ago and denies any difficulty with urination. He has a history of urinary incontinence, wears about 3-4 diapers a day.   He did report dizziness since being discharged from the hospital. He does not notice any improvement in the dizziness since stopping the Tamsulosin.   Reports a history of urinary tract infections

## 2024-07-29 NOTE — ASSESSMENT
[FreeTextEntry1] : UA/UC  Stop Tamsulosin and patient did not find it beneficial and felt symptoms were worse on it PVR 106mL..he is not interested in any medications and is not bothered by the residual  Increased hydration  Will reach out to Dr. Gupta and Dr. Cervantes regarding dizziness and hypertension.

## 2024-07-30 LAB
APPEARANCE: CLEAR
BACTERIA UR CULT: NORMAL
BACTERIA: NEGATIVE /HPF
BILIRUBIN URINE: NEGATIVE
BLOOD URINE: NEGATIVE
CAST: 0 /LPF
COLOR: YELLOW
EPITHELIAL CELLS: 0 /HPF
GLUCOSE QUALITATIVE U: NEGATIVE MG/DL
KETONES URINE: NEGATIVE MG/DL
LEUKOCYTE ESTERASE URINE: NEGATIVE
MICROSCOPIC-UA: NORMAL
NITRITE URINE: NEGATIVE
PH URINE: 7.5
PROTEIN URINE: NORMAL MG/DL
RED BLOOD CELLS URINE: 0 /HPF
SPECIFIC GRAVITY URINE: 1.02
UROBILINOGEN URINE: 0.2 MG/DL
WHITE BLOOD CELLS URINE: 0 /HPF

## 2024-07-31 ENCOUNTER — APPOINTMENT (OUTPATIENT)
Dept: VASCULAR SURGERY | Facility: CLINIC | Age: 89
End: 2024-07-31
Payer: MEDICARE

## 2024-07-31 ENCOUNTER — NON-APPOINTMENT (OUTPATIENT)
Age: 89
End: 2024-07-31

## 2024-07-31 VITALS
DIASTOLIC BLOOD PRESSURE: 76 MMHG | BODY MASS INDEX: 25.01 KG/M2 | TEMPERATURE: 97.9 F | HEIGHT: 68 IN | WEIGHT: 165 LBS | HEART RATE: 51 BPM | SYSTOLIC BLOOD PRESSURE: 179 MMHG

## 2024-07-31 PROCEDURE — 93970 EXTREMITY STUDY: CPT

## 2024-07-31 PROCEDURE — 99203 OFFICE O/P NEW LOW 30 MIN: CPT

## 2024-07-31 NOTE — HISTORY OF PRESENT ILLNESS
[FreeTextEntry1] : 89 year old man who is here for an outpatient follow-up for a L leg DVT noted in a recent hospital visit. He was hospitalized 4 months ago for food poisoning and developed pneumonia and sepsis requiring 3 days of ICU level care. LLE swelling was noted during the hospital stay and duplex revealed a DVT in the LLE. He was discharged after 2 months and is currently on Eliquis 5 mg BID.

## 2024-07-31 NOTE — PHYSICAL EXAM
[1+] : left 1+ [Ankle Swelling (On Exam)] : present [Ankle Swelling Bilaterally] : bilaterally  [Ankle Swelling On The Right] : mild [Varicose Veins Of Lower Extremities] : not present [de-identified] : Alert, well-appearing, no distress [de-identified] : 1+ edema BL ankles. No significant swelling, erythema, induration nor wounds. [de-identified] : Sensation and motor intact in BLE

## 2024-07-31 NOTE — PHYSICAL EXAM
[1+] : left 1+ [Ankle Swelling (On Exam)] : present [Ankle Swelling Bilaterally] : bilaterally  [Ankle Swelling On The Right] : mild [Varicose Veins Of Lower Extremities] : not present [de-identified] : Alert, well-appearing, no distress [de-identified] : 1+ edema BL ankles. No significant swelling, erythema, induration nor wounds. [de-identified] : Sensation and motor intact in BLE

## 2024-07-31 NOTE — ASSESSMENT
[FreeTextEntry1] : 89 year old man with a LLE DVT discovered in recent hospital stay here for outpatient follow-up.  Currently on anticoagulation with Eliquis 5 mg BID for >3m.  Mild 1+ ankle edema noted bilaterally on exam, no significant calf swelling, pain, erythema, nor induration bilaterally.   Bilateral venous duplex showed no DVTs, no SVTs  Plan ok to d/c A/C since pt completed 3m course for provoked DVT pt was instructed to restart ASA 81mg that he was taking prior to A/C  no indication to repeat imaging unless pt has recurrence of symptoms (pain or swelling)  f/up prn

## 2024-08-27 ENCOUNTER — APPOINTMENT (OUTPATIENT)
Dept: NEPHROLOGY | Facility: CLINIC | Age: 89
End: 2024-08-27
Payer: MEDICARE

## 2024-08-27 VITALS
DIASTOLIC BLOOD PRESSURE: 72 MMHG | BODY MASS INDEX: 25.01 KG/M2 | RESPIRATION RATE: 16 BRPM | WEIGHT: 165 LBS | OXYGEN SATURATION: 97 % | SYSTOLIC BLOOD PRESSURE: 134 MMHG | HEART RATE: 50 BPM | HEIGHT: 68 IN

## 2024-08-27 DIAGNOSIS — R32 UNSPECIFIED URINARY INCONTINENCE: ICD-10-CM

## 2024-08-27 DIAGNOSIS — D69.6 THROMBOCYTOPENIA, UNSPECIFIED: ICD-10-CM

## 2024-08-27 PROCEDURE — 99213 OFFICE O/P EST LOW 20 MIN: CPT

## 2024-08-27 NOTE — HISTORY OF PRESENT ILLNESS
[FreeTextEntry1] : Saw Dr. Gupta, cardiology. Had an event recording monitor. Results pending. The patient documents a resting heart rate of around 48 bpm. Feels fatigued and somnolent.  No syncope. Some orthostatic symptoms.  Seen by urologist for incontinence. No treatment expect for Depends.  Here for follow up.

## 2024-08-27 NOTE — ASSESSMENT
[FreeTextEntry1] : 1. Hypertension: controlled. 2 Bradycardia: May need to discontinue Carvedilol. Follow up w/ Dr. Gupta. 3. Incontinence. Continue wearing pads. 4. Thrombocytopenia. Stable.

## 2024-08-27 NOTE — PHYSICAL EXAM
[General Appearance - Alert] : alert [General Appearance - In No Acute Distress] : in no acute distress [Sclera] : the sclera and conjunctiva were normal [FreeTextEntry1] : Wears hearing aids [Jugular Venous Distention Increased] : there was no jugular-venous distention [Auscultation Breath Sounds / Voice Sounds] : lungs were clear to auscultation bilaterally [Heart Sounds] : normal S1 and S2 [Bradycardic ___] : the heart rate was bradycardic at [unfilled] bpm [Systolic grade ___/6] : A grade [unfilled]/6 systolic murmur was heard. [Edema] : there was no peripheral edema [Abdomen Tenderness] : non-tender [No Spinal Tenderness] : no spinal tenderness [Abnormal Walk] : normal gait [] : no rash [No Focal Deficits] : no focal deficits [Oriented To Time, Place, And Person] : oriented to person, place, and time

## 2024-08-27 NOTE — REASON FOR VISIT
[Follow-Up] : a follow-up visit [Formal Caregiver] : formal caregiver [FreeTextEntry1] : Follow up for Hypertension. Last visit on July 15, 2024.

## 2024-08-28 ENCOUNTER — APPOINTMENT (OUTPATIENT)
Dept: CARDIOLOGY | Facility: CLINIC | Age: 89
End: 2024-08-28
Payer: MEDICARE

## 2024-08-28 ENCOUNTER — NON-APPOINTMENT (OUTPATIENT)
Age: 89
End: 2024-08-28

## 2024-08-28 VITALS
BODY MASS INDEX: 25.01 KG/M2 | HEIGHT: 68 IN | OXYGEN SATURATION: 98 % | HEART RATE: 49 BPM | SYSTOLIC BLOOD PRESSURE: 138 MMHG | DIASTOLIC BLOOD PRESSURE: 74 MMHG | RESPIRATION RATE: 16 BRPM | WEIGHT: 165 LBS | TEMPERATURE: 97.9 F

## 2024-08-28 VITALS — HEART RATE: 50 BPM

## 2024-08-28 DIAGNOSIS — Z86.718 PERSONAL HISTORY OF OTHER VENOUS THROMBOSIS AND EMBOLISM: ICD-10-CM

## 2024-08-28 DIAGNOSIS — I34.0 NONRHEUMATIC MITRAL (VALVE) INSUFFICIENCY: ICD-10-CM

## 2024-08-28 DIAGNOSIS — R00.1 BRADYCARDIA, UNSPECIFIED: ICD-10-CM

## 2024-08-28 DIAGNOSIS — I10 ESSENTIAL (PRIMARY) HYPERTENSION: ICD-10-CM

## 2024-08-28 DIAGNOSIS — R42 DIZZINESS AND GIDDINESS: ICD-10-CM

## 2024-08-28 DIAGNOSIS — R01.1 CARDIAC MURMUR, UNSPECIFIED: ICD-10-CM

## 2024-08-28 PROCEDURE — 93000 ELECTROCARDIOGRAM COMPLETE: CPT

## 2024-08-28 PROCEDURE — G2211 COMPLEX E/M VISIT ADD ON: CPT

## 2024-08-28 PROCEDURE — 99214 OFFICE O/P EST MOD 30 MIN: CPT

## 2024-08-28 PROCEDURE — 93306 TTE W/DOPPLER COMPLETE: CPT

## 2024-08-28 NOTE — DISCUSSION/SUMMARY
[FreeTextEntry1] : This is a 89-year-old male with past medical history significant for hypertension, status post left deep venous vein thrombosis, status post tonsillectomy, status post prolonged hospitalization for 12 days after ingesting contaminated food with subsequent development of pneumonia requiring intubation followed by rehabilitation for approximately 25 days who comes in for cardiac follow-up evaluation. The patient has been feeling dizzy and lightheaded since discharge from rehabilitation. He denies chest pain, shortness of breath, or syncope. He has no history of rheumatic fever.  He does not drink excessive caffeine or alcohol. Cardiac risk factors include hypertension. Electrocardiogram done August 28, 2024 demonstrates sinus bradycardia rate of 50 bpm is otherwise remarkable for first-degree atrioventricular block, left axis deviation, and nonspecific ST wave changes. The patient had a 2-week event monitor done through the end of July which revealed no significant arrhythmia or heart block. The patient's fatigue and dizziness may be secondary to the effects of carvedilol I have asked him to lower the dosage to 1 tablet (12.5 mg after dinner).  If there is improvement in his symptoms I would discontinue the medication and consider increasing his valsartan therapy. He will follow-up with his nephrologist. Lipid panel done July 19, 2024 demonstrated cholesterol 203, HDL 51, triglycerides 130, LDL calculated 129, non-HDL cholesterol 152, LDL direct 126 mg/dL with hemoglobin A1c of 5.2. I discussed with him the role of lipid-lowering therapy for stroke prevention and cardiac prevention.  He prefers to improve his lipid panel naturally. His blood pressure is currently adequately controlled.  He will have an echo Doppler examination later today to evaluate his left ventricular function, chamber size, murmurs, and rule out hypertrophy.  The patient reports that his Eliquis 5 mg twice a day for his left deep venous thrombosis was discontinued by vascular surgery. He also thinks he was taken off tamsulosin but will check. I have recommended he make an appointment with Dr. Marsh of neurology to evaluate his current symptoms. I would recommend the patient have a 2-week event monitor to rule out significant arrhythmia or heart block. He is currently on valsartan 40 mg daily, Flomax 0.4 mg, carvedilol 12.5 mg twice a day, amlodipine 5 mg/day. He will have new blood work done for lipid panel, electrolytes, hemoglobin A1c and TSH. He is instructed follow-up with his primary care physician. Echo Doppler examination done February 12, 2020 demonstrated calcified mitral annulus with mild to moderate mitral valve regurgitation, aortic valve sclerosis with mild aortic valve regurgitation, satisfactory left ventricular function with estimated ejection fraction of 64%, mild tricuspid valve regurgitation. The patient is planning on attending a wedding in [place]. He is currently hemodynamically stable and asymptomatic from a cardiac standpoint. He understands he must maintain good hydration. He will follow-up with me after above-noted diagnostic tests are completed. I feel that he would benefit from discontinuing Flomax, but I await the recommendations of his urologist and nephrologist.  Thank you for allowing participate in the care of your patient.  Please not hesitate to call if you have any questions.

## 2024-08-28 NOTE — REASON FOR VISIT
[CV Risk Factors and Non-Cardiac Disease] : CV risk factors and non-cardiac disease [Hyperlipidemia] : hyperlipidemia [Hypertension] : hypertension [FreeTextEntry1] : This is a 89-year-old male with past medical history significant for hypertension, status post left deep venous vein thrombosis, status post tonsillectomy, status post prolonged hospitalization for 12 days after ingesting contaminated food with subsequent development of pneumonia requiring intubation followed by rehabilitation for approximately 25 days who comes in for cardiac follow up and echocardiogram   Patient reports feeling dizzy most days and feeling his balance is unstable when arising from seating position.  He is currently on valsartan 40 mg daily, Flomax 0.4 mg, carvedilol 12.5 mg twice a day, amlodipine 5 mg/day Patient went to see DVT specialist- discontinued Eliquis  Patient was hospitalized for 12 days april 2024  after ingesting contaminated food with subsequent development of pneumonia requiring intubation followed by rehabilitation- states they placed him on carvedilol, amlodipine and valsartan   Patient reports he conducted the 2 week bp monitor since the last visit  EKG done 8/28/24 revealed sinus bradycardia at a rate of 50bpm EKG done 7/19/24 revealed sinus bradycardia at a rate of 54bpm  Discussed changing Carvedilol dose to 12.5mg PO once a day to assess if there is improvement in patient's dizziness and bradycardia.  Will refer to neurologist due to complaint of dizziness   Echo Doppler examination done February 12, 2020 demonstrated calcified mitral annulus with mild to moderate mitral valve regurgitation, aortic valve sclerosis with mild aortic valve regurgitation, satisfactory left ventricular function with estimated ejection fraction of 64%, mild tricuspid valve regurgitation.

## 2024-09-12 ENCOUNTER — NON-APPOINTMENT (OUTPATIENT)
Age: 89
End: 2024-09-12

## 2024-09-25 ENCOUNTER — NON-APPOINTMENT (OUTPATIENT)
Age: 89
End: 2024-09-25

## 2024-10-17 ENCOUNTER — APPOINTMENT (OUTPATIENT)
Dept: CARDIOLOGY | Facility: CLINIC | Age: 89
End: 2024-10-17

## 2024-10-17 ENCOUNTER — APPOINTMENT (OUTPATIENT)
Dept: CARDIOLOGY | Facility: CLINIC | Age: 89
End: 2024-10-17
Payer: MEDICARE

## 2024-10-17 VITALS
BODY MASS INDEX: 25.16 KG/M2 | RESPIRATION RATE: 16 BRPM | DIASTOLIC BLOOD PRESSURE: 72 MMHG | SYSTOLIC BLOOD PRESSURE: 126 MMHG | WEIGHT: 166 LBS | HEART RATE: 62 BPM | HEIGHT: 68 IN | TEMPERATURE: 97.4 F | OXYGEN SATURATION: 97 %

## 2024-10-17 DIAGNOSIS — I10 ESSENTIAL (PRIMARY) HYPERTENSION: ICD-10-CM

## 2024-10-17 DIAGNOSIS — I34.0 NONRHEUMATIC MITRAL (VALVE) INSUFFICIENCY: ICD-10-CM

## 2024-10-17 DIAGNOSIS — Z86.718 PERSONAL HISTORY OF OTHER VENOUS THROMBOSIS AND EMBOLISM: ICD-10-CM

## 2024-10-17 DIAGNOSIS — R42 DIZZINESS AND GIDDINESS: ICD-10-CM

## 2024-10-17 DIAGNOSIS — I35.1 NONRHEUMATIC AORTIC (VALVE) INSUFFICIENCY: ICD-10-CM

## 2024-10-17 DIAGNOSIS — I07.1 RHEUMATIC TRICUSPID INSUFFICIENCY: ICD-10-CM

## 2024-10-17 DIAGNOSIS — Z13.31 ENCOUNTER FOR SCREENING FOR DEPRESSION: ICD-10-CM

## 2024-10-17 DIAGNOSIS — R03.0 ELEVATED BLOOD-PRESSURE READING, W/OUT DIAGNOSIS OF HYPERTENSION: ICD-10-CM

## 2024-10-17 PROCEDURE — G2211 COMPLEX E/M VISIT ADD ON: CPT

## 2024-10-17 PROCEDURE — 99214 OFFICE O/P EST MOD 30 MIN: CPT

## 2024-10-17 PROCEDURE — G0444 DEPRESSION SCREEN ANNUAL: CPT

## 2024-10-17 PROCEDURE — 93784 AMBL BP MNTR W/SOFTWARE: CPT

## 2024-10-21 PROBLEM — R03.0 WHITE COAT SYNDROME WITHOUT DIAGNOSIS OF HYPERTENSION: Status: ACTIVE | Noted: 2024-10-21

## 2024-10-29 ENCOUNTER — NON-APPOINTMENT (OUTPATIENT)
Age: 89
End: 2024-10-29

## 2024-10-31 ENCOUNTER — NON-APPOINTMENT (OUTPATIENT)
Age: 89
End: 2024-10-31

## 2024-10-31 DIAGNOSIS — Z63.4 DISAPPEARANCE AND DEATH OF FAMILY MEMBER: ICD-10-CM

## 2024-10-31 DIAGNOSIS — Z78.9 OTHER SPECIFIED HEALTH STATUS: ICD-10-CM

## 2024-10-31 DIAGNOSIS — K64.9 UNSPECIFIED HEMORRHOIDS: ICD-10-CM

## 2024-10-31 SDOH — SOCIAL STABILITY - SOCIAL INSECURITY: DISSAPEARANCE AND DEATH OF FAMILY MEMBER: Z63.4

## 2024-11-15 ENCOUNTER — APPOINTMENT (OUTPATIENT)
Dept: NEPHROLOGY | Facility: CLINIC | Age: 89
End: 2024-11-15
Payer: MEDICARE

## 2024-11-15 VITALS
BODY MASS INDEX: 25.16 KG/M2 | DIASTOLIC BLOOD PRESSURE: 74 MMHG | RESPIRATION RATE: 16 BRPM | HEIGHT: 68 IN | SYSTOLIC BLOOD PRESSURE: 128 MMHG | HEART RATE: 62 BPM | OXYGEN SATURATION: 98 % | WEIGHT: 166 LBS

## 2024-11-15 DIAGNOSIS — R80.9 PROTEINURIA, UNSPECIFIED: ICD-10-CM

## 2024-11-15 DIAGNOSIS — R00.1 BRADYCARDIA, UNSPECIFIED: ICD-10-CM

## 2024-11-15 DIAGNOSIS — I10 ESSENTIAL (PRIMARY) HYPERTENSION: ICD-10-CM

## 2024-11-15 DIAGNOSIS — D69.6 THROMBOCYTOPENIA, UNSPECIFIED: ICD-10-CM

## 2024-11-15 PROCEDURE — 99213 OFFICE O/P EST LOW 20 MIN: CPT

## 2024-11-16 LAB
ALBUMIN SERPL ELPH-MCNC: 4 G/DL
ANION GAP SERPL CALC-SCNC: 14 MMOL/L
BUN SERPL-MCNC: 32 MG/DL
CALCIUM SERPL-MCNC: 10.3 MG/DL
CHLORIDE SERPL-SCNC: 105 MMOL/L
CO2 SERPL-SCNC: 28 MMOL/L
CREAT SERPL-MCNC: 1.17 MG/DL
EGFR: 58 ML/MIN/1.73M2
GLUCOSE SERPL-MCNC: 87 MG/DL
HCT VFR BLD CALC: 43.4 %
HGB BLD-MCNC: 14.1 G/DL
MCHC RBC-ENTMCNC: 30.4 PG
MCHC RBC-ENTMCNC: 32.5 G/DL
MCV RBC AUTO: 93.5 FL
PHOSPHATE SERPL-MCNC: 2.5 MG/DL
PLATELET # BLD AUTO: 108 K/UL
POTASSIUM SERPL-SCNC: 5.5 MMOL/L
RBC # BLD: 4.64 M/UL
RBC # FLD: 14.3 %
SODIUM SERPL-SCNC: 147 MMOL/L
WBC # FLD AUTO: 5 K/UL

## 2025-02-07 ENCOUNTER — NON-APPOINTMENT (OUTPATIENT)
Age: 89
End: 2025-02-07

## 2025-02-07 ENCOUNTER — APPOINTMENT (OUTPATIENT)
Dept: NEPHROLOGY | Facility: CLINIC | Age: 89
End: 2025-02-07
Payer: MEDICARE

## 2025-02-07 VITALS
BODY MASS INDEX: 25.16 KG/M2 | HEART RATE: 60 BPM | WEIGHT: 166 LBS | RESPIRATION RATE: 16 BRPM | HEIGHT: 68 IN | SYSTOLIC BLOOD PRESSURE: 126 MMHG | DIASTOLIC BLOOD PRESSURE: 72 MMHG | OXYGEN SATURATION: 98 %

## 2025-02-07 DIAGNOSIS — I10 ESSENTIAL (PRIMARY) HYPERTENSION: ICD-10-CM

## 2025-02-07 DIAGNOSIS — D69.6 THROMBOCYTOPENIA, UNSPECIFIED: ICD-10-CM

## 2025-02-07 DIAGNOSIS — R42 DIZZINESS AND GIDDINESS: ICD-10-CM

## 2025-02-07 DIAGNOSIS — R00.1 BRADYCARDIA, UNSPECIFIED: ICD-10-CM

## 2025-02-07 PROCEDURE — 99213 OFFICE O/P EST LOW 20 MIN: CPT

## 2025-02-11 LAB
ALBUMIN SERPL ELPH-MCNC: 4.2 G/DL
ALP BLD-CCNC: 100 U/L
ALT SERPL-CCNC: 14 U/L
ANION GAP SERPL CALC-SCNC: 12 MMOL/L
AST SERPL-CCNC: 14 U/L
BILIRUB SERPL-MCNC: 0.3 MG/DL
BUN SERPL-MCNC: 32 MG/DL
CALCIUM SERPL-MCNC: 10.4 MG/DL
CHLORIDE SERPL-SCNC: 103 MMOL/L
CHOLEST SERPL-MCNC: 184 MG/DL
CO2 SERPL-SCNC: 29 MMOL/L
CREAT SERPL-MCNC: 1.19 MG/DL
EGFR: 57 ML/MIN/1.73M2
GLUCOSE SERPL-MCNC: 71 MG/DL
HCT VFR BLD CALC: 43.8 %
HDLC SERPL-MCNC: 53 MG/DL
HGB BLD-MCNC: 14.3 G/DL
LDLC SERPL CALC-MCNC: 102 MG/DL
MCHC RBC-ENTMCNC: 30.6 PG
MCHC RBC-ENTMCNC: 32.6 G/DL
MCV RBC AUTO: 93.6 FL
NONHDLC SERPL-MCNC: 130 MG/DL
PLATELET # BLD AUTO: 106 K/UL
POTASSIUM SERPL-SCNC: 5.1 MMOL/L
PROT SERPL-MCNC: 6.5 G/DL
RBC # BLD: 4.68 M/UL
RBC # FLD: 13.5 %
SODIUM SERPL-SCNC: 143 MMOL/L
TRIGL SERPL-MCNC: 160 MG/DL
WBC # FLD AUTO: 5.7 K/UL

## 2025-02-21 ENCOUNTER — RX RENEWAL (OUTPATIENT)
Age: 89
End: 2025-02-21

## 2025-03-05 ENCOUNTER — APPOINTMENT (OUTPATIENT)
Dept: CARDIOLOGY | Facility: CLINIC | Age: 89
End: 2025-03-05
Payer: MEDICARE

## 2025-03-05 ENCOUNTER — NON-APPOINTMENT (OUTPATIENT)
Age: 89
End: 2025-03-05

## 2025-03-05 VITALS
DIASTOLIC BLOOD PRESSURE: 77 MMHG | HEART RATE: 58 BPM | OXYGEN SATURATION: 95 % | HEIGHT: 68 IN | WEIGHT: 169 LBS | RESPIRATION RATE: 16 BRPM | SYSTOLIC BLOOD PRESSURE: 129 MMHG | BODY MASS INDEX: 25.61 KG/M2 | TEMPERATURE: 97.9 F

## 2025-03-05 DIAGNOSIS — I35.1 NONRHEUMATIC AORTIC (VALVE) INSUFFICIENCY: ICD-10-CM

## 2025-03-05 DIAGNOSIS — I10 ESSENTIAL (PRIMARY) HYPERTENSION: ICD-10-CM

## 2025-03-05 DIAGNOSIS — I34.0 NONRHEUMATIC MITRAL (VALVE) INSUFFICIENCY: ICD-10-CM

## 2025-03-05 DIAGNOSIS — E78.5 HYPERLIPIDEMIA, UNSPECIFIED: ICD-10-CM

## 2025-03-05 DIAGNOSIS — I44.0 ATRIOVENTRICULAR BLOCK, FIRST DEGREE: ICD-10-CM

## 2025-03-05 PROCEDURE — 93000 ELECTROCARDIOGRAM COMPLETE: CPT

## 2025-03-05 PROCEDURE — 99214 OFFICE O/P EST MOD 30 MIN: CPT

## 2025-03-05 PROCEDURE — G2211 COMPLEX E/M VISIT ADD ON: CPT

## 2025-03-05 RX ORDER — ROSUVASTATIN CALCIUM 5 MG/1
5 TABLET, FILM COATED ORAL DAILY
Qty: 90 | Refills: 1 | Status: ACTIVE | COMMUNITY
Start: 2025-03-05 | End: 1900-01-01

## 2025-03-10 ENCOUNTER — NON-APPOINTMENT (OUTPATIENT)
Age: 89
End: 2025-03-10

## 2025-04-30 ENCOUNTER — RX RENEWAL (OUTPATIENT)
Age: 89
End: 2025-04-30

## 2025-05-13 ENCOUNTER — APPOINTMENT (OUTPATIENT)
Dept: NEPHROLOGY | Facility: CLINIC | Age: 89
End: 2025-05-13
Payer: MEDICARE

## 2025-05-13 VITALS
OXYGEN SATURATION: 97 % | WEIGHT: 170 LBS | HEART RATE: 58 BPM | BODY MASS INDEX: 25.76 KG/M2 | HEIGHT: 68 IN | DIASTOLIC BLOOD PRESSURE: 64 MMHG | SYSTOLIC BLOOD PRESSURE: 128 MMHG | RESPIRATION RATE: 16 BRPM

## 2025-05-13 DIAGNOSIS — R42 DIZZINESS AND GIDDINESS: ICD-10-CM

## 2025-05-13 DIAGNOSIS — I44.0 ATRIOVENTRICULAR BLOCK, FIRST DEGREE: ICD-10-CM

## 2025-05-13 DIAGNOSIS — I10 ESSENTIAL (PRIMARY) HYPERTENSION: ICD-10-CM

## 2025-05-13 DIAGNOSIS — E78.5 HYPERLIPIDEMIA, UNSPECIFIED: ICD-10-CM

## 2025-05-13 PROCEDURE — 99213 OFFICE O/P EST LOW 20 MIN: CPT

## 2025-05-13 RX ORDER — LISINOPRIL 10 MG/1
10 TABLET ORAL TWICE DAILY
Qty: 180 | Refills: 3 | Status: ACTIVE | COMMUNITY
Start: 2025-05-13 | End: 1900-01-01

## 2025-05-15 ENCOUNTER — NON-APPOINTMENT (OUTPATIENT)
Age: 89
End: 2025-05-15

## 2025-05-15 LAB
ALBUMIN SERPL ELPH-MCNC: 4.1 G/DL
ALP BLD-CCNC: 96 U/L
ALT SERPL-CCNC: 16 U/L
ANION GAP SERPL CALC-SCNC: 14 MMOL/L
APO B SERPL-MCNC: 86 MG/DL
AST SERPL-CCNC: 19 U/L
BILIRUB SERPL-MCNC: 0.4 MG/DL
BUN SERPL-MCNC: 26 MG/DL
CALCIUM SERPL-MCNC: 10 MG/DL
CHLORIDE SERPL-SCNC: 104 MMOL/L
CHOLEST SERPL-MCNC: 188 MG/DL
CO2 SERPL-SCNC: 25 MMOL/L
CREAT SERPL-MCNC: 1.19 MG/DL
EGFRCR SERPLBLD CKD-EPI 2021: 57 ML/MIN/1.73M2
GLUCOSE SERPL-MCNC: 108 MG/DL
HCT VFR BLD CALC: 41.7 %
HDLC SERPL-MCNC: 53 MG/DL
HGB BLD-MCNC: 14 G/DL
LDLC SERPL-MCNC: 110 MG/DL
MCHC RBC-ENTMCNC: 31.2 PG
MCHC RBC-ENTMCNC: 33.6 G/DL
MCV RBC AUTO: 92.9 FL
NONHDLC SERPL-MCNC: 136 MG/DL
PLATELET # BLD AUTO: 101 K/UL
POTASSIUM SERPL-SCNC: 4.2 MMOL/L
PROT SERPL-MCNC: 6.4 G/DL
RBC # BLD: 4.49 M/UL
RBC # FLD: 13.5 %
SODIUM SERPL-SCNC: 143 MMOL/L
TRIGL SERPL-MCNC: 142 MG/DL
WBC # FLD AUTO: 4.72 K/UL

## 2025-05-28 ENCOUNTER — APPOINTMENT (OUTPATIENT)
Dept: CARDIOLOGY | Facility: CLINIC | Age: 89
End: 2025-05-28
Payer: MEDICARE

## 2025-05-28 ENCOUNTER — NON-APPOINTMENT (OUTPATIENT)
Age: 89
End: 2025-05-28

## 2025-05-28 VITALS
WEIGHT: 160 LBS | RESPIRATION RATE: 16 BRPM | DIASTOLIC BLOOD PRESSURE: 74 MMHG | HEART RATE: 58 BPM | HEIGHT: 68 IN | SYSTOLIC BLOOD PRESSURE: 128 MMHG | BODY MASS INDEX: 24.25 KG/M2 | TEMPERATURE: 97.8 F | OXYGEN SATURATION: 98 %

## 2025-05-28 DIAGNOSIS — I10 ESSENTIAL (PRIMARY) HYPERTENSION: ICD-10-CM

## 2025-05-28 DIAGNOSIS — E78.5 HYPERLIPIDEMIA, UNSPECIFIED: ICD-10-CM

## 2025-05-28 DIAGNOSIS — I07.1 RHEUMATIC TRICUSPID INSUFFICIENCY: ICD-10-CM

## 2025-05-28 DIAGNOSIS — R00.1 BRADYCARDIA, UNSPECIFIED: ICD-10-CM

## 2025-05-28 DIAGNOSIS — I44.0 ATRIOVENTRICULAR BLOCK, FIRST DEGREE: ICD-10-CM

## 2025-05-28 DIAGNOSIS — I35.1 NONRHEUMATIC AORTIC (VALVE) INSUFFICIENCY: ICD-10-CM

## 2025-05-28 DIAGNOSIS — I34.0 NONRHEUMATIC MITRAL (VALVE) INSUFFICIENCY: ICD-10-CM

## 2025-05-28 DIAGNOSIS — R01.1 CARDIAC MURMUR, UNSPECIFIED: ICD-10-CM

## 2025-05-28 PROCEDURE — 93000 ELECTROCARDIOGRAM COMPLETE: CPT

## 2025-05-28 PROCEDURE — 99214 OFFICE O/P EST MOD 30 MIN: CPT

## 2025-05-28 PROCEDURE — G2211 COMPLEX E/M VISIT ADD ON: CPT

## 2025-06-11 ENCOUNTER — APPOINTMENT (OUTPATIENT)
Dept: OTOLARYNGOLOGY | Facility: CLINIC | Age: 89
End: 2025-06-11
Payer: MEDICARE

## 2025-06-11 PROCEDURE — 69210 REMOVE IMPACTED EAR WAX UNI: CPT | Mod: LT,RT

## 2025-06-11 PROCEDURE — 99213 OFFICE O/P EST LOW 20 MIN: CPT | Mod: 25

## 2025-06-25 ENCOUNTER — NON-APPOINTMENT (OUTPATIENT)
Age: 89
End: 2025-06-25

## 2025-07-18 ENCOUNTER — LABORATORY RESULT (OUTPATIENT)
Age: 89
End: 2025-07-18

## 2025-07-18 RX ORDER — SULFAMETHOXAZOLE AND TRIMETHOPRIM 400; 80 MG/1; MG/1
400-80 TABLET ORAL TWICE DAILY
Qty: 20 | Refills: 1 | Status: ACTIVE | COMMUNITY
Start: 2025-07-18 | End: 1900-01-01

## 2025-07-19 LAB
APPEARANCE: CLEAR
BILIRUBIN URINE: NEGATIVE
BLOOD URINE: NEGATIVE
COLOR: YELLOW
GLUCOSE QUALITATIVE U: NEGATIVE MG/DL
KETONES URINE: NEGATIVE MG/DL
LEUKOCYTE ESTERASE URINE: NEGATIVE
NITRITE URINE: NEGATIVE
PH URINE: 5.5
PROTEIN URINE: 30 MG/DL
SPECIFIC GRAVITY URINE: 1.02
UROBILINOGEN URINE: 0.2 MG/DL

## 2025-07-21 LAB — BACTERIA UR CULT: NORMAL

## 2025-07-28 ENCOUNTER — APPOINTMENT (OUTPATIENT)
Dept: CARDIOLOGY | Facility: CLINIC | Age: 89
End: 2025-07-28
Payer: MEDICARE

## 2025-07-28 VITALS
RESPIRATION RATE: 16 BRPM | BODY MASS INDEX: 24.4 KG/M2 | DIASTOLIC BLOOD PRESSURE: 68 MMHG | TEMPERATURE: 97.9 F | SYSTOLIC BLOOD PRESSURE: 124 MMHG | OXYGEN SATURATION: 96 % | HEART RATE: 58 BPM | HEIGHT: 68 IN | WEIGHT: 161 LBS

## 2025-07-28 DIAGNOSIS — E78.5 HYPERLIPIDEMIA, UNSPECIFIED: ICD-10-CM

## 2025-07-28 DIAGNOSIS — R00.1 BRADYCARDIA, UNSPECIFIED: ICD-10-CM

## 2025-07-28 DIAGNOSIS — I07.1 RHEUMATIC TRICUSPID INSUFFICIENCY: ICD-10-CM

## 2025-07-28 DIAGNOSIS — I35.1 NONRHEUMATIC AORTIC (VALVE) INSUFFICIENCY: ICD-10-CM

## 2025-07-28 DIAGNOSIS — I34.0 NONRHEUMATIC MITRAL (VALVE) INSUFFICIENCY: ICD-10-CM

## 2025-07-28 DIAGNOSIS — I44.0 ATRIOVENTRICULAR BLOCK, FIRST DEGREE: ICD-10-CM

## 2025-07-28 DIAGNOSIS — R42 DIZZINESS AND GIDDINESS: ICD-10-CM

## 2025-07-28 DIAGNOSIS — I10 ESSENTIAL (PRIMARY) HYPERTENSION: ICD-10-CM

## 2025-07-28 PROCEDURE — 99214 OFFICE O/P EST MOD 30 MIN: CPT

## 2025-07-28 PROCEDURE — G2211 COMPLEX E/M VISIT ADD ON: CPT

## 2025-08-14 ENCOUNTER — NON-APPOINTMENT (OUTPATIENT)
Age: 89
End: 2025-08-14

## 2025-08-14 ENCOUNTER — APPOINTMENT (OUTPATIENT)
Dept: UROLOGY | Facility: CLINIC | Age: 89
End: 2025-08-14
Payer: MEDICARE

## 2025-08-14 VITALS
HEART RATE: 54 BPM | DIASTOLIC BLOOD PRESSURE: 85 MMHG | OXYGEN SATURATION: 99 % | WEIGHT: 160 LBS | BODY MASS INDEX: 24.25 KG/M2 | RESPIRATION RATE: 16 BRPM | TEMPERATURE: 97.8 F | SYSTOLIC BLOOD PRESSURE: 170 MMHG | HEIGHT: 68 IN

## 2025-08-14 DIAGNOSIS — R32 UNSPECIFIED URINARY INCONTINENCE: ICD-10-CM

## 2025-08-14 DIAGNOSIS — N13.8 BENIGN PROSTATIC HYPERPLASIA WITH LOWER URINARY TRACT SYMPMS: ICD-10-CM

## 2025-08-14 DIAGNOSIS — N40.1 BENIGN PROSTATIC HYPERPLASIA WITH LOWER URINARY TRACT SYMPMS: ICD-10-CM

## 2025-08-14 DIAGNOSIS — R21 RASH AND OTHER NONSPECIFIC SKIN ERUPTION: ICD-10-CM

## 2025-08-14 DIAGNOSIS — R97.20 ELEVATED PROSTATE, SPECIFIC ANTIGEN [PSA]: ICD-10-CM

## 2025-08-14 PROCEDURE — 99213 OFFICE O/P EST LOW 20 MIN: CPT

## 2025-08-14 PROCEDURE — 51798 US URINE CAPACITY MEASURE: CPT

## 2025-08-14 RX ORDER — FINASTERIDE 5 MG/1
5 TABLET, FILM COATED ORAL
Qty: 90 | Refills: 3 | Status: ACTIVE | COMMUNITY
Start: 2025-08-14 | End: 1900-01-01

## 2025-08-14 RX ORDER — ALFUZOSIN HYDROCHLORIDE 10 MG/1
10 TABLET, EXTENDED RELEASE ORAL
Qty: 90 | Refills: 3 | Status: ACTIVE | COMMUNITY
Start: 2025-08-14 | End: 1900-01-01

## 2025-08-14 RX ORDER — NYSTATIN 100000 [USP'U]/G
100000 POWDER TOPICAL 3 TIMES DAILY
Qty: 1 | Refills: 2 | Status: ACTIVE | COMMUNITY
Start: 2025-08-14 | End: 1900-01-01

## 2025-08-15 LAB
APPEARANCE: CLEAR
BACTERIA: NEGATIVE /HPF
BILIRUBIN URINE: NEGATIVE
BLOOD URINE: NEGATIVE
CAST: 0 /LPF
COLOR: ABNORMAL
EPITHELIAL CELLS: 0 /HPF
GLUCOSE QUALITATIVE U: NEGATIVE MG/DL
KETONES URINE: NEGATIVE MG/DL
LEUKOCYTE ESTERASE URINE: NEGATIVE
MICROSCOPIC-UA: NORMAL
NITRITE URINE: NEGATIVE
PH URINE: 7.5
PROTEIN URINE: NORMAL MG/DL
RED BLOOD CELLS URINE: 0 /HPF
SPECIFIC GRAVITY URINE: 1.01
UROBILINOGEN URINE: 0.2 MG/DL
WHITE BLOOD CELLS URINE: 0 /HPF

## 2025-08-20 LAB — BACTERIA UR CULT: NORMAL

## 2025-09-16 ENCOUNTER — APPOINTMENT (OUTPATIENT)
Dept: CARDIOLOGY | Facility: CLINIC | Age: 89
End: 2025-09-16
Payer: MEDICARE

## 2025-09-16 VITALS
TEMPERATURE: 98.3 F | HEIGHT: 68 IN | SYSTOLIC BLOOD PRESSURE: 145 MMHG | BODY MASS INDEX: 25.01 KG/M2 | DIASTOLIC BLOOD PRESSURE: 80 MMHG | RESPIRATION RATE: 16 BRPM | WEIGHT: 165 LBS | HEART RATE: 56 BPM | OXYGEN SATURATION: 99 %

## 2025-09-16 DIAGNOSIS — Z86.718 PERSONAL HISTORY OF OTHER VENOUS THROMBOSIS AND EMBOLISM: ICD-10-CM

## 2025-09-16 DIAGNOSIS — I34.0 NONRHEUMATIC MITRAL (VALVE) INSUFFICIENCY: ICD-10-CM

## 2025-09-16 DIAGNOSIS — I07.1 RHEUMATIC TRICUSPID INSUFFICIENCY: ICD-10-CM

## 2025-09-16 DIAGNOSIS — R00.1 BRADYCARDIA, UNSPECIFIED: ICD-10-CM

## 2025-09-16 DIAGNOSIS — E78.5 HYPERLIPIDEMIA, UNSPECIFIED: ICD-10-CM

## 2025-09-16 DIAGNOSIS — I35.1 NONRHEUMATIC AORTIC (VALVE) INSUFFICIENCY: ICD-10-CM

## 2025-09-16 PROCEDURE — 99214 OFFICE O/P EST MOD 30 MIN: CPT

## 2025-09-16 PROCEDURE — G2211 COMPLEX E/M VISIT ADD ON: CPT

## 2025-09-18 ENCOUNTER — APPOINTMENT (OUTPATIENT)
Dept: NEPHROLOGY | Facility: CLINIC | Age: 89
End: 2025-09-18
Payer: MEDICARE

## 2025-09-18 VITALS
DIASTOLIC BLOOD PRESSURE: 70 MMHG | HEIGHT: 68 IN | HEART RATE: 50 BPM | WEIGHT: 165 LBS | BODY MASS INDEX: 25.01 KG/M2 | SYSTOLIC BLOOD PRESSURE: 164 MMHG | OXYGEN SATURATION: 98 % | RESPIRATION RATE: 16 BRPM

## 2025-09-18 DIAGNOSIS — R32 UNSPECIFIED URINARY INCONTINENCE: ICD-10-CM

## 2025-09-18 DIAGNOSIS — I10 ESSENTIAL (PRIMARY) HYPERTENSION: ICD-10-CM

## 2025-09-18 PROCEDURE — 99213 OFFICE O/P EST LOW 20 MIN: CPT

## 2025-09-18 RX ORDER — AMLODIPINE BESYLATE 2.5 MG/1
2.5 TABLET ORAL DAILY
Qty: 30 | Refills: 2 | Status: ACTIVE | COMMUNITY
Start: 2025-09-18 | End: 1900-01-01